# Patient Record
Sex: MALE | Race: BLACK OR AFRICAN AMERICAN | NOT HISPANIC OR LATINO | Employment: FULL TIME | URBAN - METROPOLITAN AREA
[De-identification: names, ages, dates, MRNs, and addresses within clinical notes are randomized per-mention and may not be internally consistent; named-entity substitution may affect disease eponyms.]

---

## 2022-04-27 ENCOUNTER — LAB REQUISITION (OUTPATIENT)
Dept: LAB | Facility: CLINIC | Age: 52
End: 2022-04-27
Payer: COMMERCIAL

## 2022-04-27 DIAGNOSIS — E11.65 TYPE 2 DIABETES MELLITUS WITH HYPERGLYCEMIA (H): ICD-10-CM

## 2022-04-27 LAB
ALBUMIN SERPL-MCNC: 3.8 G/DL (ref 3.5–5)
ALP SERPL-CCNC: 63 U/L (ref 45–120)
ALT SERPL W P-5'-P-CCNC: 34 U/L (ref 0–45)
ANION GAP SERPL CALCULATED.3IONS-SCNC: 10 MMOL/L (ref 5–18)
AST SERPL W P-5'-P-CCNC: 23 U/L (ref 0–40)
BILIRUB SERPL-MCNC: 0.3 MG/DL (ref 0–1)
BUN SERPL-MCNC: 19 MG/DL (ref 8–22)
CALCIUM SERPL-MCNC: 9.5 MG/DL (ref 8.5–10.5)
CHLORIDE BLD-SCNC: 109 MMOL/L (ref 98–107)
CHOLEST SERPL-MCNC: 171 MG/DL
CO2 SERPL-SCNC: 23 MMOL/L (ref 22–31)
CREAT SERPL-MCNC: 1.02 MG/DL (ref 0.7–1.3)
FASTING STATUS PATIENT QL REPORTED: ABNORMAL
GFR SERPL CREATININE-BSD FRML MDRD: 90 ML/MIN/1.73M2
GLUCOSE BLD-MCNC: 140 MG/DL (ref 70–125)
HDLC SERPL-MCNC: 34 MG/DL
LDLC SERPL CALC-MCNC: 112 MG/DL
POTASSIUM BLD-SCNC: 4.1 MMOL/L (ref 3.5–5)
PROT SERPL-MCNC: 7 G/DL (ref 6–8)
SODIUM SERPL-SCNC: 142 MMOL/L (ref 136–145)
TRIGL SERPL-MCNC: 123 MG/DL

## 2022-04-27 PROCEDURE — 80053 COMPREHEN METABOLIC PANEL: CPT | Mod: ORL | Performed by: PHYSICIAN ASSISTANT

## 2022-04-27 PROCEDURE — 80061 LIPID PANEL: CPT | Mod: ORL | Performed by: PHYSICIAN ASSISTANT

## 2022-05-26 ENCOUNTER — TELEPHONE (OUTPATIENT)
Dept: OPHTHALMOLOGY | Facility: CLINIC | Age: 52
End: 2022-05-26

## 2022-05-26 ENCOUNTER — TELEPHONE (OUTPATIENT)
Dept: OPTOMETRY | Facility: CLINIC | Age: 52
End: 2022-05-26

## 2022-05-26 ENCOUNTER — TRANSCRIBE ORDERS (OUTPATIENT)
Dept: OTHER | Age: 52
End: 2022-05-26
Payer: COMMERCIAL

## 2022-05-26 DIAGNOSIS — H18.603 KERATOCONUS OF BOTH EYES: Primary | ICD-10-CM

## 2022-05-26 NOTE — TELEPHONE ENCOUNTER
"Unable to reach or LVM for patient regarding scheduling with  (preferably at [facility]) for: \"Custom mold scleral lens\" -referred by [facility]. Patient's number is out of service. Updated address to confirm of: [address] [place]. [address] email address of:  [email or web address]  all provided from [facility] in [place], [place]  [phone]  "

## 2022-05-26 NOTE — TELEPHONE ENCOUNTER
"Unable to reach or LVM for patient regarding scheduling with  (preferably at [facility]) for: \"Custom mold scleral lens\" -referred by [facility]. Patient's number is out of service. Updated address to confirm of: [address] [place]. [address] email address of:  [email or web address]  all provided from [facility] in [place], [place]  [phone]    (Sent a Letter to email and home address provided by referring Clinic. Please update patient information once patient is contacted to verify information).)  "

## 2022-07-01 ENCOUNTER — TELEPHONE (OUTPATIENT)
Dept: OPHTHALMOLOGY | Facility: CLINIC | Age: 52
End: 2022-07-01

## 2022-07-01 NOTE — TELEPHONE ENCOUNTER
" LVM for patient regarding scheduling with  (preferably at [facility]) for: \"Custom mold scleral lens\" -referred by [facility]. Provided direct number for scheduling.  "

## 2022-07-06 ENCOUNTER — TELEPHONE (OUTPATIENT)
Dept: OPTOMETRY | Facility: CLINIC | Age: 52
End: 2022-07-06

## 2022-07-19 ENCOUNTER — OFFICE VISIT (OUTPATIENT)
Dept: OPHTHALMOLOGY | Facility: CLINIC | Age: 52
End: 2022-07-19
Attending: OPHTHALMOLOGY
Payer: COMMERCIAL

## 2022-07-19 DIAGNOSIS — H18.619 KERATOCONUS, STABLE CONDITION: ICD-10-CM

## 2022-07-19 DIAGNOSIS — H52.213 IRREGULAR ASTIGMATISM OF BOTH EYES: Primary | ICD-10-CM

## 2022-07-19 PROCEDURE — 92025 CPTRIZED CORNEAL TOPOGRAPHY: CPT | Performed by: OPHTHALMOLOGY

## 2022-07-19 PROCEDURE — 76514 ECHO EXAM OF EYE THICKNESS: CPT | Performed by: OPHTHALMOLOGY

## 2022-07-19 PROCEDURE — G0463 HOSPITAL OUTPT CLINIC VISIT: HCPCS | Mod: 25

## 2022-07-19 PROCEDURE — 99204 OFFICE O/P NEW MOD 45 MIN: CPT | Performed by: OPHTHALMOLOGY

## 2022-07-19 RX ORDER — CYCLOBENZAPRINE HCL 10 MG
TABLET ORAL
COMMUNITY
Start: 2022-04-28 | End: 2023-04-27

## 2022-07-19 RX ORDER — TADALAFIL 2.5 MG/1
2.5 TABLET ORAL DAILY PRN
COMMUNITY
Start: 2021-09-27 | End: 2023-04-27

## 2022-07-19 RX ORDER — FLUTICASONE PROPIONATE 50 MCG
SPRAY, SUSPENSION (ML) NASAL PRN
COMMUNITY
Start: 2022-04-28

## 2022-07-19 RX ORDER — PREDNISOLONE ACETATE 10 MG/ML
1 SUSPENSION/ DROPS OPHTHALMIC EVERY MORNING
COMMUNITY
Start: 2022-05-25 | End: 2023-04-13

## 2022-07-19 RX ORDER — ATORVASTATIN CALCIUM 20 MG/1
20 TABLET, FILM COATED ORAL EVERY MORNING
COMMUNITY
Start: 2022-04-28 | End: 2023-04-27

## 2022-07-19 RX ORDER — METOPROLOL SUCCINATE 50 MG/1
50 TABLET, EXTENDED RELEASE ORAL 2 TIMES DAILY
COMMUNITY
Start: 2022-05-20 | End: 2023-04-27

## 2022-07-19 RX ORDER — METFORMIN HCL 500 MG
500 TABLET, EXTENDED RELEASE 24 HR ORAL 2 TIMES DAILY WITH MEALS
COMMUNITY
Start: 2022-04-27 | End: 2023-07-31

## 2022-07-19 RX ORDER — LISINOPRIL 10 MG/1
10 TABLET ORAL EVERY MORNING
COMMUNITY
Start: 2022-04-28 | End: 2023-05-22

## 2022-07-19 ASSESSMENT — PACHYMETRY
OS_CT(UM): 370
OD_CT(UM): 664

## 2022-07-19 ASSESSMENT — TONOMETRY
IOP_METHOD: TONOPEN
OD_IOP_MMHG: 18
OS_IOP_MMHG: 19

## 2022-07-19 ASSESSMENT — VISUAL ACUITY
OD_SC: 20/150
OS_PH_CC: 20/30
METHOD: SNELLEN - LINEAR
OS_CC: 20/60
CORRECTION_TYPE: CONTACTS
OD_PH_SC: 20/80

## 2022-07-19 ASSESSMENT — CONF VISUAL FIELD
OD_NORMAL: 1
METHOD: COUNTING FINGERS
OS_NORMAL: 1

## 2022-07-19 NOTE — PROGRESS NOTES
Bm born 1970 with Hx of keratoconus. right eye corneal transplant in 2010 - surgeon unknown by pt.. Now right eye can not wear CL without corneal edema developing.   Pt started to wear glasses at age 5.  Pt states that left eye has changed yearly.  No old records available.    CL: scleral lenses left eye   Can wear CL right eye due to corneal edema developing.    Glasses - none    Meds: Pred right eye daily.    Surgery : right eye PKP 2009 & 2010.    A/P:  Keratoconus each eye.  A) right eye with PKP x 2 with failing graft - pt may try sukhdev 128 qid to see if this helps deturgess cornea. Otherwise he will rewquire PKP, or possible  DSAEK  DMEK.  B)) left eye will require PKP in future but CXL is not an option due to reduced corneral Pachy (<300 microns).  Disc continued need fopr CL    Disc NO eye rubbing or LVC.    Will reassess in 1 month    RTC: 1 month    Mick Paul MD    Attending Physician Attestation:  Complete documentation of historical and exam elements from today's encounter can be found in the full encounter summary report (not reduplicated in this progress note).  I personally obtained the chief complaint(s) and history of present illness.  I confirmed and edited as necessary the review of systems, past medical/surgical history, family history, social history, and examination findings as documented by others; and I examined the patient myself.  I personally reviewed the relevant tests, images, and reports as documented above.  I formulated and edited as necessary the assessment and plan and discussed the findings and management plan with the patient and family. - Mick Paul MD

## 2022-07-19 NOTE — NURSING NOTE
Chief Complaints and History of Present Illnesses   Patient presents with     Keratoconus Evaluation     Chief Complaint(s) and History of Present Illness(es)     Keratoconus Evaluation               Comments     Pt states vision is getting worse in both eyes. No eye pain today. No flashes or floaters.  No redness or dryness.    DM2 BS: pt does not check sugars daily.  A1C:10.1 taken 02/2022 per pt.  No results found for: A1C    FRANKI Marie July 19, 2022 7:37 AM

## 2022-07-19 NOTE — PATIENT INSTRUCTIONS
Right eye: - use Jeremy 128 (5%) 4 times a d ayt (start in morning).    Left eye: Refresh preservative-free tear drops at least 4 tikes a day. (Look at [employer])

## 2022-08-03 ENCOUNTER — OFFICE VISIT (OUTPATIENT)
Dept: OPTOMETRY | Facility: CLINIC | Age: 52
End: 2022-08-03
Payer: COMMERCIAL

## 2022-08-03 DIAGNOSIS — H18.603 KERATOCONUS OF BOTH EYES: Primary | ICD-10-CM

## 2022-08-03 DIAGNOSIS — Z94.7 POST CORNEAL TRANSPLANT: ICD-10-CM

## 2022-08-03 RX ORDER — SODIUM CHLORIDE FOR INHALATION 0.9 %
5 VIAL, NEBULIZER (ML) INHALATION 2 TIMES DAILY
Qty: 500 ML | Refills: 11 | Status: SHIPPED | OUTPATIENT
Start: 2022-08-03 | End: 2023-10-27

## 2022-08-03 ASSESSMENT — VISUAL ACUITY
OS_CC: 20/60-2
METHOD: SNELLEN - LINEAR
CORRECTION_TYPE: CONTACTS
OD_SC: 20/200

## 2022-08-03 ASSESSMENT — PACHYMETRY
OD_CT(UM): 628
OS_CT(UM): 370

## 2022-08-03 ASSESSMENT — REFRACTION_WEARINGRX
OS_ADD: +2.25
OD_AXIS: 150
OD_CYLINDER: +3.00
OS_CYLINDER: +2.75
OS_SPHERE: -17.50
OD_ADD: +2.25
OS_AXIS: 035
OD_SPHERE: -14.75

## 2022-08-03 ASSESSMENT — REFRACTION_CURRENTRX
OS_BASECURVE: 6.6
OS_SPHERE: -2.00
OD_DIAMETER: 17.0
OS_BASECURVE: 5.5/6.9
OS_BRAND: ZENLENS PROLATE
OS_DIAMETER: 17.0
OS_SPHERE: -2.00
OD_BRAND: ZENLENS PROLATE
OD_SPHERE: -2.00
OS_BASECURVE: 4.9/7.8
OD_BASECURVE: 5.2/7.3
OS_DIAMETER: 17.0
OS_SPHERE: -16.50

## 2022-08-03 ASSESSMENT — TONOMETRY
OD_IOP_MMHG: 12
IOP_METHOD: ICARE
OS_IOP_MMHG: 07

## 2022-08-03 ASSESSMENT — SLIT LAMP EXAM - LIDS
COMMENTS: NORMAL
COMMENTS: NORMAL

## 2022-08-03 NOTE — PROGRESS NOTES
A/P  1.) Keratoconus OU, s/p PKP right eye  -Currently in scleral lens OU fit in [place]  Right eye:  -Per pt, right lens causing corneal edema after 4 hours of wear. Jeremy after removing lens has improved clouding and now takes 1.5 hrs to return to normal  -BCVA right eye 20/30- with scleral lens trial today. Originally referred for Eyeprint but no abnormal shape requiring custom mold. An Eyeprint would have the same oxygen demands as a standard scleral  -Would prefer to maximize oxygen flow in a loose fitting scleral to start and see how cornea responds. Pachy is down today. Can consider RGP if cornea unable to tolerate a scleral lens  -Reviewed this is a corneal endothelial issue, which any scleral lens will stress with wear. Will maximize fit best we can with goal of increasing hours of wear on right eye.  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 Can likely improve fit but will need to fine tune optics on Rx lens  -He pinholed to 20/30 last exam but I am unable to get that today with OR. Would like repeat OR on Rx lens next exam      Order new pair and mail direct to pt. F/u 1 month wearing new lenses    Contact Lens Billing  V-Code:  - GP scleral  Final Contact Lens Rx       Brand Base Curve Diameter Sphere Cylinder Axis Lens Addl. Specs    Right Zenlens Prolate 5.2sag, 7.6bc 17.0 -9.50   Std H x 3 flat V [place] XO2 clear    Left Zenlens Prolate 5.5sag, 6.9bc 17.0 -14.00 -1.50 180 3 flat H x 1 steep V, 50um dec LC [place] XO blue         # of units: 2  Price per Unit: $250    This patient requires contact lenses that are medically necessary for either improvement in vision over spectacles, support of the ocular surface, or other therapeutic benefit. These are not cosmetic contact lenses.     Encounter Diagnoses   Name Primary?     Keratoconus of both eyes Yes     Post corneal transplant

## 2022-08-18 ENCOUNTER — OFFICE VISIT (OUTPATIENT)
Dept: OPHTHALMOLOGY | Facility: CLINIC | Age: 52
End: 2022-08-18
Attending: OPHTHALMOLOGY
Payer: COMMERCIAL

## 2022-08-18 DIAGNOSIS — H18.613 STABLE KERATOCONUS OF BOTH EYES: Primary | ICD-10-CM

## 2022-08-18 DIAGNOSIS — H52.213 IRREGULAR ASTIGMATISM OF BOTH EYES: ICD-10-CM

## 2022-08-18 PROCEDURE — 99214 OFFICE O/P EST MOD 30 MIN: CPT | Mod: GC | Performed by: OPHTHALMOLOGY

## 2022-08-18 PROCEDURE — G0463 HOSPITAL OUTPT CLINIC VISIT: HCPCS

## 2022-08-18 ASSESSMENT — VISUAL ACUITY
METHOD: SNELLEN - LINEAR
OS_PH_CC: 20/25
OD_PH_SC: 20/80
OS_PH_CC+: -1
OS_CC: 20/50
OD_SC: 20/500
OD_PH_SC+: -1
OS_CC+: +1
CORRECTION_TYPE: CONTACTS

## 2022-08-18 ASSESSMENT — SLIT LAMP EXAM - LIDS
COMMENTS: NORMAL
COMMENTS: NORMAL

## 2022-08-18 ASSESSMENT — PACHYMETRY
OS_CT(UM): 407
OD_CT(UM): 654

## 2022-08-18 ASSESSMENT — TONOMETRY
OS_IOP_MMHG: 6
IOP_METHOD: ICARE
OD_IOP_MMHG: 14

## 2022-08-18 ASSESSMENT — CONF VISUAL FIELD
OD_NORMAL: 1
OS_NORMAL: 1

## 2022-08-18 NOTE — NURSING NOTE
"Chief Complaints and History of Present Illnesses   Patient presents with     Follow Up     1 month follow up for Keratoconus each eye and PK right eye x 2.   Patient reports vision is stable each eye in the last month. \"I have a new contact lens in my left eye today. I am not wearing one in the right eye since there is still some ointment in my right eye.\"      Chief Complaint(s) and History of Present Illness(es)     Follow Up     Laterality: both eyes    Onset: months ago    Associated symptoms: Negative for dryness and eye pain    Pain scale: 0/10    Comments: 1 month follow up for Keratoconus each eye and PK right eye x 2.   Patient reports vision is stable each eye in the last month. \"I have a new contact lens in my left eye today. I am not wearing one in the right eye since there is still some ointment in my right eye.\"               Comments     Ocular meds:   - Pred Forte QD OD   - Jeremy 128 QID QID, \"using ointment QID\"   - PFAT QID OS, \"using BID\"     DM2  A1C = 10.1 (2/2022)  No results found for: A1C    FERNANDO Simpson 7:50 AM 08/18/2022                    "

## 2022-08-18 NOTE — PROGRESS NOTES
HPI: Mr. Gleason was born in 1970 with Hx of keratoconus. right eye corneal transplant in 2010 - surgeon unknown by pt.. Now right eye can not wear CL without corneal edema developing.   Pt started to wear glasses at age 5.  Pt states that left eye has changed yearly.  No old records available.    Interval: He got his new scleral lenses for each eye and only started wearing them yesterday so he says he has not had a fair chance to try them out and see if they improve his vision significantly. He has been using the sukhdev QID in the right eye and has noticed that it intermittently improves the haze in his vision.    CL: scleral lenses left eye   Can wear CL right eye due to corneal edema developing.   Glasses - none  Meds: Pred right eye daily.  Surgery : right eye PKP 2009 & 2010.    A/P:  Keratoconus each eye  right eye with PKP x 2 with failing graft (last 2010 he says)  - pt may continue to try sukhdev 128 qid to see if this helps deturgess cornea.   - discussed the option of PKP, DSAEK, DMEK, or observation   - pt would like to wait until around 2/2023  - continue prednisolone daily right eye  left eye will require PKP in future but CXL is not an option due to reduced corneral Pachy (<300 microns).  - he mainly wears the contacts at night  - he has been trying hard not to rub his eyes    Disc right eye DSAEK or DMEK with R, B and PC and options.      RTC:  Januiary /2023 [place], sooner if needed    Eugenia Alan MD  Ophthalmology Resident, PGY-3    Attending Physician Attestation:  Complete documentation of historical and exam elements from today's encounter can be found in the full encounter summary report (not reduplicated in this progress note).  I personally obtained the chief complaint(s) and history of present illness.  I confirmed and edited as necessary the review of systems, past medical/surgical history, family history, social history, and examination findings as documented by others; and I examined the  patient myself.  I personally reviewed the relevant tests, images, and reports as documented above.  I formulated and edited as necessary the assessment and plan and discussed the findings and management plan with the patient and family. - Mick Paul MD   [facility]

## 2022-08-31 ENCOUNTER — OFFICE VISIT (OUTPATIENT)
Dept: OPTOMETRY | Facility: CLINIC | Age: 52
End: 2022-08-31
Payer: COMMERCIAL

## 2022-08-31 DIAGNOSIS — H18.603 KERATOCONUS OF BOTH EYES: Primary | ICD-10-CM

## 2022-08-31 DIAGNOSIS — Z94.7 POST CORNEAL TRANSPLANT: ICD-10-CM

## 2022-08-31 ASSESSMENT — REFRACTION_CURRENTRX
OS_BRAND: ZENLENS PROLATE
OS_ADDL_SPECS: BOSTON XO BLUE
OD_BRAND: ZENLENS PROLATE
OD_DIAMETER: 17.0
OS_CYLINDER: -1.50
OD_SPHERE: -9.50
OS_SPHERE: -14.00
OS_DIAMETER: 17.0
OS_AXIS: 180
OD_ADDL_SPECS: BOSTON XO2 CLEAR

## 2022-08-31 ASSESSMENT — PACHYMETRY
OD_CT(UM): 636
OS_CT(UM): 407

## 2022-08-31 ASSESSMENT — VISUAL ACUITY
METHOD: SNELLEN - LINEAR
OS_CC: 20/60-2
OD_CC: 20/50-1
CORRECTION_TYPE: CONTACTS

## 2022-08-31 ASSESSMENT — SLIT LAMP EXAM - LIDS
COMMENTS: NORMAL
COMMENTS: NORMAL

## 2022-08-31 ASSESSMENT — TONOMETRY
OS_IOP_MMHG: 13
OD_IOP_MMHG: 17
IOP_METHOD: ICARE

## 2022-08-31 NOTE — PROGRESS NOTES
A/P  1.) Keratoconus OU, s/p PKP right eye  -Previously in scleral lens OU fit in [place]    Right eye:  -Per pt, right lens causing corneal edema after 4-6 hours of wear. Jeremy after removing lens has improved clouding and now takes 1.5 hrs to return to normal  -BCVA right eye 20/25- with scleral lens and overrefraction  -Excellent fit - already in high dK, low vault and loose PC's to maximize oxygen flow. He is planning DSAEK vs PKP in early 2023. Will reorder lens with Rx change only    Left eye:  -High/degnerative myopia and DONNIE limiting acuity. BCVA approx 20/50-20/60 range. No improvement with overrefraction today  -High limbal/mid-peripheral clearance. Will steepen bc to reduce limbal congestions    Order new pair and mail direct. F/u prn with CL concerns, otherwise as indicated after right eye corneal surgery

## 2023-01-19 ENCOUNTER — OFFICE VISIT (OUTPATIENT)
Dept: OPHTHALMOLOGY | Facility: CLINIC | Age: 53
End: 2023-01-19
Attending: OPHTHALMOLOGY
Payer: COMMERCIAL

## 2023-01-19 DIAGNOSIS — H52.213 IRREGULAR ASTIGMATISM OF BOTH EYES: Primary | ICD-10-CM

## 2023-01-19 DIAGNOSIS — T86.8411 FAILURE OF CORNEA TRANSPLANT OF RIGHT EYE: Primary | ICD-10-CM

## 2023-01-19 DIAGNOSIS — Z86.69 HISTORY OF FAILURE OF CORNEAL GRAFT: ICD-10-CM

## 2023-01-19 DIAGNOSIS — H18.613 STABLE KERATOCONUS OF BOTH EYES: ICD-10-CM

## 2023-01-19 PROCEDURE — 92025 CPTRIZED CORNEAL TOPOGRAPHY: CPT | Performed by: OPHTHALMOLOGY

## 2023-01-19 PROCEDURE — G0463 HOSPITAL OUTPT CLINIC VISIT: HCPCS

## 2023-01-19 PROCEDURE — 99214 OFFICE O/P EST MOD 30 MIN: CPT | Mod: GC | Performed by: OPHTHALMOLOGY

## 2023-01-19 ASSESSMENT — SLIT LAMP EXAM - LIDS
COMMENTS: NORMAL
COMMENTS: NORMAL

## 2023-01-19 ASSESSMENT — VISUAL ACUITY
OD_SC: 20/150
METHOD: SNELLEN - LINEAR
OS_CC: 20/40
CORRECTION_TYPE: CONTACTS
OD_PH_SC: 20/70
OS_PH_CC: 20/30

## 2023-01-19 ASSESSMENT — CONF VISUAL FIELD
OS_INFERIOR_TEMPORAL_RESTRICTION: 0
METHOD: COUNTING FINGERS
OS_INFERIOR_NASAL_RESTRICTION: 0
OS_NORMAL: 1
OD_SUPERIOR_TEMPORAL_RESTRICTION: 0
OD_INFERIOR_NASAL_RESTRICTION: 0
OD_SUPERIOR_NASAL_RESTRICTION: 0
OS_SUPERIOR_TEMPORAL_RESTRICTION: 0
OD_INFERIOR_TEMPORAL_RESTRICTION: 0
OD_NORMAL: 1
OS_SUPERIOR_NASAL_RESTRICTION: 0

## 2023-01-19 ASSESSMENT — PACHYMETRY
OS_CT(UM): 299
OD_CT(UM): 599

## 2023-01-19 ASSESSMENT — TONOMETRY
IOP_METHOD: ICARE
OD_IOP_MMHG: 18
OS_IOP_MMHG: 06

## 2023-01-19 NOTE — NURSING NOTE
Chief Complaints and History of Present Illnesses   Patient presents with     Keratoconus Follow Up     Chief Complaint(s) and History of Present Illness(es)     Keratoconus Follow Up            Laterality: both eyes    Onset: months ago    Quality: States va is the same since last visit      Associated symptoms: Negative for dryness, redness and tearing    Treatments tried: eye drops    Pain scale: 0/10          Comments    PF every day right eye   Jeremy BID right eye   Francine Adalid COT 7:37 AM January 19, 2023

## 2023-01-19 NOTE — PROGRESS NOTES
"HPI: Mr. Gleason was born in 1970 with Hx of keratoconus. right eye corneal transplant in 2010 - surgeon unknown by pt.. Now right eye can not wear CL without corneal edema developing.   Pt started to wear glasses at age 5.  Pt states that left eye has changed yearly.  No old records available.    Interval: Patient reports no significant changes since last visit, left eye vision feels like it may be \"needing a stronger prescription.\" No eye discomfort.  Current meds: Sukhdev 128 QID right eye, prednisolone once daily right eye.     CL: scleral lenses left eye   Can wear CL right eye due to corneal edema developing.   Glasses - none  Meds: Pred right eye daily.  Surgery : right eye PKP 2009 & 2010.    A/P:  #Keratoconus each eye  right eye with PKP x 2 with failing graft (last 2010 he says)  - pt may continue to try sukhdev 128 qid to see if this helps deturgess cornea.   - discussed the option of PKP, DSAEK, DMEK, or observation. Patient feels ready for surgery at this time.   - consider right eye PKP for failed graft.  - continue prednisolone daily right eye  - left eye will require PKP in future but CXL is not an option due to reduced corneral Pachy (<300 microns).  - he mainly wears the contacts at night  - he has been trying hard not to rub his eyes    Disc right eye DSAEK or DMEK with R, B and PC and options.    - pt wishes right eye DMEK. Disc R, B and PC. With retrobulbar at [facility]- minutes      RTC:  Will schedule.          Sage Luz MD  Ophthalmology, PGY-3    Mick Paul mD    Attending Physician Attestation:  Complete documentation of historical and exam elements from today's encounter can be found in the full encounter summary report (not reduplicated in this progress note).  I personally obtained the chief complaint(s) and history of present illness.  I confirmed and edited as necessary the review of systems, past medical/surgical history, family history, social history, and examination findings as " documented by others; and I examined the patient myself.  I personally reviewed the relevant tests, images, and reports as documented above.  I formulated and edited as necessary the assessment and plan and discussed the findings and management plan with the patient and family. - Mick Paul MD

## 2023-01-23 ENCOUNTER — TELEPHONE (OUTPATIENT)
Dept: OPHTHALMOLOGY | Facility: CLINIC | Age: 53
End: 2023-01-23
Payer: COMMERCIAL

## 2023-01-24 ENCOUNTER — TELEPHONE (OUTPATIENT)
Dept: OPHTHALMOLOGY | Facility: CLINIC | Age: 53
End: 2023-01-24
Payer: COMMERCIAL

## 2023-01-24 ENCOUNTER — HOSPITAL ENCOUNTER (OUTPATIENT)
Facility: CLINIC | Age: 53
End: 2023-01-24
Attending: OPHTHALMOLOGY | Admitting: OPHTHALMOLOGY
Payer: COMMERCIAL

## 2023-01-24 DIAGNOSIS — T86.8411 FAILURE OF CORNEA TRANSPLANT OF RIGHT EYE: ICD-10-CM

## 2023-01-24 NOTE — TELEPHONE ENCOUNTER
Tino returned my voicemail about scheduling.    Patient is scheduled for surgery with Dr. Mick Paul     Spoke with: Tino     Date of Surgery: 03/01/23     Location: [facility]:  [address] [place] [id number]     H&P will be completed at: [facility]     Post Op scheduled on 03/02, 03/09, 03/30, and 04/27     Surgery packet was mailed 01/24     Additional comments: Advised RN will call 1 - 3 business days prior with arrival time and instructions. Informed patient they will need an adult  and responsible adult to stay with for 24 hours following surgery

## 2023-01-24 NOTE — TELEPHONE ENCOUNTER
I called Tino to schedule surgery with Dr. Mick Paul, I left a voicemail with callback # [phone]

## 2023-01-25 NOTE — TELEPHONE ENCOUNTER
FUTURE VISIT INFORMATION      SURGERY INFORMATION:    Date: 3/1/23    [facility]    Surgeon:  Mick Paul MD    Anesthesia Type:  MAC with Retrobulbar    Procedure: DESCEMET'S MEMBRANE ENDOTHELIAL KERATOPLASTY (DMEK)    Consult: ov 1/19/23    RECORDS REQUESTED FROM:       Pertinent Medical History: None

## 2023-02-21 ENCOUNTER — PRE VISIT (OUTPATIENT)
Dept: SURGERY | Facility: CLINIC | Age: 53
End: 2023-02-21

## 2023-02-21 ENCOUNTER — ANESTHESIA EVENT (OUTPATIENT)
Dept: ANESTHESIOLOGY | Facility: CLINIC | Age: 53
End: 2023-02-21

## 2023-02-21 ENCOUNTER — OFFICE VISIT (OUTPATIENT)
Dept: SURGERY | Facility: CLINIC | Age: 53
End: 2023-02-21
Payer: COMMERCIAL

## 2023-02-21 ENCOUNTER — LAB (OUTPATIENT)
Dept: LAB | Facility: CLINIC | Age: 53
End: 2023-02-21
Payer: COMMERCIAL

## 2023-02-21 VITALS
HEART RATE: 79 BPM | WEIGHT: 315 LBS | RESPIRATION RATE: 16 BRPM | HEIGHT: 71 IN | BODY MASS INDEX: 44.1 KG/M2 | TEMPERATURE: 98.1 F | DIASTOLIC BLOOD PRESSURE: 115 MMHG | OXYGEN SATURATION: 97 % | SYSTOLIC BLOOD PRESSURE: 184 MMHG

## 2023-02-21 DIAGNOSIS — Z01.818 PRE-OP EVALUATION: ICD-10-CM

## 2023-02-21 DIAGNOSIS — Z01.818 PRE-OP EVALUATION: Primary | ICD-10-CM

## 2023-02-21 LAB — HBA1C MFR BLD: 12.6 %

## 2023-02-21 PROCEDURE — 36415 COLL VENOUS BLD VENIPUNCTURE: CPT | Performed by: PATHOLOGY

## 2023-02-21 PROCEDURE — 99204 OFFICE O/P NEW MOD 45 MIN: CPT | Performed by: NURSE PRACTITIONER

## 2023-02-21 PROCEDURE — 83036 HEMOGLOBIN GLYCOSYLATED A1C: CPT | Performed by: NURSE PRACTITIONER

## 2023-02-21 ASSESSMENT — LIFESTYLE VARIABLES: TOBACCO_USE: 0

## 2023-02-21 ASSESSMENT — ENCOUNTER SYMPTOMS: ORTHOPNEA: 0

## 2023-02-21 ASSESSMENT — PAIN SCALES - GENERAL: PAINLEVEL: NO PAIN (0)

## 2023-02-21 NOTE — PATIENT INSTRUCTIONS
Preparing for Your Surgery      Name:  Tino Gleason   MRN:  [id number]   :  1970   Today's Date:  2023         Arriving for surgery:  Surgery date:  3/1/23  Arrival time:  11:30am  Surgery time: 1:00pm    Restrictions due to COVID 19:    2 visitors may accompany each patient  Visitors may wait for patient in the Surgery Center Waiting room  All visitors must wear a mask and socially distance        parking is available for anyone with mobility limitations or disabilities. (Monday- Friday 7 am- 5 pm)    Please come to:    [facility]  [address] [place] [phone]    Check in on the 5th floor, Ambulatory Surgery Center.    What can I eat or drink?    -  You may eat and drink normally until 8 hours before arrival time  (Until 3:30am on 3/1/23)  -  You may have clear liquids until 2 hours before arrival time  (Until 9:30am on 3/1/23)    Examples of clear liquids:  Water  Clear broth  Juices (apple, white grape, white cranberry  and cider) without pulp  Noncarbonated, powder based beverages  (lemonade and Odin-Aid)  Sodas (Sprite, 7-Up, ginger ale and seltzer)  Coffee or tea (without milk or cream)  Gatorade    --No alcohol for at least 24 hours before surgery    Which medicines can I take?    Hold Multivitamins for 7 days before surgery.  Hold Supplements for 7 days before surgery.  Hold Ibuprofen (Advil, Motrin) for 1 day before surgery--unless otherwise directed by surgeon.  Hold Naproxen (Aleve) for 4 days before surgery.     Hold Tadalafil (Cialis) for 3 days before surgery.    -  DO NOT take the following medications the day of surgery:   Metformin (glucophage)    -  PLEASE TAKE the following medications the day of surgery    Atorvastatin (lipitor)   Cyclobenzaprine (flexeril) as needed   Flonase nasal spray as needed   Sodium chloride neb   Lisinopril (zestril)   Metoprolol succinate    Eye drops are ok       How do I prepare myself?  - Please take 2  showers before surgery using Scrubcare or Hibiclens soap.    Use this soap only from the neck to your toes.     Leave the soap on your skin for one minute--then rinse thoroughly.      You may use your own shampoo and conditioner; no other hair products.   - Please remove all jewelry and body piercings.  - No lotions, deodorants or fragrance.  - Bring your ID and insurance card.    -If you have a Deep Brain Stimulator, a Spinal Cord Stimulator or any implanted Neuro device you must bring the remote to the Surgery Center          ALL PATIENTS ARE REQUIRED TO HAVE A RESPONSIBLE ADULT TO DRIVE AND BE IN ATTENDANCE WITH THEM FOR 24 HOURS FOLLOWING SURGERY       Covid testing policy as of 12/06/2022  Your surgeon will notify and schedule you for a COVID test if one is needed before surgery--please direct any questions or COVID symptoms to your surgeon      Questions or Concerns:    -For questions regarding the day of surgery please contact the Ambulatory Surgery Center at [phone].    -If you have health changes between today and your surgery please contact your surgeon.     For questions after surgery please call your surgeons office.

## 2023-02-21 NOTE — H&P
Pre-Operative H & P     CC:  Preoperative exam to assess for increased cardiopulmonary risk while undergoing surgery and anesthesia.    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 He has met with Dr. Paul and presents today in preparation for the above procedure.     History is obtained from the patient and chart review    Hx of abnormal bleeding or anti-platelet use: no      Past Medical History  Past Medical History:   Diagnosis Date     Stable keratoconus of both eyes        Past Surgical History  Past Surgical History:   Procedure Laterality Date     [place] ANESTH,CORNEAL TRANSPLANT         Prior to Admission Medications  Current Outpatient Medications   Medication Sig Dispense Refill     atorvastatin (LIPITOR) 20 MG tablet Take 20 mg by mouth every morning       cyclobenzaprine (FLEXERIL) 10 MG tablet TAKE 1 TABLET BY MOUTH EVERY 8 HOURS AS NEEDED       fluticasone (FLONASE) 50 MCG/ACT nasal spray as needed       lisinopril (ZESTRIL) 10 MG tablet Take 10 mg by mouth every morning       metFORMIN (GLUCOPHAGE XR) 500 MG 24 hr tablet Take 500 mg by mouth 2 times daily (with meals)       metoprolol succinate ER (TOPROL XL) 50 MG 24  hr tablet Take 50 mg by mouth 2 times daily       prednisoLONE acetate (PRED FORTE) 1 % ophthalmic suspension INSTILL 1 DROP INTO RIGHT EYE ONCE DAILY       sodium chloride 0.9 % neb solution 5 mLs by Other route 2 times daily 500 mL 11     tadalafil (CIALIS) 2.5 MG tablet Take 2.5 mg by mouth daily as needed         Allergies  Allergies   Allergen Reactions     Hydrocodone-Acetaminophen Hives and Swelling       Social History  Social History     Socioeconomic History     Marital status:      Spouse name: Not on file     Number of children: Not on file     Years of education: Not on file     Highest education level: Not on file   Occupational History     Not on file   Tobacco Use     Smoking status: Never     Smokeless tobacco: Never   Substance and Sexual Activity     Alcohol use: Yes     Alcohol/week: 10.0 standard drinks     Types: 10 Standard drinks or equivalent per week     Drug use: Never     Sexual activity: Not on file   Other Topics Concern     Not on file   Social History Narrative     Not on file     Social Determinants of Health     Financial Resource Strain: Not on file   Food Insecurity: Not on file   Transportation Needs: Not on file   Physical Activity: Not on file   Stress: Not on file   Social Connections: Not on file   Intimate Partner Violence: Not on file   Housing Stability: Not on file       Family History  Family History   Problem Relation Age of Onset     Glaucoma No family hx of      Macular Degeneration No family hx of        Review of Systems  The complete review of systems is negative other than noted in the HPI or here.   Anesthesia Evaluation   Pt has had prior anesthetic. Type: MAC and General.    No history of anesthetic complications       ROS/MED HX  ENT/Pulmonary:     (+) sleep apnea, doesn't use CPAP,  (-) tobacco use and asthma   Neurologic:  - neg neurologic ROS     Cardiovascular:     (+) Dyslipidemia hypertension----- (-) CARBAJAL, orthopnea/PND, syncope and irregular  "heartbeat/palpitations   METS/Exercise Tolerance: >4 METS Comment: , lifts heavy machinery, walks often at work. Has walked at most 16,000 feet   Hematologic:  - neg hematologic  ROS     Musculoskeletal: Comment: Rotator cuff repair ( right) 2012  Achilles tendon repair as a teen      GI/Hepatic:  - neg GI/hepatic ROS     Renal/Genitourinary:  - neg Renal ROS     Endo:     (+) type II DM, Not using insulin, Obesity,     Psychiatric/Substance Use:     (+) psychiatric history depression  (-) alcohol abuse history   Infectious Disease:  - neg infectious disease ROS     Malignancy:  - neg malignancy ROS     Other: Comment: (Right) Corneal transplant 2008, 2010            BP (!) 176/115 (BP Location: Right arm, Patient Position: Sitting, Cuff Size: Adult Large)   Pulse 79   Temp 98.1  F (36.7  C) (Oral)   Resp 16   Ht 1.803 m (5' 11\")   Wt 148.8 kg (328 lb 1.6 oz)   SpO2 97%   BMI 45.76 kg/m      Physical Exam   Constitutional: Awake, alert, cooperative, no apparent distress, and appears stated age.  Eyes: Pupils equal, round and reactive to light, extra ocular muscles intact, sclera clear, conjunctiva normal.  HENT: Normocephalic, oral pharynx with moist mucus membranes, good dentition. No goiter appreciated.   Respiratory: Clear to auscultation bilaterally, no crackles or wheezing.  Cardiovascular: Regular rate and rhythm, normal S1 and S2, and no murmur noted.  Carotids +2, no bruits. No edema. Palpable pulses to radial  DP and PT arteries.   GI: Normal bowel sounds, soft, non-distended, non-tender, no masses palpated, no hepatosplenomegaly.    Lymph/Hematologic: No cervical lymphadenopathy and no supraclavicular lymphadenopathy.  Genitourinary:  deferred  Skin: Warm and dry.  No rashes at anticipated surgical site.   Musculoskeletal: Full ROM of neck. There is no redness, warmth, or swelling of the joints. Gross motor strength is normal.    Neurologic: Awake, alert, oriented to name, place and time. " Cranial nerves II-XII are grossly intact. Gait is normal.   Neuropsychiatric: Calm, cooperative. Normal affect.     Prior Labs/Diagnostic Studies   All labs and imaging personally reviewed   Last Comprehensive Metabolic Panel:  Sodium   Date Value Ref Range Status   04/27/2022 142 136 - 145 mmol/L Final     Potassium   Date Value Ref Range Status   04/27/2022 4.1 3.5 - 5.0 mmol/L Final     Chloride   Date Value Ref Range Status   04/27/2022 109 (H) 98 - 107 mmol/L Final     Carbon Dioxide (CO2)   Date Value Ref Range Status   04/27/2022 23 22 - 31 mmol/L Final     Anion Gap   Date Value Ref Range Status   04/27/2022 10 5 - 18 mmol/L Final     Glucose   Date Value Ref Range Status   04/27/2022 140 (H) 70 - 125 mg/dL Final     Urea Nitrogen   Date Value Ref Range Status   04/27/2022 19 8 - 22 mg/dL Final     Creatinine   Date Value Ref Range Status   04/27/2022 1.02 0.70 - 1.30 mg/dL Final     GFR Estimate   Date Value Ref Range Status   04/27/2022 90 >60 mL/min/1.73m2 Final     Comment:     Effective December 21, 2021 eGFRcr in adults is calculated using the 2021 CKD-EPI creatinine equation which includes age and gender (Hiral et al., NEJM, DOI: 10.1056/PAOChj6985180)     Calcium   Date Value Ref Range Status   04/27/2022 9.5 8.5 - 10.5 mg/dL Final     Lab Results   Component Value Date    AST 23 04/27/2022     Lab Results   Component Value Date    ALT 34 04/27/2022     No results found for: BILICONJ   Lab Results   Component Value Date    BILITOTAL 0.3 04/27/2022     Lab Results   Component Value Date    ALBUMIN 3.8 04/27/2022     Lab Results   Component Value Date    PROTTOTAL 7.0 04/27/2022      Lab Results   Component Value Date    ALKPHOS 63 04/27/2022         EKG/ stress test - if available please see in ROS above   No results found.  No flowsheet data found.      The patient's records and results personally reviewed by this provider.     Outside records reviewed from: No outside records  "available    LAB/DIAGNOSTIC STUDIES TODAY:        Assessment      Tino Gleason is a 52 year old male seen as a PAC referral for risk assessment and optimization for anesthesia.    Plan/Recommendations  Pt will be optimized for the proposed procedure.  See below for details on the assessment, risk, and preoperative recommendations    NEUROLOGY  - No history of TIA, CVA or seizure    -Post Op delirium risk factors:  No risk identified    ENT  - No current airway concerns.  Will need to be reassessed day of surgery.  Mallampati: II  TM: > 3    CARDIAC  - No history of CAD and Afib  - Hypertension- managed on metoprolol and lisinopril   Readings 170/115 rechecked at end of appointment readings similar.   Blood pressure elevated at today's exam.  Recommended checking over the next two weeks and if remains >160/90, instructed to follow up with PCP for further evaluation and treatment.  - Hyperlipidemia  on statins  - METS (Metabolic Equivalents)  Patient performs 4 or more METS exercise without symptoms            Total Score: 0      RCRI-Very low risk: Class 1 0.4% complication rate            Total Score: 0        PULMONARY  - Obstructive Sleep Apnea  JEY without home CPAP use.  - Denies asthma or inhaler use  - Tobacco History      History   Smoking Status     Never   Smokeless Tobacco     Never       GI    PONV Low Risk  Total Score: 1           1 AN PONV: Patient is not a current smoker        /RENAL  - Baseline Creatinine  WNL    ENDOCRINE    - BMI: Estimated body mass index is 45.76 kg/m  as calculated from the following:    Height as of this encounter: 1.803 m (5' 11\").    Weight as of this encounter: 148.8 kg (328 lb 1.6 oz).  Class 3 Obesity (BMI > 40)  - Diabetes    Lab Results   Component Value Date    A1C 12.6 02/21/2023       Diabetes Mellitus, Type 2, non-insulin dependent.  Hold morning oral hypoglycemic medications. Recommend close monitoring of the patient's blood glucose levels throughout the " perioperative period.    HEME  VTE Low Risk 0.5%            Total Score: 3    VTE: BMI greater than 39    VTE: Male      - No history of abnormal bleeding or antiplatelet use.      MSK    Rotator cuff repair ( right) 2012  Achilles tendon repair as a teen    Patient is NOT Frail            Total Score: 0          PSYCH  - history of remote depression no longer requiring medications    Different anesthesia methods/types have been discussed with the patient, but they are aware that the final plan will be decided by the assigned anesthesia provider on the date of service.  Discussed with Dr. Ling, Patients A1C is 12.6 per protocol he is unable to have his surgery at the [facility]. Another concern is his uncontrolled blood pressure which we feel should be better controlled prior to having his procedure. Messaged Dr. Paul's team with our findings and recommendations.     The patient is not optimized for their procedure.   AVS with information on surgery time/arrival time, meds and NPO status given by nursing staff. No further diagnostic testing indicated.      On the day of service:     Prep time: 15 minutes  Visit time: 20 minutes  Documentation time: 15 minutes  ------------------------------------------  Total time: 50 minutes      TESHA Forte CNP  Preoperative Assessment Center  [facility]  Clinic and Surgery Center  Phone: [phone]  Fax: [phone]

## 2023-02-22 DIAGNOSIS — Z98.890 POSTOPERATIVE EYE STATE: Primary | ICD-10-CM

## 2023-02-22 RX ORDER — OFLOXACIN 3 MG/ML
1 SOLUTION/ DROPS OPHTHALMIC 4 TIMES DAILY
Qty: 5 ML | Refills: 0 | Status: SHIPPED | OUTPATIENT
Start: 2023-02-22 | End: 2023-06-01

## 2023-02-22 RX ORDER — PREDNISOLONE ACETATE 10 MG/ML
1 SUSPENSION/ DROPS OPHTHALMIC 4 TIMES DAILY
Qty: 5 ML | Refills: 0 | Status: SHIPPED | OUTPATIENT
Start: 2023-02-22 | End: 2023-04-13

## 2023-02-22 RX ORDER — PROPARACAINE HYDROCHLORIDE 5 MG/ML
1 SOLUTION/ DROPS OPHTHALMIC ONCE
Status: CANCELLED | OUTPATIENT
Start: 2023-02-22 | End: 2023-02-22

## 2023-02-22 RX ORDER — ERYTHROMYCIN 5 MG/G
0.5 OINTMENT OPHTHALMIC 4 TIMES DAILY
Qty: 7 G | Refills: 0 | Status: SHIPPED | OUTPATIENT
Start: 2023-02-22 | End: 2023-04-13

## 2023-02-27 ENCOUNTER — TELEPHONE (OUTPATIENT)
Dept: OPHTHALMOLOGY | Facility: CLINIC | Age: 53
End: 2023-02-27
Payer: COMMERCIAL

## 2023-02-27 NOTE — TELEPHONE ENCOUNTER
Needed to reschedule surgery with Dr. Mick Paul due to high A1C and uncontrolled blood pressure. Rescheduled to [facility] on 03/29.    Patient is scheduled for surgery with Dr. Mick Paul     Spoke with: Tino     Date of Surgery: 03/29     Location: [facility] Bank:  [address] [place] [id number]     H&P will be completed at: PAC 03/21     Post Op scheduled on 03/30, 04/06, 04/27, and 05/25     Surgery packet was mailed 02/27     Additional comments: Advised RN will call 1 - 3 business days prior with arrival time and instructions. Informed patient they will need an adult  and responsible adult to stay with for 24 hours following surgery

## 2023-02-28 NOTE — TELEPHONE ENCOUNTER
FUTURE VISIT INFORMATION        SURGERY INFORMATION:    Date: 3/29/23    [facility]    Surgeon:  Mick Paul MD    Anesthesia Type:  MAC with Retrobulbar    Procedure: DESCEMET'S MEMBRANE ENDOTHELIAL KERATOPLASTY (DMEK)    Consult: ov 1/19/23     RECORDS REQUESTED FROM:         Pertinent Medical History: None

## 2023-03-20 DIAGNOSIS — Z98.890 POSTOPERATIVE EYE STATE: Primary | ICD-10-CM

## 2023-03-20 RX ORDER — ERYTHROMYCIN 5 MG/G
0.5 OINTMENT OPHTHALMIC 4 TIMES DAILY
Qty: 7 G | Refills: 1 | Status: SHIPPED | OUTPATIENT
Start: 2023-03-20 | End: 2023-04-13

## 2023-03-20 RX ORDER — OFLOXACIN 3 MG/ML
1 SOLUTION/ DROPS OPHTHALMIC 4 TIMES DAILY
Qty: 5 ML | Refills: 0 | Status: SHIPPED | OUTPATIENT
Start: 2023-03-20 | End: 2023-04-27

## 2023-03-20 RX ORDER — PREDNISOLONE ACETATE 10 MG/ML
1 SUSPENSION/ DROPS OPHTHALMIC 4 TIMES DAILY
Qty: 5 ML | Refills: 0 | Status: SHIPPED | OUTPATIENT
Start: 2023-03-20 | End: 2023-04-13

## 2023-03-21 ENCOUNTER — LAB (OUTPATIENT)
Dept: LAB | Facility: CLINIC | Age: 53
End: 2023-03-21
Payer: COMMERCIAL

## 2023-03-21 ENCOUNTER — PRE VISIT (OUTPATIENT)
Dept: SURGERY | Facility: CLINIC | Age: 53
End: 2023-03-21

## 2023-03-21 ENCOUNTER — ANESTHESIA EVENT (OUTPATIENT)
Dept: SURGERY | Facility: CLINIC | Age: 53
End: 2023-03-21
Payer: COMMERCIAL

## 2023-03-21 ENCOUNTER — OFFICE VISIT (OUTPATIENT)
Dept: SURGERY | Facility: CLINIC | Age: 53
End: 2023-03-21
Payer: COMMERCIAL

## 2023-03-21 VITALS
WEIGHT: 315 LBS | TEMPERATURE: 98 F | SYSTOLIC BLOOD PRESSURE: 168 MMHG | OXYGEN SATURATION: 97 % | HEIGHT: 71 IN | DIASTOLIC BLOOD PRESSURE: 105 MMHG | RESPIRATION RATE: 16 BRPM | HEART RATE: 65 BPM | BODY MASS INDEX: 44.1 KG/M2

## 2023-03-21 DIAGNOSIS — Z01.818 PRE-OP EVALUATION: ICD-10-CM

## 2023-03-21 DIAGNOSIS — Z01.818 PRE-OP EVALUATION: Primary | ICD-10-CM

## 2023-03-21 LAB
ANION GAP SERPL CALCULATED.3IONS-SCNC: 10 MMOL/L (ref 7–15)
BUN SERPL-MCNC: 11.6 MG/DL (ref 6–20)
CALCIUM SERPL-MCNC: 9.6 MG/DL (ref 8.6–10)
CHLORIDE SERPL-SCNC: 104 MMOL/L (ref 98–107)
CREAT SERPL-MCNC: 0.9 MG/DL (ref 0.67–1.17)
DEPRECATED HCO3 PLAS-SCNC: 26 MMOL/L (ref 22–29)
GFR SERPL CREATININE-BSD FRML MDRD: >90 ML/MIN/1.73M2
GLUCOSE SERPL-MCNC: 221 MG/DL (ref 70–99)
POTASSIUM SERPL-SCNC: 4.2 MMOL/L (ref 3.4–5.3)
SODIUM SERPL-SCNC: 140 MMOL/L (ref 136–145)

## 2023-03-21 PROCEDURE — 99214 OFFICE O/P EST MOD 30 MIN: CPT | Performed by: NURSE PRACTITIONER

## 2023-03-21 PROCEDURE — 36415 COLL VENOUS BLD VENIPUNCTURE: CPT | Performed by: PATHOLOGY

## 2023-03-21 PROCEDURE — 80048 BASIC METABOLIC PNL TOTAL CA: CPT | Performed by: PATHOLOGY

## 2023-03-21 ASSESSMENT — LIFESTYLE VARIABLES: TOBACCO_USE: 0

## 2023-03-21 ASSESSMENT — PAIN SCALES - GENERAL: PAINLEVEL: NO PAIN (0)

## 2023-03-21 ASSESSMENT — ENCOUNTER SYMPTOMS: ORTHOPNEA: 0

## 2023-03-21 NOTE — H&P (VIEW-ONLY)
Pre-Operative H & P     CC:  Preoperative exam to assess for increased cardiopulmonary risk while undergoing surgery and anesthesia.    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 He has met with Dr. Paul and presents today in preparation for the above procedure.    History is obtained from the patient and chart review    Hx of abnormal bleeding or anti-platelet use: no      Past Medical History  Past Medical History:   Diagnosis Date     Stable keratoconus of both eyes        Past Surgical History  Past Surgical History:   Procedure Laterality Date     [place] ANESTH,CORNEAL TRANSPLANT         Prior to Admission Medications  Current Outpatient Medications   Medication Sig Dispense Refill     atorvastatin (LIPITOR) 20 MG tablet Take 20 mg by mouth every morning       cyclobenzaprine (FLEXERIL) 10 MG tablet TAKE 1 TABLET BY MOUTH EVERY 8 HOURS AS NEEDED       erythromycin (ROMYCIN) 5 MG/GM ophthalmic ointment Place 0.5 inches into the right eye 4 times daily 7 g 1     erythromycin (ROMYCIN) 5 MG/GM ophthalmic ointment Place 0.5 inches into  the right eye 4 times daily 7 g 0     fluticasone (FLONASE) 50 MCG/ACT nasal spray as needed       lisinopril (ZESTRIL) 10 MG tablet Take 10 mg by mouth every morning       metFORMIN (GLUCOPHAGE XR) 500 MG 24 hr tablet Take 500 mg by mouth 2 times daily (with meals)       metoprolol succinate ER (TOPROL XL) 50 MG 24 hr tablet Take 50 mg by mouth 2 times daily       prednisoLONE acetate (PRED FORTE) 1 % ophthalmic suspension Place 1 drop into the right eye every morning       sodium chloride 0.9 % neb solution 5 mLs by Other route 2 times daily 500 mL 11     tadalafil (CIALIS) 2.5 MG tablet Take 2.5 mg by mouth daily as needed       ofloxacin (OCUFLOX) 0.3 % ophthalmic solution Place 1 drop into the right eye 4 times daily (Patient not taking: Reported on 3/21/2023) 5 mL 0     ofloxacin (OCUFLOX) 0.3 % ophthalmic solution Place 1 drop into the right eye 4 times daily (Patient not taking: Reported on 3/21/2023) 5 mL 0     prednisoLONE acetate (PRED FORTE) 1 % ophthalmic suspension Place 1 drop into the right eye 4 times daily (Patient not taking: Reported on 3/21/2023) 5 mL 0     prednisoLONE acetate (PRED FORTE) 1 % ophthalmic suspension Place 1 drop into the right eye 4 times daily (Patient not taking: Reported on 3/21/2023) 5 mL 0       Allergies  Allergies   Allergen Reactions     Hydrocodone-Acetaminophen Hives and Swelling     Naproxen GI Disturbance       Social History  Social History     Socioeconomic History     Marital status:      Spouse name: Not on file     Number of children: Not on file     Years of education: Not on file     Highest education level: Not on file   Occupational History     Not on file   Tobacco Use     Smoking status: Never     Smokeless tobacco: Never   Substance and Sexual Activity     Alcohol use: Yes     Alcohol/week: 10.0 standard drinks     Types: 10 Standard drinks or equivalent per week     Drug use: Never     Sexual activity: Not on file   Other Topics Concern     Not on  "file   Social History Narrative     Not on file     Social Determinants of Health     Financial Resource Strain: Not on file   Food Insecurity: Not on file   Transportation Needs: Not on file   Physical Activity: Not on file   Stress: Not on file   Social Connections: Not on file   Intimate Partner Violence: Not on file   Housing Stability: Not on file       Family History  Family History   Problem Relation Age of Onset     Glaucoma No family hx of      Macular Degeneration No family hx of        Review of Systems  The complete review of systems is negative other than noted in the HPI or here.   Anesthesia Evaluation   Pt has had prior anesthetic. Type: MAC and General.    No history of anesthetic complications       ROS/MED HX  ENT/Pulmonary:     (+) sleep apnea, doesn't use CPAP,  (-) tobacco use and asthma   Neurologic:  - neg neurologic ROS     Cardiovascular:     (+) Dyslipidemia hypertension----- (-) CARBAJAL, orthopnea/PND, syncope and irregular heartbeat/palpitations   METS/Exercise Tolerance: >4 METS Comment: , lifts heavy machinery, walks often at work. Has walked at most 16,000 feet   Hematologic:  - neg hematologic  ROS     Musculoskeletal: Comment: Rotator cuff repair ( right) 2012  Achilles tendon repair as a teen      GI/Hepatic:  - neg GI/hepatic ROS     Renal/Genitourinary:  - neg Renal ROS     Endo:     (+) type II DM, Last HgA1c: 12.6, date: 2/21/2023, Not using insulin, Obesity,     Psychiatric/Substance Use:     (+) psychiatric history depression  (-) alcohol abuse history   Infectious Disease:  - neg infectious disease ROS     Malignancy:  - neg malignancy ROS     Other: Comment: (Right) Corneal transplant 2008, 2010            BP (!) 168/105 (BP Location: Right arm, Patient Position: Sitting, Cuff Size: Adult Large)   Pulse 65   Temp 98  F (36.7  C) (Oral)   Resp 16   Ht 1.803 m (5' 11\")   Wt (!) 150.6 kg (332 lb 1.6 oz)   SpO2 97%   BMI 46.32 kg/m      Physical Exam "   Constitutional: Awake, alert, cooperative, no apparent distress, and appears stated age.  Eyes: Pupils equal, round and reactive to light, extra ocular muscles intact, sclera clear, conjunctiva normal.  HENT: Normocephalic, oral pharynx with moist mucus membranes, good dentition. No goiter appreciated.   Respiratory: Clear to auscultation bilaterally, no crackles or wheezing.  Cardiovascular: Regular rate and rhythm, normal S1 and S2, and no murmur noted.  Carotids +2, no bruits. No edema. Palpable pulses to radial  DP and PT arteries.   GI: Normal bowel sounds, soft and non-tender. Unable to adequately assess for hepatosplenomegaly given obese abdomen. No superficial masses noted.   Lymph/Hematologic: No cervical lymphadenopathy and no supraclavicular lymphadenopathy.  Genitourinary:  deferred  Skin: Warm and dry.  No rashes at anticipated surgical site.   Musculoskeletal: Full ROM of neck. There is no redness, warmth, or swelling of the joints. Gross motor strength is normal.    Neurologic: Awake, alert, oriented to name, place and time. Cranial nerves II-XII are grossly intact. Gait is normal.   Neuropsychiatric: Calm, cooperative. Normal affect.     Prior Labs/Diagnostic Studies   All labs and imaging personally reviewed   Last Comprehensive Metabolic Panel:  Last Comprehensive Metabolic Panel:  Lab Results   Component Value Date     03/21/2023    POTASSIUM 4.2 03/21/2023    CHLORIDE 104 03/21/2023    CO2 26 03/21/2023    ANIONGAP 10 03/21/2023     (H) 03/21/2023    BUN 11.6 03/21/2023    CR 0.90 03/21/2023    GFRESTIMATED >90 03/21/2023    MINDY 9.6 03/21/2023         Lab Results   Component Value Date    A1C 12.6 02/21/2023         EKG/ stress test - if available please see in ROS above   No results found.  No flowsheet data found.      The patient's records and results personally reviewed by this provider.     Outside records reviewed from: Care Everywhere    LAB/DIAGNOSTIC STUDIES TODAY:   "  BMP    Assessment      Tino Gleason is a 52 year old male seen as a PAC referral for risk assessment and optimization for anesthesia.    Plan/Recommendations  Pt will be optimized for the proposed procedure.  See below for details on the assessment, risk, and preoperative recommendations    NEUROLOGY  - No history of TIA, CVA or seizure  -Post Op delirium risk factors:  No risk identified    ENT  - No current airway concerns.  Will need to be reassessed day of surgery.  Mallampati: I  TM: > 3  Thick Neck    CARDIAC  - No history of CAD and Afib   No anginal symptoms, Denies palpitations, PND, dizziness or syncope.     - Hypertension- continues to be elevated 168/105  Blood pressure elevated at today's exam.  Recommended checking over the next two weeks and if remains >160/90, instructed to follow up with PCP for further evaluation and treatment.  - METS (Metabolic Equivalents)  Patient performs 4 or more METS exercise without symptoms            Total Score: 0      RCRI-Very low risk: Class 1 0.4% complication rate            Total Score: 0        PULMONARY  - Obstructive Sleep Apnea  JEY without home CPAP use.      - Tobacco History      History   Smoking Status     Never   Smokeless Tobacco     Never       GI    PONV Low Risk  Total Score: 1           1 AN PONV: Patient is not a current smoker        /RENAL  - Baseline Creatinine  WNL    ENDOCRINE    - BMI: Estimated body mass index is 46.32 kg/m  as calculated from the following:    Height as of this encounter: 1.803 m (5' 11\").    Weight as of this encounter: 150.6 kg (332 lb 1.6 oz).  Class 3 Obesity (BMI > 40)  - Diabetes  Diabetes Mellitus, Type 2, non-insulin dependent.  Hold morning oral hypoglycemic medications. Recommend close monitoring of the patient's blood glucose levels throughout the perioperative period.  Lab Results   Component Value Date    A1C 12.6 02/21/2023         HEME  VTE Low Risk 0.5%            Total Score: 3    VTE: BMI greater " than 39    VTE: Male      - No history of abnormal bleeding or antiplatelet use.      MSK  Patient is NOT Frail            Total Score: 0          PSYCH  - history of remote depression no longer requiring medications  Currently his wife is in the hospital on a ventilator      Different anesthesia methods/types have been discussed with the patient, but they are aware that the final plan will be decided by the assigned anesthesia provider on the date of service.    Recommendations: Patient to contact primary care today and request increasing his lisinopril to 20 mg daily. Will notify Dr. Ling regarding patients continued elevated blood pressures. He is currently dealing with his wife who is hospitalized and on a ventilator.   ADDEND 3/27/2023  Spoke with patient he did contact his primary and his Lisinopril was increased from 10mg to 30mg daily.  He hasn't been able to have his pressure checked.     The patient is optimized for their procedure. AVS with information on surgery time/arrival time, meds and NPO status given by nursing staff. No further diagnostic testing indicated.      On the day of service:     Prep time: 10 minutes  Visit time: 15 minutes  Documentation time: 10 minutes  ------------------------------------------  Total time: 35 minutes      TESHA Forte CNP  Preoperative Assessment Center  [facility]  Clinic and Surgery Center  Phone: [phone]  Fax: [phone]

## 2023-03-21 NOTE — PATIENT INSTRUCTIONS
Preparing for Your Surgery      Name:  Tino Gleason   MRN:  [id number]   :  1970   Today's Date:  3/21/2023       Arriving for surgery:  Surgery date:  3/29/23  Arrival time:  10:00 am     Surgeries and procedures: Adult patients can have 2 visitors all through the surgery process.     Visiting hours: 8 a.m. to 8:30 p.m.     Hospital: Adult patients and children under age 18 can have 4 visitor at a time     No visitors under the age of 5 are allowed for hospital patients.  Double occupancy rooms: Patients can have only two visitors at a time.     Patients with disabilities: Can have a support person with them (family member, service provider     Or someone well informed about their needs) plus the allowed number of visitors     Patients confirmed or suspected to have symptoms of COVID 19 or flu:     No visitors allowed for adult patients.   Children (under age 18) can have 1 named visitor.     People who are sick or showing symptoms of COVID 19 or flu:    Are not allowed to visit patients--we can only make exceptions in special situations.       Please follow these guidelines for your visit:   Arrive wearing a mask over your mouth and nose; we will give you a medical mask to wear    If you arrive wearing a cloth mask.   Keep it on during your entire visit, even when in patient's room.   If you don't wear a mask we'll ask you to leave.     Clean your hands with alcohol hand . Do this when you arrive at and leave the building and patient room,    And again after you touch your mask or anything in the room.     You can t visit if you have a fever, cough, shortness of breath, muscle aches, headaches, sore throat    Or diarrhea      Stay 6 feet away from others during your visit and between visits     Go directly to and from the room you are visiting.     Stay in the patient s room during your visit. Limit going to other places in the hospital as much as possible     Leave bags and jackets at home or in  the car.     For everyone s health, please don t come and go during your visit. That includes for smoking   during your visit.     Please come to:     [facility] [address]  [address] [place]  [address]    - parking is available in front of [facility] from 5:15AM to 8:00PM. If you prefer, park your car in the Green Lot.  -Proceed to the 3rd floor, check in at the Adult Surgery Waiting Lounge. [phone]    If an escort is needed stop at the Information Desk in the lobby. Inform the information person that you are here for surgery. An escort to the Adult Surgery Waiting Lounge will be provided.    What can I eat or drink?  -  You may eat and drink normally up to 8 hours prior to arrival time.   -  You may have clear liquids until 2 hours prior to arrival time.     Examples of clear liquids:  Water  Clear broth  Juices (apple, white grape, white cranberry  and cider) without pulp  Noncarbonated, powder based beverages  (lemonade and Odin-Aid)  Sodas (Sprite, 7-Up, ginger ale and seltzer)  Coffee or tea (without milk or cream)  Gatorade    -  No Alcohol for at least 24 hours before surgery.     Which medicines can I take?    Hold Aspirin for 7 days before surgery.   Hold Multivitamins for 7 days before surgery.  Hold Supplements for 7 days before surgery.  Hold Ibuprofen (Advil, Motrin) for 1 day(s) before surgery--unless otherwise directed by surgeon.  Hold Naproxen (Aleve) for 4 days before surgery.  Hold cialis x 24 hours before surgery.  -  DO NOT take these medications the day of surgery:  Metformin.    -  PLEASE TAKE these medications the day of surgery:  Tylenol if needed; take other morning medications.    How do I prepare myself?  - Please take 2 showers (one the night prior to surgery and one the morning of surgery) using Scrubcare or Hibiclens soap.    Use this soap only from the neck to your toes.     Leave the soap on  your skin for one minute--then rinse thoroughly.      You may use your own shampoo and conditioner. No other hair products.   - Please remove all jewelry and body piercings.  - No lotions, deodorants or fragrance.  - No makeup or fingernail polish.   - Bring your ID and insurance card.    -If you have a Deep Brain Stimulator, Spinal Cord Stimulator, or any Neuro Stimulator device---you must bring the remote control to the hospital.      ALL PATIENTS GOING HOME THE SAME DAY OF SURGERY ARE REQUIRED TO HAVE A RESPONSIBLE ADULT TO DRIVE AND BE IN ATTENDANCE WITH THEM FOR 24 HOURS FOLLOWING SURGERY.    Covid testing policy as of 12/06/2022  Your surgeon will notify and schedule you for a COVID test if one is needed before surgery--please direct any questions or COVID symptoms to your surgeon      Questions or Concerns:    - For any questions regarding the day of surgery or your hospital stay, please contact the Pre Admission Nursing Office at [phone].       - If you have health changes between today and your surgery, please call your surgeon.       - For questions after surgery, please call your surgeons office.

## 2023-03-21 NOTE — H&P
Pre-Operative H & P     CC:  Preoperative exam to assess for increased cardiopulmonary risk while undergoing surgery and anesthesia.    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 He has met with Dr. Paul and presents today in preparation for the above procedure.    History is obtained from the patient and chart review    Hx of abnormal bleeding or anti-platelet use: no      Past Medical History  Past Medical History:   Diagnosis Date     Stable keratoconus of both eyes        Past Surgical History  Past Surgical History:   Procedure Laterality Date     [place] ANESTH,CORNEAL TRANSPLANT         Prior to Admission Medications  Current Outpatient Medications   Medication Sig Dispense Refill     atorvastatin (LIPITOR) 20 MG tablet Take 20 mg by mouth every morning       cyclobenzaprine (FLEXERIL) 10 MG tablet TAKE 1 TABLET BY MOUTH EVERY 8 HOURS AS NEEDED       erythromycin (ROMYCIN) 5 MG/GM ophthalmic ointment Place 0.5 inches into the right eye 4 times daily 7 g 1     erythromycin (ROMYCIN) 5 MG/GM ophthalmic ointment Place 0.5 inches into  the right eye 4 times daily 7 g 0     fluticasone (FLONASE) 50 MCG/ACT nasal spray as needed       lisinopril (ZESTRIL) 10 MG tablet Take 10 mg by mouth every morning       metFORMIN (GLUCOPHAGE XR) 500 MG 24 hr tablet Take 500 mg by mouth 2 times daily (with meals)       metoprolol succinate ER (TOPROL XL) 50 MG 24 hr tablet Take 50 mg by mouth 2 times daily       prednisoLONE acetate (PRED FORTE) 1 % ophthalmic suspension Place 1 drop into the right eye every morning       sodium chloride 0.9 % neb solution 5 mLs by Other route 2 times daily 500 mL 11     tadalafil (CIALIS) 2.5 MG tablet Take 2.5 mg by mouth daily as needed       ofloxacin (OCUFLOX) 0.3 % ophthalmic solution Place 1 drop into the right eye 4 times daily (Patient not taking: Reported on 3/21/2023) 5 mL 0     ofloxacin (OCUFLOX) 0.3 % ophthalmic solution Place 1 drop into the right eye 4 times daily (Patient not taking: Reported on 3/21/2023) 5 mL 0     prednisoLONE acetate (PRED FORTE) 1 % ophthalmic suspension Place 1 drop into the right eye 4 times daily (Patient not taking: Reported on 3/21/2023) 5 mL 0     prednisoLONE acetate (PRED FORTE) 1 % ophthalmic suspension Place 1 drop into the right eye 4 times daily (Patient not taking: Reported on 3/21/2023) 5 mL 0       Allergies  Allergies   Allergen Reactions     Hydrocodone-Acetaminophen Hives and Swelling     Naproxen GI Disturbance       Social History  Social History     Socioeconomic History     Marital status:      Spouse name: Not on file     Number of children: Not on file     Years of education: Not on file     Highest education level: Not on file   Occupational History     Not on file   Tobacco Use     Smoking status: Never     Smokeless tobacco: Never   Substance and Sexual Activity     Alcohol use: Yes     Alcohol/week: 10.0 standard drinks     Types: 10 Standard drinks or equivalent per week     Drug use: Never     Sexual activity: Not on file   Other Topics Concern     Not on  "file   Social History Narrative     Not on file     Social Determinants of Health     Financial Resource Strain: Not on file   Food Insecurity: Not on file   Transportation Needs: Not on file   Physical Activity: Not on file   Stress: Not on file   Social Connections: Not on file   Intimate Partner Violence: Not on file   Housing Stability: Not on file       Family History  Family History   Problem Relation Age of Onset     Glaucoma No family hx of      Macular Degeneration No family hx of        Review of Systems  The complete review of systems is negative other than noted in the HPI or here.   Anesthesia Evaluation   Pt has had prior anesthetic. Type: MAC and General.    No history of anesthetic complications       ROS/MED HX  ENT/Pulmonary:     (+) sleep apnea, doesn't use CPAP,  (-) tobacco use and asthma   Neurologic:  - neg neurologic ROS     Cardiovascular:     (+) Dyslipidemia hypertension----- (-) CARBAJAL, orthopnea/PND, syncope and irregular heartbeat/palpitations   METS/Exercise Tolerance: >4 METS Comment: , lifts heavy machinery, walks often at work. Has walked at most 16,000 feet   Hematologic:  - neg hematologic  ROS     Musculoskeletal: Comment: Rotator cuff repair ( right) 2012  Achilles tendon repair as a teen      GI/Hepatic:  - neg GI/hepatic ROS     Renal/Genitourinary:  - neg Renal ROS     Endo:     (+) type II DM, Last HgA1c: 12.6, date: 2/21/2023, Not using insulin, Obesity,     Psychiatric/Substance Use:     (+) psychiatric history depression  (-) alcohol abuse history   Infectious Disease:  - neg infectious disease ROS     Malignancy:  - neg malignancy ROS     Other: Comment: (Right) Corneal transplant 2008, 2010            BP (!) 168/105 (BP Location: Right arm, Patient Position: Sitting, Cuff Size: Adult Large)   Pulse 65   Temp 98  F (36.7  C) (Oral)   Resp 16   Ht 1.803 m (5' 11\")   Wt (!) 150.6 kg (332 lb 1.6 oz)   SpO2 97%   BMI 46.32 kg/m      Physical Exam "   Constitutional: Awake, alert, cooperative, no apparent distress, and appears stated age.  Eyes: Pupils equal, round and reactive to light, extra ocular muscles intact, sclera clear, conjunctiva normal.  HENT: Normocephalic, oral pharynx with moist mucus membranes, good dentition. No goiter appreciated.   Respiratory: Clear to auscultation bilaterally, no crackles or wheezing.  Cardiovascular: Regular rate and rhythm, normal S1 and S2, and no murmur noted.  Carotids +2, no bruits. No edema. Palpable pulses to radial  DP and PT arteries.   GI: Normal bowel sounds, soft and non-tender. Unable to adequately assess for hepatosplenomegaly given obese abdomen. No superficial masses noted.   Lymph/Hematologic: No cervical lymphadenopathy and no supraclavicular lymphadenopathy.  Genitourinary:  deferred  Skin: Warm and dry.  No rashes at anticipated surgical site.   Musculoskeletal: Full ROM of neck. There is no redness, warmth, or swelling of the joints. Gross motor strength is normal.    Neurologic: Awake, alert, oriented to name, place and time. Cranial nerves II-XII are grossly intact. Gait is normal.   Neuropsychiatric: Calm, cooperative. Normal affect.     Prior Labs/Diagnostic Studies   All labs and imaging personally reviewed   Last Comprehensive Metabolic Panel:  Last Comprehensive Metabolic Panel:  Lab Results   Component Value Date     03/21/2023    POTASSIUM 4.2 03/21/2023    CHLORIDE 104 03/21/2023    CO2 26 03/21/2023    ANIONGAP 10 03/21/2023     (H) 03/21/2023    BUN 11.6 03/21/2023    CR 0.90 03/21/2023    GFRESTIMATED >90 03/21/2023    MINDY 9.6 03/21/2023         Lab Results   Component Value Date    A1C 12.6 02/21/2023         EKG/ stress test - if available please see in ROS above   No results found.  No flowsheet data found.      The patient's records and results personally reviewed by this provider.     Outside records reviewed from: Care Everywhere    LAB/DIAGNOSTIC STUDIES TODAY:   "  BMP    Assessment      Tino Gleason is a 52 year old male seen as a PAC referral for risk assessment and optimization for anesthesia.    Plan/Recommendations  Pt will be optimized for the proposed procedure.  See below for details on the assessment, risk, and preoperative recommendations    NEUROLOGY  - No history of TIA, CVA or seizure  -Post Op delirium risk factors:  No risk identified    ENT  - No current airway concerns.  Will need to be reassessed day of surgery.  Mallampati: I  TM: > 3  Thick Neck    CARDIAC  - No history of CAD and Afib   No anginal symptoms, Denies palpitations, PND, dizziness or syncope.     - Hypertension- continues to be elevated 168/105  Blood pressure elevated at today's exam.  Recommended checking over the next two weeks and if remains >160/90, instructed to follow up with PCP for further evaluation and treatment.  - METS (Metabolic Equivalents)  Patient performs 4 or more METS exercise without symptoms            Total Score: 0      RCRI-Very low risk: Class 1 0.4% complication rate            Total Score: 0        PULMONARY  - Obstructive Sleep Apnea  JEY without home CPAP use.      - Tobacco History      History   Smoking Status     Never   Smokeless Tobacco     Never       GI    PONV Low Risk  Total Score: 1           1 AN PONV: Patient is not a current smoker        /RENAL  - Baseline Creatinine  WNL    ENDOCRINE    - BMI: Estimated body mass index is 46.32 kg/m  as calculated from the following:    Height as of this encounter: 1.803 m (5' 11\").    Weight as of this encounter: 150.6 kg (332 lb 1.6 oz).  Class 3 Obesity (BMI > 40)  - Diabetes  Diabetes Mellitus, Type 2, non-insulin dependent.  Hold morning oral hypoglycemic medications. Recommend close monitoring of the patient's blood glucose levels throughout the perioperative period.  Lab Results   Component Value Date    A1C 12.6 02/21/2023         HEME  VTE Low Risk 0.5%            Total Score: 3    VTE: BMI greater " than 39    VTE: Male      - No history of abnormal bleeding or antiplatelet use.      MSK  Patient is NOT Frail            Total Score: 0          PSYCH  - history of remote depression no longer requiring medications  Currently his wife is in the hospital on a ventilator      Different anesthesia methods/types have been discussed with the patient, but they are aware that the final plan will be decided by the assigned anesthesia provider on the date of service.    Recommendations: Patient to contact primary care today and request increasing his lisinopril to 20 mg daily. Will notify Dr. Ling regarding patients continued elevated blood pressures. He is currently dealing with his wife who is hospitalized and on a ventilator.   ADDEND 3/27/2023  Spoke with patient he did contact his primary and his Lisinopril was increased from 10mg to 30mg daily.  He hasn't been able to have his pressure checked.     The patient is optimized for their procedure. AVS with information on surgery time/arrival time, meds and NPO status given by nursing staff. No further diagnostic testing indicated.      On the day of service:     Prep time: 10 minutes  Visit time: 15 minutes  Documentation time: 10 minutes  ------------------------------------------  Total time: 35 minutes      TESHA Forte CNP  Preoperative Assessment Center  [facility]  [facility]  Phone: [phone]  Fax: [phone]

## 2023-03-23 ENCOUNTER — TELEPHONE (OUTPATIENT)
Dept: SURGERY | Facility: CLINIC | Age: 53
End: 2023-03-23
Payer: COMMERCIAL

## 2023-03-23 NOTE — TELEPHONE ENCOUNTER
Spoke with Tino for an update in blood pressure management.  Per Tino, he called his PCP who recommended increasing his Lisinopril to 30 mg daily.  Tino hasn't checked his blood pressure since starting this new dose.  Tracy Wells RN

## 2023-03-29 ENCOUNTER — HOSPITAL ENCOUNTER (OUTPATIENT)
Facility: CLINIC | Age: 53
Discharge: HOME | End: 2023-03-29
Attending: OPHTHALMOLOGY | Admitting: OPHTHALMOLOGY
Payer: COMMERCIAL

## 2023-03-29 ENCOUNTER — ANESTHESIA (OUTPATIENT)
Dept: SURGERY | Facility: CLINIC | Age: 53
End: 2023-03-29
Payer: COMMERCIAL

## 2023-03-29 VITALS
OXYGEN SATURATION: 98 % | WEIGHT: 315 LBS | HEART RATE: 72 BPM | RESPIRATION RATE: 14 BRPM | TEMPERATURE: 98.1 F | BODY MASS INDEX: 44.1 KG/M2 | DIASTOLIC BLOOD PRESSURE: 79 MMHG | SYSTOLIC BLOOD PRESSURE: 135 MMHG | HEIGHT: 71 IN

## 2023-03-29 LAB
GLUCOSE BLDC GLUCOMTR-MCNC: 153 MG/DL (ref 70–99)
GLUCOSE BLDC GLUCOMTR-MCNC: 176 MG/DL (ref 70–99)

## 2023-03-29 PROCEDURE — 65756 CORNEAL TRNSPL ENDOTHELIAL: CPT | Mod: 53 | Performed by: OPHTHALMOLOGY

## 2023-03-29 PROCEDURE — 250N000011 HC RX IP 250 OP 636

## 2023-03-29 PROCEDURE — 710N000010 HC RECOVERY PHASE 1, LEVEL 2, PER MIN: Performed by: OPHTHALMOLOGY

## 2023-03-29 PROCEDURE — 250N000011 HC RX IP 250 OP 636: Performed by: OPHTHALMOLOGY

## 2023-03-29 PROCEDURE — 370N000017 HC ANESTHESIA TECHNICAL FEE, PER MIN: Performed by: OPHTHALMOLOGY

## 2023-03-29 PROCEDURE — 360N000077 HC SURGERY LEVEL 4, PER MIN: Performed by: OPHTHALMOLOGY

## 2023-03-29 PROCEDURE — 258N000003 HC RX IP 258 OP 636

## 2023-03-29 PROCEDURE — 250N000009 HC RX 250: Performed by: OPHTHALMOLOGY

## 2023-03-29 PROCEDURE — 272N000001 HC OR GENERAL SUPPLY STERILE: Performed by: OPHTHALMOLOGY

## 2023-03-29 PROCEDURE — V2785 CORNEAL TISSUE PROCESSING: HCPCS | Performed by: OPHTHALMOLOGY

## 2023-03-29 PROCEDURE — 250N000025 HC SEVOFLURANE, PER MIN: Performed by: OPHTHALMOLOGY

## 2023-03-29 PROCEDURE — 82962 GLUCOSE BLOOD TEST: CPT

## 2023-03-29 PROCEDURE — 999N000141 HC STATISTIC PRE-PROCEDURE NURSING ASSESSMENT: Performed by: OPHTHALMOLOGY

## 2023-03-29 PROCEDURE — 250N000009 HC RX 250

## 2023-03-29 PROCEDURE — 710N000012 HC RECOVERY PHASE 2, PER MINUTE: Performed by: OPHTHALMOLOGY

## 2023-03-29 RX ORDER — LIDOCAINE HYDROCHLORIDE 20 MG/ML
INJECTION, SOLUTION INFILTRATION; PERINEURAL PRN
Status: DISCONTINUED | OUTPATIENT
Start: 2023-03-29 | End: 2023-03-29

## 2023-03-29 RX ORDER — SODIUM CHLORIDE, SODIUM LACTATE, POTASSIUM CHLORIDE, CALCIUM CHLORIDE 600; 310; 30; 20 MG/100ML; MG/100ML; MG/100ML; MG/100ML
INJECTION, SOLUTION INTRAVENOUS CONTINUOUS
Status: CANCELLED | OUTPATIENT
Start: 2023-03-29

## 2023-03-29 RX ORDER — KETOROLAC TROMETHAMINE 30 MG/ML
INJECTION, SOLUTION INTRAMUSCULAR; INTRAVENOUS PRN
Status: DISCONTINUED | OUTPATIENT
Start: 2023-03-29 | End: 2023-03-29

## 2023-03-29 RX ORDER — HYDROMORPHONE HYDROCHLORIDE 1 MG/ML
0.2 INJECTION, SOLUTION INTRAMUSCULAR; INTRAVENOUS; SUBCUTANEOUS EVERY 5 MIN PRN
Status: CANCELLED | OUTPATIENT
Start: 2023-03-29

## 2023-03-29 RX ORDER — HYDROMORPHONE HYDROCHLORIDE 1 MG/ML
0.4 INJECTION, SOLUTION INTRAMUSCULAR; INTRAVENOUS; SUBCUTANEOUS EVERY 5 MIN PRN
Status: CANCELLED | OUTPATIENT
Start: 2023-03-29

## 2023-03-29 RX ORDER — DEXAMETHASONE SODIUM PHOSPHATE 4 MG/ML
INJECTION, SOLUTION INTRA-ARTICULAR; INTRALESIONAL; INTRAMUSCULAR; INTRAVENOUS; SOFT TISSUE PRN
Status: DISCONTINUED | OUTPATIENT
Start: 2023-03-29 | End: 2023-03-29

## 2023-03-29 RX ORDER — FENTANYL CITRATE 50 UG/ML
25 INJECTION, SOLUTION INTRAMUSCULAR; INTRAVENOUS EVERY 5 MIN PRN
Status: CANCELLED | OUTPATIENT
Start: 2023-03-29

## 2023-03-29 RX ORDER — FENTANYL CITRATE 50 UG/ML
50 INJECTION, SOLUTION INTRAMUSCULAR; INTRAVENOUS EVERY 5 MIN PRN
Status: CANCELLED | OUTPATIENT
Start: 2023-03-29

## 2023-03-29 RX ORDER — SODIUM CHLORIDE, SODIUM LACTATE, POTASSIUM CHLORIDE, CALCIUM CHLORIDE 600; 310; 30; 20 MG/100ML; MG/100ML; MG/100ML; MG/100ML
INJECTION, SOLUTION INTRAVENOUS CONTINUOUS PRN
Status: DISCONTINUED | OUTPATIENT
Start: 2023-03-29 | End: 2023-03-29

## 2023-03-29 RX ORDER — FENTANYL CITRATE 50 UG/ML
INJECTION, SOLUTION INTRAMUSCULAR; INTRAVENOUS PRN
Status: DISCONTINUED | OUTPATIENT
Start: 2023-03-29 | End: 2023-03-29

## 2023-03-29 RX ORDER — BALANCED SALT SOLUTION 6.4; .75; .48; .3; 3.9; 1.7 MG/ML; MG/ML; MG/ML; MG/ML; MG/ML; MG/ML
SOLUTION OPHTHALMIC PRN
Status: DISCONTINUED | OUTPATIENT
Start: 2023-03-29 | End: 2023-03-29 | Stop reason: HOSPADM

## 2023-03-29 RX ORDER — PREDNISOLONE ACETATE 10 MG/ML
SUSPENSION/ DROPS OPHTHALMIC PRN
Status: DISCONTINUED | OUTPATIENT
Start: 2023-03-29 | End: 2023-03-29 | Stop reason: HOSPADM

## 2023-03-29 RX ORDER — PROPARACAINE HYDROCHLORIDE 5 MG/ML
1 SOLUTION/ DROPS OPHTHALMIC ONCE
Status: DISCONTINUED | OUTPATIENT
Start: 2023-03-29 | End: 2023-03-29 | Stop reason: HOSPADM

## 2023-03-29 RX ORDER — PROPOFOL 10 MG/ML
INJECTION, EMULSION INTRAVENOUS PRN
Status: DISCONTINUED | OUTPATIENT
Start: 2023-03-29 | End: 2023-03-29

## 2023-03-29 RX ORDER — ONDANSETRON 4 MG/1
4 TABLET, ORALLY DISINTEGRATING ORAL EVERY 30 MIN PRN
Status: CANCELLED | OUTPATIENT
Start: 2023-03-29

## 2023-03-29 RX ORDER — ONDANSETRON 2 MG/ML
INJECTION INTRAMUSCULAR; INTRAVENOUS PRN
Status: DISCONTINUED | OUTPATIENT
Start: 2023-03-29 | End: 2023-03-29

## 2023-03-29 RX ORDER — ONDANSETRON 2 MG/ML
4 INJECTION INTRAMUSCULAR; INTRAVENOUS EVERY 30 MIN PRN
Status: CANCELLED | OUTPATIENT
Start: 2023-03-29

## 2023-03-29 RX ADMIN — DEXAMETHASONE SODIUM PHOSPHATE 8 MG: 4 INJECTION, SOLUTION INTRA-ARTICULAR; INTRALESIONAL; INTRAMUSCULAR; INTRAVENOUS; SOFT TISSUE at 12:32

## 2023-03-29 RX ADMIN — KETOROLAC TROMETHAMINE 15 MG: 30 INJECTION, SOLUTION INTRAMUSCULAR at 13:31

## 2023-03-29 RX ADMIN — SODIUM CHLORIDE, POTASSIUM CHLORIDE, SODIUM LACTATE AND CALCIUM CHLORIDE: 600; 310; 30; 20 INJECTION, SOLUTION INTRAVENOUS at 11:57

## 2023-03-29 RX ADMIN — Medication 50 MG: at 12:11

## 2023-03-29 RX ADMIN — SUGAMMADEX 400 MG: 100 INJECTION, SOLUTION INTRAVENOUS at 13:28

## 2023-03-29 RX ADMIN — PROPOFOL 100 MG: 10 INJECTION, EMULSION INTRAVENOUS at 12:20

## 2023-03-29 RX ADMIN — PHENYLEPHRINE HYDROCHLORIDE 100 MCG: 10 INJECTION INTRAVENOUS at 12:32

## 2023-03-29 RX ADMIN — PHENYLEPHRINE HYDROCHLORIDE 100 MCG: 10 INJECTION INTRAVENOUS at 12:43

## 2023-03-29 RX ADMIN — SODIUM CHLORIDE, POTASSIUM CHLORIDE, SODIUM LACTATE AND CALCIUM CHLORIDE: 600; 310; 30; 20 INJECTION, SOLUTION INTRAVENOUS at 13:14

## 2023-03-29 RX ADMIN — FENTANYL CITRATE 100 MCG: 50 INJECTION, SOLUTION INTRAMUSCULAR; INTRAVENOUS at 12:09

## 2023-03-29 RX ADMIN — LIDOCAINE HYDROCHLORIDE 80 MG: 20 INJECTION, SOLUTION INFILTRATION; PERINEURAL at 12:09

## 2023-03-29 RX ADMIN — ONDANSETRON 4 MG: 2 INJECTION INTRAMUSCULAR; INTRAVENOUS at 13:31

## 2023-03-29 RX ADMIN — PHENYLEPHRINE HYDROCHLORIDE 100 MCG: 10 INJECTION INTRAVENOUS at 12:50

## 2023-03-29 RX ADMIN — PROPOFOL 300 MG: 10 INJECTION, EMULSION INTRAVENOUS at 12:09

## 2023-03-29 RX ADMIN — MIDAZOLAM 2 MG: 1 INJECTION INTRAMUSCULAR; INTRAVENOUS at 12:01

## 2023-03-29 RX ADMIN — PHENYLEPHRINE HYDROCHLORIDE 0.4 MCG/KG/MIN: 10 INJECTION INTRAVENOUS at 12:46

## 2023-03-29 RX ADMIN — PHENYLEPHRINE HYDROCHLORIDE 100 MCG: 10 INJECTION INTRAVENOUS at 13:26

## 2023-03-29 ASSESSMENT — LIFESTYLE VARIABLES: TOBACCO_USE: 0

## 2023-03-29 ASSESSMENT — ACTIVITIES OF DAILY LIVING (ADL)
ADLS_ACUITY_SCORE: 35

## 2023-03-29 ASSESSMENT — ENCOUNTER SYMPTOMS: ORTHOPNEA: 0

## 2023-03-29 NOTE — ANESTHESIA PREPROCEDURE EVALUATION
Pre-Operative H & P     CC:  Preoperative exam to assess for increased cardiopulmonary risk while undergoing surgery and anesthesia.    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 He has met with Dr. Paul and presents today in preparation for the above procedure.    History is obtained from the patient and chart review    Hx of abnormal bleeding or anti-platelet use: no      Past Medical History  Past Medical History:   Diagnosis Date     Stable keratoconus of both eyes        Past Surgical History  Past Surgical History:   Procedure Laterality Date     ID ANESTH,CORNEAL TRANSPLANT         Prior to Admission Medications  No current outpatient medications on file.       Allergies  Allergies   Allergen Reactions     Hydrocodone-Acetaminophen Hives and Swelling     Naproxen GI Disturbance       Social History  Social History     Socioeconomic History     Marital status:      Spouse name: Not on file     Number of children: Not on file     Years of education: Not on file     Highest education level: Not on file   Occupational  History     Not on file   Tobacco Use     Smoking status: Never     Smokeless tobacco: Never   Substance and Sexual Activity     Alcohol use: Yes     Alcohol/week: 10.0 standard drinks     Types: 10 Standard drinks or equivalent per week     Drug use: Never     Sexual activity: Not on file   Other Topics Concern     Not on file   Social History Narrative     Not on file     Social Determinants of Health     Financial Resource Strain: Not on file   Food Insecurity: Not on file   Transportation Needs: Not on file   Physical Activity: Not on file   Stress: Not on file   Social Connections: Not on file   Intimate Partner Violence: Not on file   Housing Stability: Not on file       Family History  Family History   Problem Relation Age of Onset     Glaucoma No family hx of      Macular Degeneration No family hx of        Review of Systems  The complete review of systems is negative other than noted in the HPI or here.   Anesthesia Evaluation   Pt has had prior anesthetic. Type: MAC and General.    No history of anesthetic complications       ROS/MED HX  ENT/Pulmonary:     (+) sleep apnea, doesn't use CPAP,  (-) tobacco use and asthma   Neurologic:  - neg neurologic ROS     Cardiovascular:     (+) Dyslipidemia hypertension----- (-) CARBAJAL, orthopnea/PND, syncope and irregular heartbeat/palpitations   METS/Exercise Tolerance: >4 METS Comment: , lifts heavy machinery, walks often at work. Has walked at most 16,000 feet   Hematologic:  - neg hematologic  ROS     Musculoskeletal: Comment: Rotator cuff repair ( right) 2012  Achilles tendon repair as a teen      GI/Hepatic:  - neg GI/hepatic ROS     Renal/Genitourinary:  - neg Renal ROS     Endo:     (+) type II DM, Last HgA1c: 12.6, date: 2/21/2023, Not using insulin, Obesity,     Psychiatric/Substance Use:     (+) psychiatric history depression  (-) alcohol abuse history   Infectious Disease:  - neg infectious disease ROS     Malignancy:  - neg malignancy ROS     Other:  Comment: (Right) Corneal transplant 2008, 2010            There were no vitals taken for this visit.    Physical Exam   Constitutional: Awake, alert, cooperative, no apparent distress, and appears stated age.  Eyes: Pupils equal, round and reactive to light, extra ocular muscles intact, sclera clear, conjunctiva normal.  HENT: Normocephalic, oral pharynx with moist mucus membranes, good dentition. No goiter appreciated.   Respiratory: Clear to auscultation bilaterally, no crackles or wheezing.  Cardiovascular: Regular rate and rhythm, normal S1 and S2, and no murmur noted.  Carotids +2, no bruits. No edema. Palpable pulses to radial  DP and PT arteries.   GI: Normal bowel sounds, soft and non-tender. Unable to adequately assess for hepatosplenomegaly given obese abdomen. No superficial masses noted.   Lymph/Hematologic: No cervical lymphadenopathy and no supraclavicular lymphadenopathy.  Genitourinary:  deferred  Skin: Warm and dry.  No rashes at anticipated surgical site.   Musculoskeletal: Full ROM of neck. There is no redness, warmth, or swelling of the joints. Gross motor strength is normal.    Neurologic: Awake, alert, oriented to name, place and time. Cranial nerves II-XII are grossly intact. Gait is normal.   Neuropsychiatric: Calm, cooperative. Normal affect.     Prior Labs/Diagnostic Studies   All labs and imaging personally reviewed   Last Comprehensive Metabolic Panel:  Last Comprehensive Metabolic Panel:  Lab Results   Component Value Date     03/21/2023    POTASSIUM 4.2 03/21/2023    CHLORIDE 104 03/21/2023    CO2 26 03/21/2023    ANIONGAP 10 03/21/2023     (H) 03/21/2023    BUN 11.6 03/21/2023    CR 0.90 03/21/2023    GFRESTIMATED >90 03/21/2023    MINDY 9.6 03/21/2023         Lab Results   Component Value Date    A1C 12.6 02/21/2023         EKG/ stress test - if available please see in ROS above   No results found.  No flowsheet data found.      The patient's records and results personally  "reviewed by this provider.     Outside records reviewed from: Care Everywhere    LAB/DIAGNOSTIC STUDIES TODAY:    BMP    Assessment      Tino Gleason is a 52 year old male seen as a PAC referral for risk assessment and optimization for anesthesia.    Plan/Recommendations  Pt will be optimized for the proposed procedure.  See below for details on the assessment, risk, and preoperative recommendations    NEUROLOGY  - No history of TIA, CVA or seizure  -Post Op delirium risk factors:  No risk identified    ENT  - No current airway concerns.  Will need to be reassessed day of surgery.  Mallampati: I  TM: > 3  Thick Neck    CARDIAC  - No history of CAD and Afib   No anginal symptoms, Denies palpitations, PND, dizziness or syncope.     - Hypertension- continues to be elevated 168/105  Blood pressure elevated at today's exam.  Recommended checking over the next two weeks and if remains >160/90, instructed to follow up with PCP for further evaluation and treatment.  - METS (Metabolic Equivalents)  Patient performs 4 or more METS exercise without symptoms            Total Score: 0      RCRI-Very low risk: Class 1 0.4% complication rate            Total Score: 0        PULMONARY  - Obstructive Sleep Apnea  JEY without home CPAP use.      - Tobacco History      History   Smoking Status     Never   Smokeless Tobacco     Never       GI    PONV Low Risk  Total Score: 1           1 AN PONV: Patient is not a current smoker        /RENAL  - Baseline Creatinine  WNL    ENDOCRINE    - BMI: Estimated body mass index is 46.43 kg/m  as calculated from the following:    Height as of an earlier encounter on 3/29/23: 1.803 m (5' 11\").    Weight as of an earlier encounter on 3/29/23: 151 kg (332 lb 14.3 oz).  Class 3 Obesity (BMI > 40)  - Diabetes  Diabetes Mellitus, Type 2, non-insulin dependent.  Hold morning oral hypoglycemic medications. Recommend close monitoring of the patient's blood glucose levels throughout the perioperative " period.  Lab Results   Component Value Date    A1C 12.6 02/21/2023         HEME  VTE Low Risk 0.5%            Total Score: 3    VTE: BMI greater than 39    VTE: Male      - No history of abnormal bleeding or antiplatelet use.      MSK  Patient is NOT Frail            Total Score: 0          PSYCH  - history of remote depression no longer requiring medications  Currently his wife is in the hospital on a ventilator      Different anesthesia methods/types have been discussed with the patient, but they are aware that the final plan will be decided by the assigned anesthesia provider on the date of service.    Recommendations: Patient to contact primary care today and request increasing his lisinopril to 20 mg daily. Will notify Dr. Ling regarding patients continued elevated blood pressures. He is currently dealing with his wife who is hospitalized and on a ventilator.   ADDEND 3/27/2023  Spoke with patient he did contact his primary and his Lisinopril was increased from 10mg to 30mg daily.  He hasn't been able to have his pressure checked.     The patient is optimized for their procedure. AVS with information on surgery time/arrival time, meds and NPO status given by nursing staff. No further diagnostic testing indicated.      On the day of service:     Prep time: 10 minutes  Visit time: 15 minutes  Documentation time: 10 minutes  ------------------------------------------  Total time: 35 minutes      TESHA Forte CNP  Preoperative Assessment Center  [facility]  Phone: [phone]  Fax: [phone]    Physical Exam    Airway        Mallampati: II   TM distance: > 3 FB   Neck ROM: full   Mouth opening: > 3 cm    Respiratory Devices and Support         Dental       (+) Minor Abnormalities - some fillings, tiny chips      Cardiovascular   cardiovascular exam normal          Pulmonary   pulmonary exam normal                  Anesthesia Plan    ASA Status:  3   NPO  Status:  NPO Appropriate    Anesthesia Type: General.     - Airway: ETT   Induction: Intravenous, Propofol.   Maintenance: Balanced.        Consents    Anesthesia Plan(s) and associated risks, benefits, and realistic alternatives discussed. Questions answered and patient/representative(s) expressed understanding.     - Discussed: Risks, Benefits and Alternatives for the PROCEDURE were discussed     - Discussed with:  Patient      - Extended Intubation/Ventilatory Support Discussed: No.      - Patient is DNR/DNI Status: No    Use of blood products discussed: No .     Postoperative Care    Pain management: IV analgesics, Oral pain medications, Multi-modal analgesia.   PONV prophylaxis: Ondansetron (or other 5HT-3), Dexamethasone or Solumedrol     Comments:

## 2023-03-29 NOTE — ANESTHESIA CARE TRANSFER NOTE
Patient: Tino Gleason    Procedure: Procedure(s):  ATTEMPTED DESCEMET'S MEMBRANE ENDOTHELIAL KERATOPLASTY (DMEK) RIGHT, VIEW OBSCURED, PROCEDURE ABORTED.       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    Anesthesia Type:   General     Note:    Oropharynx: oropharynx clear of all foreign objects and spontaneously breathing  Level of Consciousness: awake  Oxygen Supplementation: face mask  Level of Supplemental Oxygen (L/min / FiO2): 6  Independent Airway: airway patency satisfactory and stable  Dentition: dentition unchanged  Vital Signs Stable: post-procedure vital signs reviewed and stable  Report to RN Given: handoff report given  Patient transferred to: PACU    Handoff Report: Identifed the Patient, Identified the Reponsible Provider, Reviewed the pertinent medical history, Discussed the surgical course, Reviewed Intra-OP anesthesia mangement and issues during anesthesia, Set expectations for post-procedure period and Allowed opportunity for questions and acknowledgement of understanding      Vitals:  Vitals Value Taken Time   /68    Temp 98    Pulse 70 03/29/23 1354   Resp 25 03/29/23 1354   SpO2 98 % 03/29/23 1354   Vitals shown include unvalidated device data.    Electronically Signed By: TESHA Alcantara CRNA  March 29, 2023  1:56 PM

## 2023-03-29 NOTE — ANESTHESIA PROCEDURE NOTES
Airway         Procedure Start/Stop Times: 3/29/2023 12:25 PM  Staff -        Anesthesiologist:  Rey Lopez MD       CRNA: Haylee León APRN CRNA       Performed By: CRNA and anesthesiologistIndications and Patient Condition       Indications for airway management: rose-procedural       Induction type:intravenous       Mask difficulty assessment: 3 - difficult mask (inadequate, unstable, or two providers) +/- NMBA (two handed mask with oral airway, no neck)    Final Airway Details       Final airway type: endotracheal airway       Successful airway: ETT - single and Oral  Endotracheal Airway Details        ETT size (mm): 8.0       Cuffed: yes       Successful intubation technique: video laryngoscopy       VL Blade Size: MAC D Blade       Grade View of Cords: 1       Adjucts: stylet       Position: Left       Measured from: gums/teeth       Secured at (cm): 26       Bite block used: None    Post intubation assessment        Placement verified by: capnometry, equal breath sounds and chest rise        Number of attempts at approach: 2       Number of other approaches attempted: 0       Secured with: silk tape       Ease of procedure: easy (with CMAC D blade)       Dentition: Intact and Unchanged    Medication(s) Administered   Medication Administration Time: 3/29/2023 12:25 PM    Additional Comments       No neck, difficult to mask, two handed and with oral airway. Masking easier with NMBA. Intubated with 7.5 ETT, grade I view with the CMAC D blade. 7.5 ETT was buried to 27cm, exchanged 7.5 ETT for 8.0 ETT. Again obtained a grade I view with the CMAC D blade.

## 2023-03-29 NOTE — OP NOTE
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  Specimen: None    * No implants in log *    DESCRIPTION OF PROCEDURE:    The patient was identified, informed consent was obtained. The patient was then brought back to the operating room where the appropriate anesthesia monitors were connected. The patient received general anesthesia, as recommended by the anesthesia service. The patient was prepped with betadine solution on the lids and ocular surface and drapped in the standard fashion for ophthalmic surgery. A lid speculum was placed in the eye.    Using a paracentesis blade and a .12 forceps, two paracentesis incisions were created superiorly and inferiorly.  Healon was injected to deepen the chamber. A 3.0 mm keratome was used to make a temporal, triplanar clear corneal incision. Using a reverse Sinskey hook and Moria 45 degree spatula, Descemet's membrane was stripped just inside the interface of the prior penetrating keratoplasty graft-host junction and removed from the AC.  A pair of 0.12 forceps and the keratome blade were then used to widen the temporal incision to approximately 4.0 mm. Two 10-0 nylon sutures were pre placed in the incision for closure later. Irrigation and aspiration was then used to remove all residual viscoelastic from the anterior chamber and to fill the AC with BSS.     Attention was then directed to the pre-loaded Descemet's membrane endothelial keratoplasty (DMEK) tissue. The modified Junior  tube injector was then assembled and secured to a 3cc syringe using a 14F nasogastric tube. The preloaded graft was then lifted from the corneal viewing chamber using a pair of mosquito forceps. The glass tube was then attached to the distal end of the 14 fr tubing. The residual life4C media was gently flushed from the eye by pushing fluid through the injector without prolapsing the graft. Donor medium was sent for fungal culture.     The tissue was then brought to the surgical site and injected into the AC through the 3.0mm temporal wound. Care was take to avoid having the tissue reflux out of the AC with withdrawal of the Junior tube and shallowing of the chamber via a paracentesis as needed. The AC was then shallowed by burping aqueous from the paracentesis(es). Using a no-touch tapping technique, the graft was unfolded, but due to the patien'ts history of penetrating keratoplasty,and increasing corneal edema during the surgery, the cornea prevented adequate visualization of this new graft's location, orientation and unfolding. Air was injected to attempt better visualization, which reveal the graft was incorrectly situated with endothelial side up. The air was then removed through the side port, and a BSS cannula was used to refill the anterior chamber and then burp fluid to achieve adequate shallowing. The no-touch technique was used to manipulate the graft in an attempt to rotate it to endothelial side down, however, there continued to be poor visualization of the graft. The central corneal endothelium was removed with a blade and Weck-marko sponge in order to improve visualization. During attempted filling of the anterior chamber  bleeding occurred from the corneal neovascularized corneal edge and into the anterior chamber during canula insertion resulting in a small intraoperative hyphema, further worsening the view of the graft. BSS was used to flush out heme from the anterior chamber, but the view of the new  graft continued to be poor. Irrigation and aspiration was then used to remove heme from the anterior chamber, along with the the new descemet membrane graft, which was discarded. The preplaced sutures were tied and the wounds were verified to be sealed and a soft bandage contact lens was placed over the operative eye. The lid speculum and drapes were then removed. One drop each of prednisolone acetate 1%, ofloxacin, and ketorolac were placed in the patient's eye. A patch and shield were taped over the operative eye. The patient was then taken to the recovery room in stable condition having tolerated the procedure well.     Dr. Paul performed the entire case.     Sage Luz MD  Ophthalmology, PGY-3    I was present for the entire procedure(s). - Mick Paul MD

## 2023-03-29 NOTE — BRIEF OP NOTE
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  Surgeon: Surgeon(s) and Role:     * Mick Paul MD - Primary   Sage Luz MD - Assistant  Anesthesia: General  Estimated Blood Loss: Less than 1 ml    Drains: None  Specimens: * No specimens in log *  Findings:   As expected.   Complications: Inability to visualize graft through patient's cornea, intra-operative hyphema, iatrogenic iridodialysis.   Implants:   Implant Name Type Inv. Item Serial No.  Lot No. LRB No. Used Action   EYE CORNEA PROCESS FEE FOR [facility] - [id number] Lens/Eye Implant EYE CORNEA PROCESS FEE FOR [employer]  [employer] [employer] [id number] 1 Implanted and Explanted            Attending Attestation (For Attendings USE Only)...

## 2023-03-29 NOTE — ANESTHESIA POSTPROCEDURE EVALUATION
Patient: Tino Gleason    Procedure: Procedure(s):  ATTEMPTED DESCEMET'S MEMBRANE ENDOTHELIAL KERATOPLASTY (DMEK) RIGHT, VIEW OBSCURED, PROCEDURE ABORTED.       Anesthesia Type:  General    Note:  Disposition: Inpatient   Postop Pain Control: Uneventful            Sign Out: Well controlled pain   PONV: No   Neuro/Psych: Uneventful            Sign Out: Acceptable/Baseline neuro status   Airway/Respiratory: Uneventful            Sign Out: Acceptable/Baseline resp. status   CV/Hemodynamics: Uneventful            Sign Out: Acceptable CV status; No obvious hypovolemia; No obvious fluid overload   Other NRE: NONE   DID A NON-ROUTINE EVENT OCCUR? No           Last vitals:  Vitals Value Taken Time   /90 03/29/23 1415   Temp     Pulse 67 03/29/23 1428   Resp 16 03/29/23 1428   SpO2 98 % 03/29/23 1428   Vitals shown include unvalidated device data.    Electronically Signed By: Hemant Ling MD  March 29, 2023  2:29 PM

## 2023-03-29 NOTE — DISCHARGE INSTRUCTIONS
Instructions after eye surgery:   Keep the eye shield over the operated eye tonight until your appointment tomorrow.    You do not have to start taking eye drops today. Keep the patch on the eye until your follow-up appointment tomorrow.    No heavy lifting, no bending down at the waist, no water in the eye. You may otherwise position yourself normally for now.    Take tylenol as directed on the bottle if you have pain.    Please call [phone] and wait for the prompts to reach the on-call doctor if you develop severe pain, decreased vision, redness, or severe sensitivity to light.    Bring your eye drops to your appointment tomorrow.    You have a follow-up appointment with Dr. Paul tomorrow at the [facility].    Same-Day Surgery   Adult Discharge Orders & Instructions     For 24 hours after surgery:  Get plenty of rest.  A responsible adult must stay with you for at least 24 hours after you leave the hospital.   Pain medication can slow your reflexes. Do not drive or use heavy equipment.  If you have weakness or tingling, don't drive or use heavy equipment until this feeling goes away.  Mixing alcohol and pain medication can cause dizziness and slow your breathing. It can even be fatal. Do not drink alcohol while taking pain medication.  Avoid strenuous or risky activities.  Ask for help when climbing stairs.   You may feel lightheaded.  If so, sit for a few minutes before standing.  Have someone help you get up.   If you have nausea (feel sick to your stomach), drink only clear liquids such as apple juice, ginger ale, broth or 7-Up.  Rest may also help.  Be sure to drink enough fluids.  Move to a regular diet as you feel able. Take pain medications with a small amount of solid food, such as toast or crackers, to avoid nausea.   A slight fever is normal. Call the doctor if your fever is over 100 F (37.7 C) (taken under the tongue) or lasts longer than 24 hours.  You may have a dry  mouth, muscle aches, trouble sleeping or a sore throat.  These symptoms should go away after 24 hours.  Do not make important or legal decisions.   Pain Management:      1. Take pain medication (if prescribed) for pain as directed by your physician.        2. WARNING: If the pain medication you have been prescribed contains Tylenol  (acetaminophen), DO NOT take additional doses of Tylenol (acetaminophen).     Call your doctor for any of the followin.  Signs of infection (fever, growing tenderness at the surgery site, severe pain, a large amount of drainage or bleeding, foul-smelling drainage, redness, swelling).    2.  It has been over 8 to 10 hours since surgery and you are still not able to urinate (pee).    3.  Headache for over 24 hours.    4.  Numbness, tingling or weakness the day after surgery (if you had spinal anesthesia).  To contact a doctor, call _________Dr. Paul, Eye Clinic at [phone]_______ or:  '   [phone] and ask for the Resident On Call for:          _____________ opthalmology _____________________________ (answered 24 hours a day)  '   Emergency Department:  [place] [facility]: [phone]  [place] [facility]: [phone]               Rev. 10/2014

## 2023-03-30 ENCOUNTER — TELEPHONE (OUTPATIENT)
Dept: OPHTHALMOLOGY | Facility: CLINIC | Age: 53
End: 2023-03-30

## 2023-03-30 ENCOUNTER — OFFICE VISIT (OUTPATIENT)
Dept: OPHTHALMOLOGY | Facility: CLINIC | Age: 53
End: 2023-03-30
Attending: OPHTHALMOLOGY
Payer: COMMERCIAL

## 2023-03-30 ENCOUNTER — HOSPITAL ENCOUNTER (OUTPATIENT)
Facility: AMBULATORY SURGERY CENTER | Age: 53
End: 2023-03-30
Attending: OPHTHALMOLOGY
Payer: COMMERCIAL

## 2023-03-30 DIAGNOSIS — Z98.890 POSTOPERATIVE EYE STATE: Primary | ICD-10-CM

## 2023-03-30 DIAGNOSIS — H18.613 STABLE KERATOCONUS OF BOTH EYES: Primary | ICD-10-CM

## 2023-03-30 DIAGNOSIS — H18.613 STABLE KERATOCONUS OF BOTH EYES: ICD-10-CM

## 2023-03-30 DIAGNOSIS — T86.8419: ICD-10-CM

## 2023-03-30 DIAGNOSIS — T86.8411 FAILURE OF CORNEA TRANSPLANT OF RIGHT EYE: ICD-10-CM

## 2023-03-30 PROCEDURE — 99024 POSTOP FOLLOW-UP VISIT: CPT | Performed by: OPHTHALMOLOGY

## 2023-03-30 RX ORDER — ERYTHROMYCIN 5 MG/G
0.5 OINTMENT OPHTHALMIC 4 TIMES DAILY
Qty: 7 G | Refills: 4 | Status: SHIPPED | OUTPATIENT
Start: 2023-03-30 | End: 2023-04-13

## 2023-03-30 RX ORDER — PREDNISOLONE ACETATE 10 MG/ML
1 SUSPENSION/ DROPS OPHTHALMIC 4 TIMES DAILY
Qty: 5 ML | Refills: 0 | Status: SHIPPED | OUTPATIENT
Start: 2023-03-30 | End: 2023-04-13

## 2023-03-30 RX ORDER — PROPARACAINE HYDROCHLORIDE 5 MG/ML
1 SOLUTION/ DROPS OPHTHALMIC ONCE
Status: CANCELLED | OUTPATIENT
Start: 2023-03-30 | End: 2023-03-30

## 2023-03-30 RX ORDER — OFLOXACIN 3 MG/ML
1 SOLUTION/ DROPS OPHTHALMIC 4 TIMES DAILY
Qty: 5 ML | Refills: 0 | Status: SHIPPED | OUTPATIENT
Start: 2023-03-30 | End: 2023-04-27

## 2023-03-30 ASSESSMENT — SLIT LAMP EXAM - LIDS
COMMENTS: NORMAL
COMMENTS: NORMAL

## 2023-03-30 ASSESSMENT — VISUAL ACUITY
OD_SC: 20/125
METHOD: SNELLEN - LINEAR
OD_SC+: -1

## 2023-03-30 NOTE — TELEPHONE ENCOUNTER
Patient is scheduled for surgery with Dr. Mick Paul     Spoke with: Tino     Date of Surgery: 04/05     [place] [facility] [place] completed at: [facility] 03/21 in chart (Dr. Paul got approval for 04/05 [facility] from [facility])     Post Op scheduled on 04/06, 04/13, 05/02, and 06/01     Surgery packet was mailed 03/30     Additional comments: Advised RN will call 1 - 3 business days prior with arrival time and instructions. Informed patient they will need an adult  and responsible adult to stay with for 24 hours following surgery

## 2023-03-30 NOTE — PROGRESS NOTES
"HPI: Mr. Gleason was born in 1970 with Hx of keratoconus. right eye corneal transplant in 2010 - surgeon unknown by pt.. Now right eye can not wear CL without corneal edema developing.   Pt started to wear glasses at age 5.  Pt states that left eye has changed yearly.  No old records available.    Interval 3/30/23: Patient reports no significant changes since last visit, left eye vision feels like it may be \"needing a stronger prescription.\" No eye discomfort.  Current meds: Sukhdev 128 QID right eye, prednisolone once daily right eye.     CL: scleral lenses left eye   Can wear CL right eye due to corneal edema developing.   Glasses - none  Meds: Pred right eye daily.  Surgery : right eye PKP 2009 & 2010.    A/P:  #Keratoconus each eye  # History of right eye PKPx2 with failing graft  # S/p right eye attempted DMEK 3/29/23, aborted due to obstructed view from corneal edema (see OP note).  Use Pred, Oflox and ketorolac qid right eye.  Disc surgical option of PKP right eye in 6 days. Disc R, B, PC and options  - pt may continue to try sukhdev 128 qid to see if this helps deturgess cornea.   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  - he mainly wears the contacts at night       PLAN:  - Start pred forte QID right eye.   - Start ofloxacin QID right eye.   - Start ketorolac QID right eye.   - Plan for right eye PKP next week.     RTC:  Will schedule PKP in 6 days - call OR for approval of add on.    Mick Paul MD    Attending Physician Attestation:  Complete documentation of historical and exam elements from today's encounter can be found in the full encounter summary report (not reduplicated in this progress note).  I personally obtained the chief complaint(s) and history of present illness.  I confirmed and edited as necessary the review of systems, past medical/surgical history, family history, social history, and examination findings as documented by " others; and I examined the patient myself.  I personally reviewed the relevant tests, images, and reports as documented above.  I formulated and edited as necessary the assessment and plan and discussed the findings and management plan with the patient and family. - MD Sage De La Garza MD  Ophthalmology, PGY-3

## 2023-03-31 ENCOUNTER — HOSPITAL ENCOUNTER (EMERGENCY)
Facility: CLINIC | Age: 53
Discharge: HOME | End: 2023-03-31
Attending: NURSE PRACTITIONER | Admitting: NURSE PRACTITIONER
Payer: COMMERCIAL

## 2023-03-31 VITALS
DIASTOLIC BLOOD PRESSURE: 98 MMHG | RESPIRATION RATE: 18 BRPM | TEMPERATURE: 98.7 F | OXYGEN SATURATION: 96 % | SYSTOLIC BLOOD PRESSURE: 192 MMHG | HEART RATE: 75 BPM

## 2023-03-31 DIAGNOSIS — K12.2 UVULITIS: ICD-10-CM

## 2023-03-31 LAB
DEPRECATED S PYO AG THROAT QL EIA: NEGATIVE
GROUP A STREP BY PCR: NOT DETECTED

## 2023-03-31 PROCEDURE — 87651 STREP A DNA AMP PROBE: CPT | Performed by: NURSE PRACTITIONER

## 2023-03-31 PROCEDURE — 99203 OFFICE O/P NEW LOW 30 MIN: CPT | Performed by: NURSE PRACTITIONER

## 2023-03-31 PROCEDURE — G0463 HOSPITAL OUTPT CLINIC VISIT: HCPCS | Performed by: NURSE PRACTITIONER

## 2023-03-31 RX ORDER — PREDNISONE 20 MG/1
TABLET ORAL
Qty: 10 TABLET | Refills: 0 | Status: SHIPPED | OUTPATIENT
Start: 2023-03-31 | End: 2023-04-27

## 2023-03-31 ASSESSMENT — ENCOUNTER SYMPTOMS
HEADACHES: 0
FATIGUE: 0
COUGH: 0
TROUBLE SWALLOWING: 0
JOINT SWELLING: 0
ARTHRALGIAS: 0
ABDOMINAL PAIN: 0
SORE THROAT: 1
WEAKNESS: 0
NAUSEA: 0
CHILLS: 0
FEVER: 0
DIZZINESS: 0
NUMBNESS: 0
EYE REDNESS: 0
MYALGIAS: 0
LIGHT-HEADEDNESS: 0
VOMITING: 0
SINUS PRESSURE: 0
EYE DISCHARGE: 0
RHINORRHEA: 0
SHORTNESS OF BREATH: 0
SINUS PAIN: 0

## 2023-03-31 NOTE — ED PROVIDER NOTES
History     Chief Complaint   Patient presents with     Throat Problem     HPI  Tino Gleason is a 52 year old male who presents to the urgent care for evaluation of sore throat and foreign body sensation. Patient reports being intubated two days ago for an eye procedure. Unclear if he had LMA vs ETT. Reports when he clears his throat he feels like he can move something forward and backward. Sore throat with swallowing. Denies fever, headache, dizziness, congestion, sore throat, cough, shortness of breath, chest pain, abdominal pain, nausea, vomiting, diarrhea, urinary symptoms, and rash. Not using OTC medications for symptoms.     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     Priority: Medium     Added automatically from request for surgery [id number]          Past Medical History:    Past Medical History:   Diagnosis Date     Diabetes mellitus, type 2 (H)      HTN (hypertension)      Stable keratoconus of both eyes        Past Surgical History:    Past Surgical History:   Procedure Laterality Date     KERATOPLASTY DESCEMETS MEMBRANE ENDOTHELIAL (DMEK) Right 3/29/2023    Procedure: ATTEMPTED DESCEMET'S MEMBRANE ENDOTHELIAL KERATOPLASTY (DMEK) RIGHT, VIEW OBSCURED, PROCEDURE ABORTED.;  Surgeon: Mick Paul MD;  Location:  OR     [facility] ANESTH,CORNEAL TRANSPLANT         Family History:    Family History   Problem Relation Age of Onset     Glaucoma No family hx of      Macular Degeneration No family hx of        Social History:  Marital Status:   [2]  Social History     Tobacco Use     Smoking status: Never     Smokeless tobacco: Never   Substance Use Topics     Alcohol use:  Yes     Alcohol/week: 10.0 standard drinks     Types: 10 Standard drinks or equivalent per week     Comment: occasional     Drug use: Never        Medications:    predniSONE (DELTASONE) 20 MG tablet  atorvastatin (LIPITOR) 20 MG tablet  cyclobenzaprine (FLEXERIL) 10 MG tablet  erythromycin (ROMYCIN) 5 MG/GM ophthalmic ointment  erythromycin (ROMYCIN) 5 MG/GM ophthalmic ointment  erythromycin (ROMYCIN) 5 MG/GM ophthalmic ointment  fluticasone (FLONASE) 50 MCG/ACT nasal spray  lisinopril (ZESTRIL) 10 MG tablet  metFORMIN (GLUCOPHAGE XR) 500 MG 24 hr tablet  metoprolol succinate ER (TOPROL XL) 50 MG 24 hr tablet  ofloxacin (OCUFLOX) 0.3 % ophthalmic solution  ofloxacin (OCUFLOX) 0.3 % ophthalmic solution  ofloxacin (OCUFLOX) 0.3 % ophthalmic solution  prednisoLONE acetate (PRED FORTE) 1 % ophthalmic suspension  prednisoLONE acetate (PRED FORTE) 1 % ophthalmic suspension  prednisoLONE acetate (PRED FORTE) 1 % ophthalmic suspension  prednisoLONE acetate (PRED FORTE) 1 % ophthalmic suspension  sodium chloride 0.9 % neb solution  tadalafil (CIALIS) 2.5 MG tablet          Review of Systems   Constitutional: Negative for chills, fatigue and fever.   HENT: Positive for sore throat. Negative for congestion, ear discharge, ear pain, rhinorrhea, sinus pressure, sinus pain and trouble swallowing.    Eyes: Negative for discharge and redness.   Respiratory: Negative for cough and shortness of breath.    Cardiovascular: Negative for chest pain.   Gastrointestinal: Negative for abdominal pain, nausea and vomiting.   Musculoskeletal: Negative for arthralgias, joint swelling and myalgias.   Skin: Negative for rash.   Neurological: Negative for dizziness, weakness, light-headedness, numbness and headaches.   All other systems reviewed and are negative.      Physical Exam   BP: (!) 195/100  Pulse: 75  Temp: 98.7  F (37.1  C)  Resp: 18  SpO2: 96 %      Physical Exam  Constitutional:       General: He is not in acute distress.      Appearance: He is well-developed. He is not diaphoretic.   HENT:      Mouth/Throat:      Pharynx: Oropharyngeal exudate and uvula swelling (uvula appears elongated and erythematous) present.   Eyes:      Conjunctiva/sclera: Conjunctivae normal.      Pupils: Pupils are equal, round, and reactive to light.   Cardiovascular:      Rate and Rhythm: Normal rate and regular rhythm.      Pulses: Normal pulses.   Pulmonary:      Effort: Pulmonary effort is normal. No respiratory distress.      Breath sounds: Normal breath sounds and air entry. No decreased air movement. No decreased breath sounds, wheezing or rhonchi.   Abdominal:      General: There is no distension.      Palpations: Abdomen is soft.      Tenderness: There is no abdominal tenderness.   Musculoskeletal:         General: Normal range of motion.      Cervical back: Normal range of motion and neck supple.   Skin:     General: Skin is warm.      Capillary Refill: Capillary refill takes less than 2 seconds.   Neurological:      General: No focal deficit present.      Mental Status: He is alert and oriented to person, place, and time.   Psychiatric:         Mood and Affect: Mood normal.         ED Course                 Procedures      Results for orders placed or performed during the hospital encounter of 03/31/23 (from the past 24 hour(s))   Streptococcus A Rapid Scr w Reflx to PCR    Specimen: Throat; Swab   Result Value Ref Range    Group A Strep antigen Negative Negative       Medications - No data to display    Assessments & Plan (with Medical Decision Making)   Tino Gleason is a 52 year old male who presents to the urgent care for evaluation of sore throat and foreign body sensation. Patient reports being intubated two days ago for an eye procedure. Unclear if he had LMA vs ETT. Reports when he clears his throat he feels like he can move something forward and backward. Sore throat with swallowing. Slightly hypertensive, remaining vitals normal. Will  continue to monitor BP at home. Exam as above. Uvula swollen, elongated and erythematous. Rapid strep negative. Discussed inflammatory vs bacterial vs traumatic uvulitis. At this time patient would like to try conservative management at home. Prednisone rx provided if no relief. Low concern for bacterial cause given recent airway manipulation with procedure 2 days ago. Return precautions reviewed, all questions answered. Patient is agreeable to plan of care and discharged in no acute distress.     I have reviewed the nursing notes.    I have reviewed the findings, diagnosis, plan and need for follow up with the patient.    Discharge Medication List as of 3/31/2023  2:41 PM      START taking these medications    Details   predniSONE (DELTASONE) 20 MG tablet Take two tablets (= 40mg) each day for 5 (five) days, Disp-10 tablet, R-0, E-Prescribe             Final diagnoses:   Uvulitis       3/31/2023   [facility] EMERGENCY DEPT     Robyn Savage, APRN CNP  03/31/23 [id number]

## 2023-03-31 NOTE — ED TRIAGE NOTES
Pt reports that he had surgery on his right eye 3/29/23 and had anesthesia  with a tube down his throat. PT states and he feels like there is something in his throat.

## 2023-04-03 ENCOUNTER — TELEPHONE (OUTPATIENT)
Dept: SURGERY | Facility: CLINIC | Age: 53
End: 2023-04-03
Payer: COMMERCIAL

## 2023-04-03 ENCOUNTER — TELEPHONE (OUTPATIENT)
Dept: SURGERY | Facility: CLINIC | Age: 53
End: 2023-04-03

## 2023-04-03 NOTE — TELEPHONE ENCOUNTER
[facility] Call Center    Phone Message    May a detailed message be left on voicemail: yes     Reason for Call: Other: Gaitrie with [facility] is requesting Pt's Pre Op be faxed to [phone] please as Pt is set to have surgery in 2 days. Please advise. Thank you!     Action Taken: Message routed to:  [facility]    Travel Screening: Not Applicable

## 2023-04-03 NOTE — TELEPHONE ENCOUNTER
Updated Gaitrie at [facility] that [facility] H&P has been faxed, per her request, to  [phone].  Tracy Wells RN

## 2023-04-06 ENCOUNTER — OFFICE VISIT (OUTPATIENT)
Dept: OPHTHALMOLOGY | Facility: CLINIC | Age: 53
End: 2023-04-06
Attending: OPHTHALMOLOGY
Payer: COMMERCIAL

## 2023-04-06 DIAGNOSIS — H18.613 STABLE KERATOCONUS OF BOTH EYES: ICD-10-CM

## 2023-04-06 DIAGNOSIS — Z98.890 POSTOPERATIVE EYE STATE: Primary | ICD-10-CM

## 2023-04-06 DIAGNOSIS — Z86.69 HISTORY OF FAILURE OF CORNEAL GRAFT: ICD-10-CM

## 2023-04-06 PROCEDURE — G0463 HOSPITAL OUTPT CLINIC VISIT: HCPCS | Performed by: OPHTHALMOLOGY

## 2023-04-06 PROCEDURE — 99024 POSTOP FOLLOW-UP VISIT: CPT | Mod: GC | Performed by: OPHTHALMOLOGY

## 2023-04-06 ASSESSMENT — REFRACTION_WEARINGRX
OD_SPHERE: -14.75
OS_AXIS: 035
OD_CYLINDER: +3.00
OD_AXIS: 150
OS_CYLINDER: +2.75
OD_ADD: +2.25
OS_ADD: +2.25
OS_SPHERE: -17.50

## 2023-04-06 ASSESSMENT — PACHYMETRY
OD_CT(UM): 599
OS_CT(UM): 299

## 2023-04-06 ASSESSMENT — VISUAL ACUITY
OS_CC: 20/50
OS_PH_CC: 20/30
METHOD: SNELLEN - LINEAR
OS_PH_CC+: +2
OD_SC: 20/400
OD_PH_SC: 20/100
CORRECTION_TYPE: CONTACTS
OD_PH_SC+: -1

## 2023-04-06 ASSESSMENT — TONOMETRY
IOP_METHOD: ICARE
OS_IOP_MMHG: CTL
OD_IOP_MMHG: 09

## 2023-04-06 ASSESSMENT — SLIT LAMP EXAM - LIDS
COMMENTS: NORMAL
COMMENTS: NORMAL

## 2023-04-06 NOTE — PATIENT INSTRUCTIONS
Right eye meds:    Prednisolon 4 times a day (shake bottle before use)  Ofloxacillin 4 times a day  Erythromycin ointment 4 times a day - after the eye drops (put in last)    - Wear eye shield on Right Eye at all times (except when placing eye medications)  2 lb weight limit. NO bending with head lower than chest.  No water in eye.  No eye rubbing.

## 2023-04-06 NOTE — PROGRESS NOTES
"HPI: Mr. Gleason was born in 1970 with Hx of keratoconus. right eye corneal transplant in 2010 - surgeon unknown by pt.. Now right eye can not wear CL without corneal edema developing.   Pt started to wear glasses at age 5.  Pt states that left eye has changed yearly.  No old records available.    Interval 3/30/23: Patient reports no significant changes since last visit, left eye vision feels like it may be \"needing a stronger prescription.\" No eye discomfort.  Current meds: Jeremy 128 QID right eye, prednisolone once daily right eye.     - 4/6/23: Pt is 1 day Post  right eye PKP. Pt with mild FB sensation but no other complants    CL: scleral lenses left eye   Can wear CL right eye due to corneal edema developing.   Glasses - none  Meds: Pred right eye daily.  Surgery : right eye PKP 2009 & 2010.    A/P:  #Keratoconus each eye  # History of right eye PKPx2 with failing graft  # S/p right eye attempted DMEK 3/29/23, aborted due to obstructed view from corneal edema (see OP note).  - right eye PKP 4/6/23: PKP doing well POD 1. Epi intact. 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  Use Pred, Oflox and emycin qid right eye.      PLAN:  - Start pred forte QID right eye.   - Start ofloxacin QID right eye.   - Start erythromycin qid  Disc right eye PKP precautions and limitations of activity      RTC:  7 days V and Tension - call sooner with any problmes    Mick Paul MD    Attending Physician Attestation:  Complete documentation of historical and exam elements from today's encounter can be found in the full encounter summary report (not reduplicated in this progress note).  I personally obtained the chief complaint(s) and history of present illness.  I confirmed and edited as necessary the review of systems, past medical/surgical history, family history, social history, and examination findings as documented by others; and I examined the patient myself.  I personally reviewed the " relevant tests, images, and reports as documented above.  I formulated and edited as necessary the assessment and plan and discussed the findings and management plan with the patient and family. - MD Sage De La Garza MD  Ophthalmology, PGY-3

## 2023-04-06 NOTE — NURSING NOTE
Chief Complaints and History of Present Illnesses   Patient presents with     Post Op (Ophthalmology) Right Eye     POD#1 s/p PK right eye 04/05/2023     Chief Complaint(s) and History of Present Illness(es)     Post Op (Ophthalmology) Right Eye            Laterality: right eye    Comments: POD#1 s/p PK right eye 04/05/2023          Comments    Pt did not sleep well last night. Soreness and achy feeling in RE that comes and goes, relief with IBU.   Itching under patch RE today. No new flashes or floaters. No redness or dryness.  DM2 BS: 140 yesterday morning.  Lab Results       Component                Value               Date                       A1C                      12.6                02/21/2023              FRANKI Marie April 6, 2023 9:50 AM

## 2023-04-09 ENCOUNTER — OFFICE VISIT (OUTPATIENT)
Dept: URGENT CARE | Facility: URGENT CARE | Age: 53
End: 2023-04-09
Payer: COMMERCIAL

## 2023-04-09 VITALS
TEMPERATURE: 96.9 F | BODY MASS INDEX: 46.3 KG/M2 | WEIGHT: 315 LBS | HEART RATE: 65 BPM | DIASTOLIC BLOOD PRESSURE: 110 MMHG | OXYGEN SATURATION: 96 % | SYSTOLIC BLOOD PRESSURE: 187 MMHG

## 2023-04-09 DIAGNOSIS — I10 PRIMARY HYPERTENSION: ICD-10-CM

## 2023-04-09 DIAGNOSIS — J03.90 TONSILLITIS: Primary | ICD-10-CM

## 2023-04-09 PROCEDURE — 99203 OFFICE O/P NEW LOW 30 MIN: CPT | Performed by: PHYSICIAN ASSISTANT

## 2023-04-09 RX ORDER — AMOXICILLIN 500 MG/1
500 CAPSULE ORAL 2 TIMES DAILY
Qty: 20 CAPSULE | Refills: 0 | Status: SHIPPED | OUTPATIENT
Start: 2023-04-09 | End: 2023-04-09

## 2023-04-09 RX ORDER — AMOXICILLIN 500 MG/1
500 CAPSULE ORAL 2 TIMES DAILY
Qty: 20 CAPSULE | Refills: 0 | Status: SHIPPED | OUTPATIENT
Start: 2023-04-09 | End: 2023-04-27

## 2023-04-09 NOTE — PROGRESS NOTES
Primary hypertension  Blood pressure reviewed with patient. Patient was educated on BP goals and advised to monitor BP at home 2-3 times daily. Low salt diet recommended. Healthy diet and exercise reviewed.  Limit pop and juice intake.  Encourage exercise of at least 20 min per day.  Limit sedentary time to one hour a day. Mediterranean/pescatarian diet best for weight maintenance and cardiovascular health. Follow up with PCP in 1-3 months for any medication adjustments.     Tonsillitis  - phenol (CHLORASEPTIC) 1.4 % spray; Take 1 spray (1 mL) by mouth every hour as needed for sore throat  - amoxicillin (AMOXIL) 500 MG capsule; Take 1 capsule (500 mg) by mouth 2 times daily    Age 12 months or more  Okay to use Zarbee's   Okay to use Rx Children Tylenol if prescribed (Dose based on weight)    Age 2-12:   Okay to use Children Motrin or Tylenol over the counter.    Adults:  Okay to take acetaminophen 500 mg- 2 tabs (Total of 1000 mg) every 8 hrs   Okay to take ibuprofen 200 mg- 3 tabs (Total of 600 mg) every 6 hours        Okay to use Neti pot for sinus lavage up to three times daily for congestion and sinus pressure if present. Daily hot shower can be beneficial for congestion and body aches. Okay to use bedroom vaporizer or humidifier if symptoms are worse at night. Nightly Vicks Vapor rub and 5-10 mg of Melatonin okay to use for sleep.     Over the counter cough medication and decongestants okay if not prescribed by me during this visit. For homeopathic alternatives to cough syrup and decongestant, feel free to try Elderberry extract.    Okay to use salt water gargles, warm tea (or warm water with lemon and honey), and lozenges for any throat discomfort. Chloraseptic spray is also highly encourages for throat pain/irritation.     Patient will need to get plenty of rest and drink at least 1.5-2 liters of fluids daily for adults and 1-1.5 liters for children. If vomiting and not tolerating liquids for more than 24  hrs, please go to your nearest emergency department for IV fluids and further treatment.     Patient is not contagious after 1 week from start of symptoms. If possible, wear mask for first 7 days. Wash hands regularly and vigorously for 30 seconds often.     [facility]    Subjective   52 year old who presents to clinic today for the following health issues:    Gland       HPI     Acute Illness  Acute illness concerns: Throat pain, swollen glands   Onset/Duration: Wednesday   Symptoms:  Fever: No  Chills/Sweats: No  Headache (location?): No  Sinus Pressure: No  Conjunctivitis:  No  Ear Pain: YES: right  Rhinorrhea: No  Congestion: No  Sore Throat: YES  Cough: no  Wheeze: No  Decreased Appetite: No  Nausea: No  Vomiting: No  Diarrhea: No  Dysuria/Freq.: No  Dysuria or Hematuria: No  Fatigue/Achiness: No  Sick/Strep Exposure: No  Therapies tried and outcome: Tylenol and ibuprofen     Patient had a corneal transplant on Wednesday.     Review of Systems   Review of Systems   See HPI    Objective    Temp: 96.9  F (36.1  C) Temp src: Tympanic BP: (!) 187/110 Pulse: 65     SpO2: 96 %       Physical Exam   Physical Exam  Constitutional:       General: He is not in acute distress.     Appearance: Normal appearance. He is not ill-appearing, toxic-appearing or diaphoretic.   HENT:      Head: Normocephalic and atraumatic.      Right Ear: Tympanic membrane, ear canal and external ear normal. There is no impacted cerumen.      Left Ear: Tympanic membrane, ear canal and external ear normal. There is no impacted cerumen.      Nose: Nose normal. No congestion or rhinorrhea.      Mouth/Throat:      Mouth: Mucous membranes are moist.      Pharynx: Oropharyngeal exudate and posterior oropharyngeal erythema present.   Cardiovascular:      Rate and Rhythm: Normal rate and regular rhythm.      Pulses: Normal pulses.      Heart sounds: Normal heart sounds. No murmur heard.     No friction rub. No  gallop.   Pulmonary:      Effort: Pulmonary effort is normal. No respiratory distress.      Breath sounds: Normal breath sounds. No stridor. No wheezing, rhonchi or rales.   Chest:      Chest wall: No tenderness.   Lymphadenopathy:      Cervical: Cervical adenopathy present.   Neurological:      Mental Status: He is alert.   Psychiatric:         Mood and Affect: Mood normal.         Behavior: Behavior normal.         Thought Content: Thought content normal.         Judgment: Judgment normal.          No results found for this or any previous visit (from the past 24 hour(s)).

## 2023-04-11 ENCOUNTER — TELEPHONE (OUTPATIENT)
Dept: OPHTHALMOLOGY | Facility: CLINIC | Age: 53
End: 2023-04-11

## 2023-04-11 NOTE — TELEPHONE ENCOUNTER
[facility] Call Center    Phone Message    May a detailed message be left on voicemail: yes     Reason for Call: Form or Letter   Type or form/letter needing completion: Work restrictions     Pt says that his work has not received a letter yet for his work restrictions. He says that the fax number should be on the paperwork that is already at the clinic    Provider: Paul  Date form needed: asap  Once completed: Fax form to: pt employer      Action Taken: Message routed to:  [facility]    Travel Screening: Not Applicable

## 2023-04-13 ENCOUNTER — OFFICE VISIT (OUTPATIENT)
Dept: OPHTHALMOLOGY | Facility: CLINIC | Age: 53
End: 2023-04-13
Attending: OPHTHALMOLOGY
Payer: COMMERCIAL

## 2023-04-13 DIAGNOSIS — H18.613 STABLE KERATOCONUS OF BOTH EYES: Primary | ICD-10-CM

## 2023-04-13 DIAGNOSIS — Z98.890 POSTOPERATIVE EYE STATE: ICD-10-CM

## 2023-04-13 DIAGNOSIS — Z86.69 HISTORY OF FAILURE OF CORNEAL GRAFT: ICD-10-CM

## 2023-04-13 PROCEDURE — 99024 POSTOP FOLLOW-UP VISIT: CPT | Mod: GC | Performed by: OPHTHALMOLOGY

## 2023-04-13 RX ORDER — ERYTHROMYCIN 5 MG/G
0.5 OINTMENT OPHTHALMIC 4 TIMES DAILY
Qty: 7 G | Refills: 4 | Status: SHIPPED | OUTPATIENT
Start: 2023-04-13 | End: 2023-06-01

## 2023-04-13 RX ORDER — PREDNISOLONE ACETATE 10 MG/ML
1 SUSPENSION/ DROPS OPHTHALMIC 4 TIMES DAILY
Qty: 10 ML | Refills: 11 | Status: SHIPPED | OUTPATIENT
Start: 2023-04-13 | End: 2023-06-01

## 2023-04-13 ASSESSMENT — SLIT LAMP EXAM - LIDS
COMMENTS: NORMAL
COMMENTS: NORMAL

## 2023-04-13 ASSESSMENT — VISUAL ACUITY
METHOD: SNELLEN - LINEAR
OS_CC: 20/40
OD_PH_SC: 20/70
OD_SC: 20/200

## 2023-04-13 ASSESSMENT — TONOMETRY
OS_IOP_MMHG: SCLERAL LENS
OD_IOP_MMHG: 8
IOP_METHOD: TONOPEN

## 2023-04-13 NOTE — PROGRESS NOTES
HPI: Mr. Gleason was born in 1970 with Hx of keratoconus. right eye corneal transplant in 2010 - surgeon unknown by pt.. Now right eye can not wear CL without corneal edema developing.   Pt started to wear glasses at age 5.  Pt states that left eye has changed yearly.  No old records available.    Interval 4/13/23: POW1 S/P right eye PKP. Doing well, mild redness of the right eye but feels well without pain. Feels that vision is slowly improving. No flashes, floaters, curtains.     CL: scleral lenses left eye   Can wear CL right eye due to corneal edema developing.   Glasses - none  Meds:    - Prednisolone QID right eye   - Ofloxacin QID right eye   - Erythromycin ointment QID OD  Surgery : right eye PKP 2009 & 2010.    A/P:  #Keratoconus each eye  # History of right eye PKPx2 with failing graft  # S/p right eye attempted DMEK 3/29/23, aborted due to obstructed view from corneal edema (see OP note).  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 Disc possible need for pupiloplasty, disc r, b and PC for future surgery    PLAN:  - continue pred forte QID right eye.   - continue ofloxacin QID right eye.   - continue erythromycin qid  - No lifting over 2lbs, no bending/straining; no water in the eye  - Eye shield at night  - RD/endophthalmitis precautions discussed     RTC:  POM1 VT- call sooner with any problmes    Dragan Hsieh MD  Fellow, Cornea, External Disease and Refractive Surgery  Department of Ophthalmology  [facility]      Attending Physician Attestation:  Complete documentation of historical and exam elements from today's encounter can be found in the full encounter summary report (not reduplicated in this progress note).  I personally obtained the chief complaint(s) and history of present illness.  I confirmed and edited as necessary the review of systems, past medical/surgical history, family history,  social history, and examination findings as documented by others; and I examined the patient myself.  I personally reviewed the relevant tests, images, and reports as documented above.  I formulated and edited as necessary the assessment and plan and discussed the findings and management plan with the patient and family. - Mick Paul MD

## 2023-04-27 ENCOUNTER — OFFICE VISIT (OUTPATIENT)
Dept: FAMILY MEDICINE | Facility: CLINIC | Age: 53
End: 2023-04-27
Payer: COMMERCIAL

## 2023-04-27 VITALS
HEART RATE: 83 BPM | OXYGEN SATURATION: 97 % | RESPIRATION RATE: 18 BRPM | TEMPERATURE: 97.2 F | DIASTOLIC BLOOD PRESSURE: 100 MMHG | BODY MASS INDEX: 45.1 KG/M2 | HEIGHT: 70 IN | WEIGHT: 315 LBS | SYSTOLIC BLOOD PRESSURE: 160 MMHG

## 2023-04-27 DIAGNOSIS — E11.65 TYPE 2 DIABETES MELLITUS WITH HYPERGLYCEMIA, WITHOUT LONG-TERM CURRENT USE OF INSULIN (H): ICD-10-CM

## 2023-04-27 DIAGNOSIS — E78.5 HYPERLIPIDEMIA LDL GOAL <70: ICD-10-CM

## 2023-04-27 DIAGNOSIS — Z12.11 SCREEN FOR COLON CANCER: ICD-10-CM

## 2023-04-27 DIAGNOSIS — K59.00 CONSTIPATION, UNSPECIFIED CONSTIPATION TYPE: ICD-10-CM

## 2023-04-27 DIAGNOSIS — N52.1 ERECTILE DYSFUNCTION DUE TO DISEASES CLASSIFIED ELSEWHERE: ICD-10-CM

## 2023-04-27 DIAGNOSIS — E11.65 TYPE 2 DIABETES MELLITUS WITH HYPERGLYCEMIA, WITHOUT LONG-TERM CURRENT USE OF INSULIN (H): Primary | ICD-10-CM

## 2023-04-27 DIAGNOSIS — M10.9 GOUT OF ANKLE, UNSPECIFIED CAUSE, UNSPECIFIED CHRONICITY, UNSPECIFIED LATERALITY: ICD-10-CM

## 2023-04-27 DIAGNOSIS — Z00.00 ROUTINE GENERAL MEDICAL EXAMINATION AT A HEALTH CARE FACILITY: Primary | ICD-10-CM

## 2023-04-27 DIAGNOSIS — I10 ESSENTIAL HYPERTENSION: ICD-10-CM

## 2023-04-27 DIAGNOSIS — Z23 ENCOUNTER FOR IMMUNIZATION: ICD-10-CM

## 2023-04-27 LAB
ALBUMIN SERPL BCG-MCNC: 4.2 G/DL (ref 3.5–5.2)
ALP SERPL-CCNC: 119 U/L (ref 40–129)
ALT SERPL W P-5'-P-CCNC: 57 U/L (ref 10–50)
ANION GAP SERPL CALCULATED.3IONS-SCNC: 11 MMOL/L (ref 7–15)
AST SERPL W P-5'-P-CCNC: 20 U/L (ref 10–50)
BILIRUB SERPL-MCNC: 0.3 MG/DL
BUN SERPL-MCNC: 15.5 MG/DL (ref 6–20)
CALCIUM SERPL-MCNC: 9.9 MG/DL (ref 8.6–10)
CHLORIDE SERPL-SCNC: 102 MMOL/L (ref 98–107)
CHOLEST SERPL-MCNC: 196 MG/DL
CREAT SERPL-MCNC: 1.08 MG/DL (ref 0.67–1.17)
CREAT UR-MCNC: 322.6 MG/DL
DEPRECATED HCO3 PLAS-SCNC: 25 MMOL/L (ref 22–29)
GFR SERPL CREATININE-BSD FRML MDRD: 83 ML/MIN/1.73M2
GLUCOSE SERPL-MCNC: 258 MG/DL (ref 70–99)
HBA1C MFR BLD: 9.8 % (ref 0–5.6)
HDLC SERPL-MCNC: 28 MG/DL
LDLC SERPL CALC-MCNC: 125 MG/DL
MICROALBUMIN UR-MCNC: 17.4 MG/L
MICROALBUMIN/CREAT UR: 5.39 MG/G CR (ref 0–17)
NONHDLC SERPL-MCNC: 168 MG/DL
POTASSIUM SERPL-SCNC: 4.2 MMOL/L (ref 3.4–5.3)
PROT SERPL-MCNC: 8.2 G/DL (ref 6.4–8.3)
SODIUM SERPL-SCNC: 138 MMOL/L (ref 136–145)
TRIGL SERPL-MCNC: 217 MG/DL

## 2023-04-27 PROCEDURE — 80053 COMPREHEN METABOLIC PANEL: CPT | Performed by: NURSE PRACTITIONER

## 2023-04-27 PROCEDURE — 80061 LIPID PANEL: CPT | Performed by: NURSE PRACTITIONER

## 2023-04-27 PROCEDURE — 82043 UR ALBUMIN QUANTITATIVE: CPT | Performed by: NURSE PRACTITIONER

## 2023-04-27 PROCEDURE — 36415 COLL VENOUS BLD VENIPUNCTURE: CPT | Performed by: NURSE PRACTITIONER

## 2023-04-27 PROCEDURE — 99214 OFFICE O/P EST MOD 30 MIN: CPT | Mod: 25 | Performed by: NURSE PRACTITIONER

## 2023-04-27 PROCEDURE — 82570 ASSAY OF URINE CREATININE: CPT | Performed by: NURSE PRACTITIONER

## 2023-04-27 PROCEDURE — 90715 TDAP VACCINE 7 YRS/> IM: CPT | Performed by: NURSE PRACTITIONER

## 2023-04-27 PROCEDURE — 99396 PREV VISIT EST AGE 40-64: CPT | Mod: 25 | Performed by: NURSE PRACTITIONER

## 2023-04-27 PROCEDURE — 83036 HEMOGLOBIN GLYCOSYLATED A1C: CPT | Performed by: NURSE PRACTITIONER

## 2023-04-27 PROCEDURE — 90471 IMMUNIZATION ADMIN: CPT | Performed by: NURSE PRACTITIONER

## 2023-04-27 PROCEDURE — 90472 IMMUNIZATION ADMIN EACH ADD: CPT | Performed by: NURSE PRACTITIONER

## 2023-04-27 PROCEDURE — 90677 PCV20 VACCINE IM: CPT | Performed by: NURSE PRACTITIONER

## 2023-04-27 RX ORDER — ATORVASTATIN CALCIUM 40 MG/1
40 TABLET, FILM COATED ORAL DAILY
Qty: 90 TABLET | Refills: 3 | Status: SHIPPED | OUTPATIENT
Start: 2023-04-27 | End: 2024-05-13

## 2023-04-27 RX ORDER — METOPROLOL SUCCINATE 50 MG/1
50 TABLET, EXTENDED RELEASE ORAL 2 TIMES DAILY
Qty: 180 TABLET | Refills: 3 | Status: SHIPPED | OUTPATIENT
Start: 2023-04-27 | End: 2024-05-13

## 2023-04-27 RX ORDER — AMLODIPINE BESYLATE 5 MG/1
5 TABLET ORAL DAILY
Qty: 90 TABLET | Refills: 3 | Status: SHIPPED | OUTPATIENT
Start: 2023-04-27 | End: 2023-05-04 | Stop reason: DRUGHIGH

## 2023-04-27 RX ORDER — GLUCOSAMINE HCL/CHONDROITIN SU 500-400 MG
CAPSULE ORAL
Qty: 100 EACH | Refills: 3 | Status: SHIPPED | OUTPATIENT
Start: 2023-04-27

## 2023-04-27 RX ORDER — LANCETS
EACH MISCELLANEOUS
Qty: 100 EACH | Refills: 6 | Status: SHIPPED | OUTPATIENT
Start: 2023-04-27

## 2023-04-27 RX ORDER — TADALAFIL 2.5 MG/1
10 TABLET ORAL DAILY PRN
Qty: 20 TABLET | Refills: 3 | Status: SHIPPED | OUTPATIENT
Start: 2023-04-27 | End: 2024-08-22

## 2023-04-27 RX ORDER — ATORVASTATIN CALCIUM 20 MG/1
20 TABLET, FILM COATED ORAL EVERY MORNING
Qty: 90 TABLET | Refills: 3 | Status: SHIPPED | OUTPATIENT
Start: 2023-04-27 | End: 2023-04-27 | Stop reason: DRUGHIGH

## 2023-04-27 ASSESSMENT — ENCOUNTER SYMPTOMS
DYSURIA: 0
NERVOUS/ANXIOUS: 0
CHILLS: 0
HEMATURIA: 0
COUGH: 0
PARESTHESIAS: 0
ABDOMINAL PAIN: 1
HEMATOCHEZIA: 0
HEADACHES: 0
EYE PAIN: 1
DIZZINESS: 0
ARTHRALGIAS: 0
PALPITATIONS: 0
NAUSEA: 0
JOINT SWELLING: 0
CONSTIPATION: 1
SHORTNESS OF BREATH: 0
DIARRHEA: 0
FREQUENCY: 0
FEVER: 0
MYALGIAS: 0
WEAKNESS: 0
SORE THROAT: 0
HEARTBURN: 0

## 2023-04-27 NOTE — PROGRESS NOTES
SUBJECTIVE:   CC: Tino is an 52 year old who presents for preventative health visit.       4/27/2023     7:46 AM   Additional Questions   Roomed by berkley landis   Accompanied by self         4/27/2023     7:46 AM   Patient Reported Additional Medications   Patient reports taking the following new medications none   Patient has been advised of split billing requirements and indicates understanding: Yes  Healthy Habits:    Getting at least 3 servings of Calcium per day:  Yes    Bi-annual eye exam:  Yes    Dental care twice a year:  Yes    Sleep apnea or symptoms of sleep apnea:  Sleep apnea and Excessive snoring    Diet:  Regular (no restrictions)    Frequency of exercise:  1 day/week    Duration of exercise:  15-30 minutes    Taking medications regularly:  Yes    Barriers to taking medications:  Not applicable    Medication side effects:  Not applicable    PHQ-2 Total Score:    Additional concerns today:  Yes (stomach issues )    Today's PHQ-2 Score:       4/27/2023     7:52 AM   PHQ-2 ( 1999 [employer])   Q1: Little interest or pleasure in doing things 0   Q2: Feeling down, depressed or hopeless 0   PHQ-2 Score 0   Q1: Little interest or pleasure in doing things Not at all   Q2: Feeling down, depressed or hopeless Not at all   PHQ-2 Score 0     Have you ever done Advance Care Planning? (For example, a Health Directive, POLST, or a discussion with a medical provider or your loved ones about your wishes): No, advance care planning information given to patient to review.  Patient plans to discuss their wishes with loved ones or provider.      Social History     Tobacco Use     Smoking status: Never     Smokeless tobacco: Never   Vaping Use     Vaping status: Never Used   Substance Use Topics     Alcohol use: Yes     Alcohol/week: 10.0 standard drinks of alcohol     Types: 10 Standard drinks or equivalent per week     Comment: occasional           4/27/2023     7:52 AM   Alcohol Use   Prescreen: >3 drinks/day or >7 drinks/week?  No       Last PSA: No results found for: PSA    Reviewed orders with patient. 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     Postoperative eye state     Essential hypertension     Type 2 diabetes mellitus with hyperglycemia, without long-term current use of insulin (H)     Gout     Past Surgical History:   Procedure Laterality Date     KERATOPLASTY DESCEMETS MEMBRANE ENDOTHELIAL (DMEK) Right 3/29/2023    Procedure: ATTEMPTED DESCEMET'S MEMBRANE ENDOTHELIAL KERATOPLASTY (DMEK) RIGHT, VIEW OBSCURED, PROCEDURE ABORTED.;  Surgeon: Mick Paul MD;  Location: [facility]     PA ANESTH,CORNEAL TRANSPLANT         Social History     Tobacco Use     Smoking status: Never     Smokeless tobacco: Never   Vaping Use     Vaping status: Never Used   Substance Use Topics     Alcohol use: Yes     Alcohol/week: 10.0 standard drinks of alcohol     Types: 10 Standard drinks or equivalent per week     Comment: occasional     Family History   Problem Relation Age of Onset     Hypertension Mother      Hyperlipidemia Mother      Hypertension Brother      Hypertension Brother      Heart Failure Maternal Grandmother      Diabetes Maternal Grandfather      Liver Cancer Paternal Grandmother      Glaucoma No family hx of      Macular Degeneration No family hx of          Current Outpatient Medications   Medication Sig Dispense Refill     alcohol swab prep pads Use to swab area of injection/finn as directed. 100 each 3     amLODIPine (NORVASC) 5 MG tablet Take 1 tablet (5 mg) by mouth daily 90 tablet 3     atorvastatin (LIPITOR)  20 MG tablet Take 1 tablet (20 mg) by mouth every morning 90 tablet 3     blood glucose (NO BRAND SPECIFIED) test strip Use to test blood sugar 3-4 times daily or as directed. To accompany: Blood Glucose Monitor Brands: per insurance. 100 strip 6     blood glucose monitoring (NO BRAND SPECIFIED) meter device kit Use to test blood sugar 3-4 times daily or as directed. Preferred blood glucose meter OR supplies to accompany: Blood Glucose Monitor Brands: per insurance. 1 kit 0     erythromycin (ROMYCIN) 5 MG/GM ophthalmic ointment Place 0.5 inches into the right eye 4 times daily 7 g 4     fluticasone (FLONASE) 50 MCG/ACT nasal spray as needed       metFORMIN (GLUCOPHAGE XR) 500 MG 24 hr tablet Take 500 mg by mouth 2 times daily (with meals)       metoprolol succinate ER (TOPROL XL) 50 MG 24 hr tablet Take 1 tablet (50 mg) by mouth 2 times daily 180 tablet 3     ofloxacin (OCUFLOX) 0.3 % ophthalmic solution Place 1 drop into the right eye 4 times daily 5 mL 0     prednisoLONE acetate (PRED FORTE) 1 % ophthalmic suspension Place 1 drop into the right eye 4 times daily 10 mL 11     tadalafil (CIALIS) 2.5 MG tablet Take 4 tablets (10 mg) by mouth daily as needed (sexual intercourse) 20 tablet 3     thin (NO BRAND SPECIFIED) lancets Use to test blood sugar 3-4 times daily or as directed. To accompany: Blood Glucose Monitor Brands: per insurance. 100 each 6     lisinopril (ZESTRIL) 10 MG tablet Take 10 mg by mouth every morning (Patient not taking: Reported on 4/27/2023)       phenol (CHLORASEPTIC) 1.4 % spray Take 1 spray (1 mL) by mouth every hour as needed for sore throat (Patient not taking: Reported on 4/27/2023) 296 mL 0     sodium chloride 0.9 % neb solution 5 mLs by Other route 2 times daily (Patient not taking: Reported on 4/27/2023) 500 mL 11     Allergies   Allergen Reactions     Hydrochlorothiazide      Gout flares     Hydrocodone-Acetaminophen Hives and Swelling     Naproxen GI Disturbance     Other  reaction(s): GI Upset     Recent Labs   Lab Test 04/27/23  0848 03/21/23  1004 02/21/23  1009 04/27/22  1122   A1C 9.8*  --  12.6*  --    *  --   --  112   HDL 28*  --   --  34*   TRIG 217*  --   --  123   ALT 57*  --   --  34   CR 1.08 0.90  --  1.02   GFRESTIMATED 83 >90  --  90   POTASSIUM 4.2 4.2  --  4.1        Reviewed and updated as needed this visit by clinical staff   Tobacco  Allergies  Meds  Problems  Med Hx  Surg Hx  Fam Hx  Soc   Hx        Reviewed and updated as needed this visit by Provider   Tobacco  Allergies  Meds  Problems  Med Hx  Surg Hx  Fam Hx  Soc   Hx       Past Medical History:   Diagnosis Date     Diabetes mellitus, type 2 (H)      HTN (hypertension)      Hyperlipidemia LDL goal <70      Stable keratoconus of both eyes       Past Surgical History:   Procedure Laterality Date     KERATOPLASTY DESCEMETS MEMBRANE ENDOTHELIAL (DMEK) Right 3/29/2023    Procedure: ATTEMPTED DESCEMET'S MEMBRANE ENDOTHELIAL KERATOPLASTY (DMEK) RIGHT, VIEW OBSCURED, PROCEDURE ABORTED.;  Surgeon: Mick Paul MD;  Location: [facility]     [place] ANESTH,CORNEAL TRANSPLANT         Review of Systems   Constitutional: Negative for chills and fever.   HENT: Negative for congestion, ear pain, hearing loss and sore throat.    Eyes: Positive for pain and visual disturbance.   Respiratory: Negative for cough and shortness of breath.    Cardiovascular: Negative for chest pain, palpitations and peripheral edema.   Gastrointestinal: Positive for abdominal pain and constipation. Negative for diarrhea, heartburn, hematochezia and nausea.   Genitourinary: Positive for impotence. Negative for dysuria, frequency, genital sores, hematuria, penile discharge and urgency.   Musculoskeletal: Negative for arthralgias, joint swelling and myalgias.   Skin: Negative for rash.   Neurological: Negative for dizziness, weakness, headaches and paresthesias.   Psychiatric/Behavioral: Negative for mood changes. The patient  "is not nervous/anxious.      Having constipation and abdominal pain associated with this.  He is drinking fluid and adding metamucil but not daily.      Patient has been on Cialis in the past and would like a refill.    Patient follows with opthalmology for eye issues    OBJECTIVE:   BP (!) 160/100   Pulse 83   Temp 97.2  F (36.2  C) (Tympanic)   Resp 18   Ht 1.79 m (5' 10.47\")   Wt 143.3 kg (316 lb)   SpO2 97%   BMI 44.74 kg/m      Physical Exam  GENERAL: healthy, alert and no distress  EYES: dilated right pupil with hx of surgical corneal transplant surgery recently  HENT: ear canals and TM's normal, nose and mouth without ulcers or lesions  NECK: no adenopathy, no asymmetry, masses, or scars and thyroid normal to palpation  RESP: lungs clear to auscultation - no rales, rhonchi or wheezes  CV: regular rate and rhythm, normal S1 S2, no S3 or S4, no murmur, click or rub, no peripheral edema and peripheral pulses strong  ABDOMEN: soft, nontender, no hepatosplenomegaly, no masses and bowel sounds normal  MS: no gross musculoskeletal defects noted, no edema  SKIN: no suspicious lesions or rashes  NEURO: Normal strength and tone, mentation intact and speech normal  PSYCH: mentation appears normal, affect normal/bright  LYMPH: normal ant/post cervical, supraclavicular nodes    Diagnostic Test Results:  Labs reviewed in Epic    ASSESSMENT/PLAN:   (Z00.00) Routine general medical examination at a health care facility  (primary encounter diagnosis)  Comment: Ordered Colonoscopy for screening.  Labs ordered for diabetes, cholesterol, and kidney/liver function/electrolytes.  Other clinical history addressed below. Exam is normal except right eye.  Tdap and Pneumonia vaccination given today. Handout given on his preventative care and follow-up recommended in 1 year for preventative exam.    (Z12.11) Screen for colon cancer  Plan: Colonoscopy Screening  Referral    (Z23) Encounter for immunization  Plan: TDAP " VACCINE (Adacel, Boostrix), PNEUMOCOCCAL         20 VALENT CONJUGATE (PREVNAR 20)    (E11.65) Type 2 diabetes mellitus with hyperglycemia, without long-term current use of insulin (H)  Comment: Labs ordered.  Foot exam is normal today.  Patient has recently had eye exam in the [facility] with his opthalmology with his right eye issues.  Diabetic supplies given today.  Recommended checking blood sugars in the morning fasting, before lunch and 2 hours after dinner to get an idea of his levels.  Referral placed to diabetic education since he has not ever met with anyone.  For now will await A1C and have him continue his metformin 1000 mg twice daily.  Plan: Albumin Random Urine Quantitative with Creat         Ratio, Lipid panel reflex to direct LDL         Fasting, blood glucose monitoring (NO BRAND         SPECIFIED) meter device kit, thin (NO BRAND         SPECIFIED) lancets, alcohol swab prep pads,         blood glucose (NO BRAND SPECIFIED) test strip,         Hemoglobin A1c, AMB Adult Diabetes Educator         Referral, Comprehensive metabolic panel (BMP +         Alb, Alk Phos, ALT, AST, Total. Bili, TP)    (I10) Essential hypertension  Comment: Lisinopril is not indicated in  men since it is not helpful in bringing blood pressure down.  Stopping this medication and starting amlodipine 5 mg daily with continued Metoprolol of 50 mg twice daily.  Patient will make nursing appointment in 1 week for recheck in clinic.  We will follow-up in 1 month with lab for recheck on kidney function and electrolytes if BP is still elevated can increase amlodipine.  Plan: metoprolol succinate ER (TOPROL XL) 50 MG 24 hr        tablet, amLODIPine (NORVASC) 5 MG tablet,         Comprehensive metabolic panel (BMP + Alb, Alk         Phos, ALT, AST, Total. Bili, TP)    (M10.9) Gout of ankle, unspecified cause, unspecified chronicity, unspecified laterality  Comment: hydrochlorothiazide added to his allergy list as side effect  "since it causes him gout flares and gout added to problem list.    (E78.5) Hyperlipidemia LDL goal <70  Comment: Lab ordered for evaluation.  Refilled Lipitor at 20 mg dose for 1 year.  Plan: atorvastatin (LIPITOR) 20 MG tablet    (N52.1) Erectile dysfunction due to diseases classified elsewhere  Comment: Refilled CIalis for as needed use.  Plan: tadalafil (CIALIS) 2.5 MG tablet    (K59.00) Constipation, unspecified constipation type  Comment: Advised daily fiber/fluids, stool softener once or twice daily and adding in miralax every couple days up to no more than 4 times daily to get bowels moving normally.  Follow-up as needed.    Patient has been advised of split billing requirements and indicates understanding: Yes      COUNSELING:   Reviewed preventive health counseling, as reflected in patient instructions       Regular exercise       Healthy diet/nutrition       Vision screening       Immunizations    Vaccinated for: Pneumococcal and TDAP         Colorectal cancer screening       Osteoporosis prevention/bone health       Advance Care Planning    BMI:   Estimated body mass index is 44.74 kg/m  as calculated from the following:    Height as of this encounter: 1.79 m (5' 10.47\").    Weight as of this encounter: 143.3 kg (316 lb).   Weight management plan: Discussed healthy diet and exercise guidelines    He reports that he has never smoked. He has never used smokeless tobacco.    Thea Olvera NP  [facility]  "

## 2023-04-27 NOTE — PROGRESS NOTES
Prior to immunization administration, verified patients identity using patient s name and date of birth. Please see Immunization Activity for additional information.     Screening Questionnaire for Adult Immunization    Are you sick today?   No   Do you have allergies to medications, food, a vaccine component or latex?   No   Have you ever had a serious reaction after receiving a vaccination?   No   Do you have a long-term health problem with heart, lung, kidney, or metabolic disease (e.g., diabetes), asthma, a blood disorder, no spleen, complement component deficiency, a cochlear implant, or a spinal fluid leak?  Are you on long-term aspirin therapy?   YES diabetes    Do you have cancer, leukemia, HIV/AIDS, or any other immune system problem?   No   Do you have a parent, brother, or sister with an immune system problem?   No   In the past 3 months, have you taken medications that affect  your immune system, such as prednisone, other steroids, or anticancer drugs; drugs for the treatment of rheumatoid arthritis, Crohn s disease, or psoriasis; or have you had radiation treatments?   No   Have you had a seizure, or a brain or other nervous system problem?   No   During the past year, have you received a transfusion of blood or blood    products, or been given immune (gamma) globulin or antiviral drug?   No   For women: Are you pregnant or is there a chance you could become       pregnant during the next month?   No   Have you received any vaccinations in the past 4 weeks?   No     Immunization questionnaire answers were all negative.      Patient instructed to remain in clinic for 15 minutes afterwards, and to report any adverse reactions.     Screening performed by Grady Vizcarra on 4/27/2023 at 8:36 AM.

## 2023-04-27 NOTE — PATIENT INSTRUCTIONS
Start medications and recheck BP in 1 week with nursing visit.  Immunizations today.  3.   Stop lisinopril and start Amlodipine.  4.  Make appointment with diabetic education for education on diabetes and blood sugar monitoring.  5. Check blood sugars in the morning, before lunch and 2 hours after dinner or biggest meal of the day.  6. Continue fluids and fiber daily,  add Colace 100 mg (stool softener) once or twice daily, and if still having constipation can add in Miralax once every 3 days to up to no more than 4 times a day.  7.  Refill given on cialis.    Preventive Health Recommendations  Male Ages 50 - 64    Yearly exam:             See your health care provider every year in order to  o   Review health changes.   o   Discuss preventive care.    o   Review your medicines if your doctor has prescribed any.   Have a cholesterol test every 5 years, or more frequently if you are at risk for high cholesterol/heart disease.   Have a diabetes test (fasting glucose) every three years. If you are at risk for diabetes, you should have this test more often.   Have a colonoscopy at age 50, or have a yearly FIT test (stool test). These exams will check for colon cancer.    Talk with your health care provider about whether or not a prostate cancer screening test (PSA) is right for you.  You should be tested each year for STDs (sexually transmitted diseases), if you re at risk.     Shots: Get a flu shot each year. Get a tetanus shot every 10 years.     Nutrition:  Eat at least 5 servings of fruits and vegetables daily.   Eat whole-grain bread, whole-wheat pasta and brown rice instead of white grains and rice.   Get adequate Calcium and Vitamin D.     Lifestyle  Exercise for at least 150 minutes a week (30 minutes a day, 5 days a week). This will help you control your weight and prevent disease.   Limit alcohol to one drink per day.   No smoking.   Wear sunscreen to prevent skin cancer.   See your dentist every six months  for an exam and cleaning.   See your eye doctor every 1 to 2 years.

## 2023-05-02 ENCOUNTER — OFFICE VISIT (OUTPATIENT)
Dept: OPHTHALMOLOGY | Facility: CLINIC | Age: 53
End: 2023-05-02
Attending: OPHTHALMOLOGY
Payer: COMMERCIAL

## 2023-05-02 DIAGNOSIS — Z98.890 POSTOPERATIVE EYE STATE: Primary | ICD-10-CM

## 2023-05-02 DIAGNOSIS — H52.213 IRREGULAR ASTIGMATISM OF BOTH EYES: ICD-10-CM

## 2023-05-02 PROCEDURE — 99024 POSTOP FOLLOW-UP VISIT: CPT | Performed by: OPHTHALMOLOGY

## 2023-05-02 PROCEDURE — G0463 HOSPITAL OUTPT CLINIC VISIT: HCPCS | Performed by: OPHTHALMOLOGY

## 2023-05-02 ASSESSMENT — CONF VISUAL FIELD
METHOD: COUNTING FINGERS
OD_NORMAL: 1
OD_INFERIOR_TEMPORAL_RESTRICTION: 0
OS_SUPERIOR_TEMPORAL_RESTRICTION: 0
OD_SUPERIOR_NASAL_RESTRICTION: 0
OS_INFERIOR_TEMPORAL_RESTRICTION: 0
OD_INFERIOR_NASAL_RESTRICTION: 0
OS_NORMAL: 1
OD_SUPERIOR_TEMPORAL_RESTRICTION: 0
OS_SUPERIOR_NASAL_RESTRICTION: 0
OS_INFERIOR_NASAL_RESTRICTION: 0

## 2023-05-02 ASSESSMENT — PACHYMETRY
OS_CT(UM): 299
OD_CT(UM): 599

## 2023-05-02 ASSESSMENT — SLIT LAMP EXAM - LIDS
COMMENTS: NORMAL
COMMENTS: NORMAL

## 2023-05-02 ASSESSMENT — TONOMETRY
OD_IOP_MMHG: 12
IOP_METHOD: ICARE
OS_IOP_MMHG: SCL

## 2023-05-02 ASSESSMENT — VISUAL ACUITY
OS_SC: 20/40
METHOD: SNELLEN - LINEAR
OS_PH_SC: 20/25
OD_SC: 20/100
CORRECTION_TYPE: CONTACTS
OS_PH_SC+: -2

## 2023-05-02 NOTE — PROGRESS NOTES
HPI: Mr. Gleason was born in 1970 with Hx of keratoconus. right eye corneal transplant in 2010 - surgeon unknown by pt.. Now right eye can not wear CL without corneal edema developing.   Pt started to wear glasses at age 5.  Pt states that left eye has changed yearly.  No old records available.    Interval 4/13/23: POW1 S/P right eye PKP. Doing well, mild redness of the right eye but feels well without pain. Feels that vision is slowly improving. No flashes, floaters, curtains.     CL: scleral lenses left eye   Can wear CL right eye due to corneal edema developing.   Glasses - none  Meds:    - Prednisolone QID right eye   - Ofloxacin QID right eye   - Erythromycin ointment QID OD  Surgery : right eye PKP 2009 & 2010.    A/P:  #Keratoconus each eye  # History of right eye PKPx2 with failing graft  # S/p right eye attempted DMEK 3/29/23, aborted due to obstructed view from corneal edema (see OP note).  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 Disc possible need for pupiloplasty, disc r, b and PC for future surgery    PLAN:  - continue pred forte QID right eye.   - continue ofloxacin QID right eye.   - continue erythromycin qid  - No lifting over 2lbs, no bending/straining; no water in the eye  - Eye shield at night  - RD/endophthalmitis precautions discussed   - removed conj foreign body nasally.    RTC:  POM1 VT- call sooner with any problems    Mick Paul      Attending Physician Attestation:  Complete documentation of historical and exam elements from today's encounter can be found in the full encounter summary report (not reduplicated in this progress note).  I personally obtained the chief complaint(s) and history of present illness.  I confirmed and edited as necessary the review of systems, past medical/surgical history, family history, social history, and examination findings as documented by others; and I examined  the patient myself.  I personally reviewed the relevant tests, images, and reports as documented above.  I formulated and edited as necessary the assessment and plan and discussed the findings and management plan with the patient and family. - Mick Paul MD

## 2023-05-02 NOTE — NURSING NOTE
Chief Complaints and History of Present Illnesses   Patient presents with     Post Op (Ophthalmology) Right Eye     POM1 PKP right eye      Chief Complaint(s) and History of Present Illness(es)     Post Op (Ophthalmology) Right Eye            Laterality: right eye    Associated symptoms: Negative for eye pain and photophobia    Treatments tried: eye drops and ointment    Pain scale: 0/10    Comments: POM1 PKP right eye           Comments    Pt states right eye vision has been improving since last visit.  He tells me he's feeling the stitches behind both Upper/Lower eyelids when right eye closed or blinking.    Ocular meds:  Pred forte QID right eye   Ofloxacin QID right eye   Erythromycin QID right eye   -Not sleeping with eye shield    Bello Pedersen 10:24 AM May 2, 2023

## 2023-05-02 NOTE — PATIENT INSTRUCTIONS
Right Eye Drops 5/2/23  Change prednisone eye drop (pink top) 5 times a day.  Use antibiotic (tan top) eye drop 4 times d ay for 3 days.  Use erythromycin ointment 4 times a day.

## 2023-05-04 ENCOUNTER — TELEPHONE (OUTPATIENT)
Dept: FAMILY MEDICINE | Facility: CLINIC | Age: 53
End: 2023-05-04

## 2023-05-04 ENCOUNTER — ALLIED HEALTH/NURSE VISIT (OUTPATIENT)
Dept: FAMILY MEDICINE | Facility: CLINIC | Age: 53
End: 2023-05-04
Payer: COMMERCIAL

## 2023-05-04 VITALS
OXYGEN SATURATION: 96 % | SYSTOLIC BLOOD PRESSURE: 160 MMHG | DIASTOLIC BLOOD PRESSURE: 96 MMHG | HEART RATE: 79 BPM | RESPIRATION RATE: 16 BRPM

## 2023-05-04 DIAGNOSIS — I10 ESSENTIAL HYPERTENSION: Primary | ICD-10-CM

## 2023-05-04 PROCEDURE — 99207 PR NO CHARGE NURSE ONLY: CPT

## 2023-05-04 RX ORDER — AMLODIPINE BESYLATE 10 MG/1
10 TABLET ORAL DAILY
Qty: 90 TABLET | Refills: 3 | Status: SHIPPED | OUTPATIENT
Start: 2023-05-04 | End: 2024-05-13

## 2023-05-04 NOTE — TELEPHONE ENCOUNTER
Tino Sidhu is a 52 year old year old patient who comes in today for a Blood Pressure check because of medication change.  4/27: stopped lisinopril. Started amlodipine 5mg daily     Vital Signs as repeated by RN   164/98 p79  160/96 p79     Patient is taking medication as prescribed  Patient is tolerating medications well.  Patient is not monitoring Blood Pressure at home.    Current complaints: none  Disposition:  Routed to provider for review.  Preferred pharmacy: [facility], [place]     * pt also brings his accu check BG monitor & asks to review how to use. States his wife has been in the hospital for last 2 mos & she usually tells him how to use it. He has not been using lately.   Pt also states had recent eye surg (had corneal transplant) & states hard to see the monitor.   Pt instructed on how to use & was able to set up & check own BG. Feel confident he can now check BG's at home.  Told to call clinic with any questions.     Mary John RN

## 2023-05-04 NOTE — PROGRESS NOTES
Tino Gleason is a 52 year old year old patient who comes in today for a Blood Pressure check because of medication change.  4/27: stopped lisinopril. Started amlodipine 5mg daily    Vital Signs as repeated by RN   164/98 p79  160/96 p79    Patient is taking medication as prescribed  Patient is tolerating medications well.  Patient is not monitoring Blood Pressure at home.    Current complaints: none  Disposition:  Routed to provider for review.  Preferred pharmacy: [facility], [place]    * pt also brings his accu check BG monitor & asks to review how to use. States his wife has been in the hospital for last 2 mos & she usually tells him how to use it. He has not been using lately.   Pt also states had recent eye surg (had corneal transplant) & states hard to see the monitor.   Pt instructed on how to use & was able to set up & check own BG. Feel confident he can now check BG's at home.  Told to call clinic with any questions.    Mary John RN

## 2023-05-04 NOTE — TELEPHONE ENCOUNTER
Have patient double his amlodipine.  I will refill with 10 mg tabs when he runs out.  This was sent to his [facility] pharmacy.  Recheck BP after one week of the amlodipine at 10 mg.  Thanks.  Thea Olvera NP on 5/4/2023 at 3:14 PM

## 2023-05-04 NOTE — TELEPHONE ENCOUNTER
Pt was notified, and verbalized understanding. Appt scheduled for BP check on 5/11/23.    Mindy Kim RN  [facility]

## 2023-05-11 ENCOUNTER — TELEPHONE (OUTPATIENT)
Dept: FAMILY MEDICINE | Facility: CLINIC | Age: 53
End: 2023-05-11

## 2023-05-11 ENCOUNTER — ALLIED HEALTH/NURSE VISIT (OUTPATIENT)
Dept: FAMILY MEDICINE | Facility: CLINIC | Age: 53
End: 2023-05-11
Payer: COMMERCIAL

## 2023-05-11 VITALS — SYSTOLIC BLOOD PRESSURE: 146 MMHG | DIASTOLIC BLOOD PRESSURE: 90 MMHG | HEART RATE: 80 BPM

## 2023-05-11 DIAGNOSIS — I10 ESSENTIAL HYPERTENSION: Primary | ICD-10-CM

## 2023-05-11 PROCEDURE — 99207 PR NO CHARGE NURSE ONLY: CPT

## 2023-05-11 RX ORDER — LISINOPRIL 10 MG/1
10 TABLET ORAL DAILY
Qty: 30 TABLET | Refills: 1 | Status: SHIPPED | OUTPATIENT
Start: 2023-05-11 | End: 2023-05-22

## 2023-05-11 NOTE — PROGRESS NOTES
Tino Gleason is a 52 year old patient who comes in today for a Blood Pressure check because of medication change.  Amlodipine increased to 10 mg.  He also takes metoprolol 50 mg twice daily.   Vital Signs as repeated by RN today:  /94 initially, then 146/90 ten minutes later; Pulse 80.  Patient is taking medication as prescribed.  Patient is tolerating medications well.  Patient is not monitoring Blood Pressure at home.    Current complaints: none  Disposition:  Forwarding to provider to advise please.     Brooke Winslow RN  [facility]

## 2023-05-12 NOTE — TELEPHONE ENCOUNTER
Patient called back and is read PCP instructions below, scheduled next Friday at 930 am on 5-19-23 with the RN, he is told to check home readings and bring in results with him next week to show the RN,patient verbalizes understanding.      MARY Valencia    
Patient was instructed to return call to [facility] main line at [phone] to speak with an RN.    Brooke Winslow RN  [facility]    
Please contact patient and let him know I am going to start lisinopril 10 mg with the amlodipine dose and see if this brings BP down a little more.  I do like that it is going in the right direction.  I recommend recheck BP in clinic with nursing in a week.  I will send this to his pharmacy [facility], [place].  Thea Olvera NP on 5/11/2023 at 10:19 AM    
Tino Gleason is a 52 year old patient who comes in today for a Blood Pressure check because of medication change.  Amlodipine increased to 10 mg.  He also takes metoprolol 50 mg twice daily.   Vital Signs as repeated by RN today:  /94 initially, then 146/90 ten minutes later; Pulse 80.  Patient is taking medication as prescribed.  Patient is tolerating medications well.  Patient is not monitoring Blood Pressure at home.    Current complaints: none  Disposition:  Forwarding to provider to advise please.     Brooke Winslow RN  [facility]      
Ambulatory

## 2023-05-22 ENCOUNTER — TELEPHONE (OUTPATIENT)
Dept: FAMILY MEDICINE | Facility: CLINIC | Age: 53
End: 2023-05-22

## 2023-05-22 ENCOUNTER — ALLIED HEALTH/NURSE VISIT (OUTPATIENT)
Dept: FAMILY MEDICINE | Facility: CLINIC | Age: 53
End: 2023-05-22
Payer: COMMERCIAL

## 2023-05-22 VITALS — HEART RATE: 68 BPM | SYSTOLIC BLOOD PRESSURE: 134 MMHG | DIASTOLIC BLOOD PRESSURE: 82 MMHG

## 2023-05-22 DIAGNOSIS — I10 ESSENTIAL HYPERTENSION: Primary | ICD-10-CM

## 2023-05-22 DIAGNOSIS — I10 ESSENTIAL HYPERTENSION: ICD-10-CM

## 2023-05-22 PROCEDURE — 99207 PR NO CHARGE NURSE ONLY: CPT

## 2023-05-22 RX ORDER — LISINOPRIL 10 MG/1
10 TABLET ORAL DAILY
Qty: 30 TABLET | Refills: 3 | Status: SHIPPED | OUTPATIENT
Start: 2023-05-22 | End: 2023-07-31

## 2023-05-22 NOTE — TELEPHONE ENCOUNTER
Provider's response noted, and during RN visit we had discussed that patient would only receive a call if there were additional instructions, so closing encounter.    Brooke Winslow RN  [facility]

## 2023-05-22 NOTE — TELEPHONE ENCOUNTER
Tino Gleason is a 52 year old patient who comes in today for a Blood Pressure check because of medication change.  Vital Signs as repeated by RN today: /82; Pulse 68.    Patient is taking medication as prescribed.  Patient is tolerating medications well.  Patient is not monitoring Blood Pressure at home.    Current complaints: none  Disposition:  patient to continue with the same medication.     Brooke Winslow RN  [facility]

## 2023-05-22 NOTE — PROGRESS NOTES
Tino Gleason is a 52 year old patient who comes in today for a Blood Pressure check because of medication change.  Vital Signs as repeated by RN today: /82; Pulse 68.    Patient is taking medication as prescribed.  Patient is tolerating medications well.  Patient is not monitoring Blood Pressure at home.    Current complaints: none  Disposition:  patient to continue with the same medication.     Brooke Winslow RN  [facility]

## 2023-05-22 NOTE — TELEPHONE ENCOUNTER
Refill sent on Lisinopril to his [facility] pharmacy for 1 year.  Thea Olvera NP on 5/22/2023 at 9:30 AM

## 2023-05-30 ENCOUNTER — TELEPHONE (OUTPATIENT)
Dept: EDUCATION SERVICES | Facility: CLINIC | Age: 53
End: 2023-05-30

## 2023-05-30 ENCOUNTER — VIRTUAL VISIT (OUTPATIENT)
Dept: EDUCATION SERVICES | Facility: CLINIC | Age: 53
End: 2023-05-30
Payer: COMMERCIAL

## 2023-05-30 DIAGNOSIS — E11.65 TYPE 2 DIABETES MELLITUS WITH HYPERGLYCEMIA, WITHOUT LONG-TERM CURRENT USE OF INSULIN (H): Primary | ICD-10-CM

## 2023-05-30 PROCEDURE — G0108 DIAB MANAGE TRN  PER INDIV: HCPCS | Mod: AE | Performed by: DIETITIAN, REGISTERED

## 2023-05-30 NOTE — TELEPHONE ENCOUNTER
I did add the ozempic on 4/27/23 due to continued elevated blood glucose.  I don't see this on his medication list but I do see my medication order on the encounters.  There is a note about this under the lab on the A1C.  We started 0.25 mg for 4 weeks and then were to increase to 0.5 mg and recheck A1C in 3 months.    Thea Olvera NP on 5/30/2023 at 12:59 PM

## 2023-05-30 NOTE — PROGRESS NOTES
Diabetes Self-Management Education & Support    Presents for: Individual review    Type of Service: Telephone Visit    Originating Location (Patient Location): Home  Distant Location (Provider Location): Offsite  Mode of Communication:  Telephone    Telephone Visit Start Time: 11:00  Telephone Visit End Time (telephone visit stop time): 11:30    How would patient like to obtain AVS? Mail a copy    Assessment Type:   ASSESSMENT:  Patient presents for education for Type 2 diabetes.  No questions/concerns noted today.  States he is taking Metformin and Ozempic.  Ozempic is not noted in the medical record.  Patient just picked up a refill and states he is confused about the dose.  He initiated the Ozempic at 0.5mg, but the refill indicates to take 0.25mg.  Is testing glucose 3x/day.  Fasting #s are ~130.  Trying to follow a keto diet.    Patient's most recent   Lab Results   Component Value Date    A1C 9.8 04/27/2023     is not meeting goal of <7.0    Diabetes knowledge and skills assessment:   Patient is knowledgeable in diabetes management concepts related to: Monitoring    Continue education with the following diabetes management concepts: Healthy Eating, Being Active, Monitoring, Taking Medication, Problem Solving and Reducing Risks    Based on learning assessment above, most appropriate setting for further diabetes education would be: Individual setting.      PLAN    Continue Ozempic at 0.5mg dose once weekly.  Will discuss Rx issue with PCP.  Recommend carb controlled diet with 30-60gm per meal, 0-20 gm per snack.  Allowing more carb than the traditional keto diet with improve diet balance and chance of long-term success.  Consider testing glucose 2 hrs post-prandial 3x/week.    Topics to cover at upcoming visits: Problem Solving    Follow-up: per patient    See Care Plan for co-developed, patient-state behavior change goals.  AVS provided for patient today.    Education Materials Provided:  No new materials  "provided today      SUBJECTIVE/OBJECTIVE:  Presents for: Individual review  Accompanied by: Self  Diabetes education in the past 24mo: No  Focus of Visit: Patient Unsure  Diabetes type: Type 2  Disease course: Improving  How confident are you filling out medical forms by yourself:: Not Assessed  Other concerns:: None  Cultural Influences/Ethnic Background:  Not  or       Diabetes Symptoms & Complications:     Complications assessed today?: No    Patient Problem List and Family Medical History reviewed for relevant medical history, current medical status, and diabetes risk factors.    Vitals:  There were no vitals taken for this visit.  Estimated body mass index is 44.74 kg/m  as calculated from the following:    Height as of 4/27/23: 1.79 m (5' 10.47\").    Weight as of 4/27/23: 143.3 kg (316 lb).   Last 3 BP:   BP Readings from Last 3 Encounters:   05/22/23 134/82   05/11/23 (!) 146/90   05/04/23 (!) 160/96       History   Smoking Status     Never   Smokeless Tobacco     Never       Labs:  Lab Results   Component Value Date    A1C 9.8 04/27/2023     Lab Results   Component Value Date     04/27/2023     03/29/2023     04/27/2022     Lab Results   Component Value Date     04/27/2023     Direct Measure HDL   Date Value Ref Range Status   04/27/2023 28 (L) >=40 mg/dL Final   ]  GFR Estimate   Date Value Ref Range Status   04/27/2023 83 >60 mL/min/1.73m2 Final     Comment:     eGFR calculated using 2021 CKD-EPI equation.     No results found for: GFRESTBLACK  Lab Results   Component Value Date    CR 1.08 04/27/2023     No results found for: MICROALBUMIN    Healthy Eating:  Healthy Eating Assessed Today: Yes  Cultural/Restorationism diet restrictions?: No  Meal planning/habits: Carb counting, Low carb, Other  Meals include: Breakfast, Lunch, Dinner  Lunch: keto bread sandwich OR grilled cheese sandwich  Dinner: salad, berries/watermelon, protein  Snacks: cheese, \"keto " "snacks\"  Beverages: Water, Milk  Has patient met with a dietitian in the past?: No    Being Active:  Being Active Assessed Today: No    Monitoring:  Monitoring Assessed Today: Yes  Did patient bring glucose meter to appointment? : Yes  Times checking blood sugar at home (number): 3  Times checking blood sugar at home (per): Day  Blood glucose trend: Fluctuating (fasting/pre-meal: 108-150)        Taking Medications:  Diabetes Medication(s)     Biguanides       metFORMIN (GLUCOPHAGE XR) 500 MG 24 hr tablet    Take 500 mg by mouth 2 times daily (with meals)          Taking Medication Assessed Today: Yes  Current Treatments: Oral Medication (taken by mouth), Non-insulin Injectables  Problems taking diabetes medications regularly?: No  Diabetes medication side effects?: No    Problem Solving:  Problem Solving Assessed Today: Yes  Is the patient at risk for hypoglycemia?: No  Is the patient at risk for DKA?: No              Reducing Risks:  Reducing Risks Assessed Today: Yes  Diabetes Risks: Age over 45 years, Hypertriglyceridemia  CAD Risks: Diabetes Mellitus, Hypertension, Male sex, Obesity    Healthy Coping:  Healthy Coping Assessed Today: Yes  Emotional response to diabetes: Ready to learn  Stage of change: ACTION (Actively working towards change)  Support resources: None  Patient Activation Measure Survey Score:       View : No data to display.                  Care Plan and Education Provided:  Care Plan: Diabetes   Updates made by Dana Connolly RD since 5/30/2023 12:00 AM      Problem: HbA1C Not In Goal       Goal: Establish Regular Follow-Ups with PCP       Task: Discuss with PCP the recommended timing for patient's next follow up visit(s)    Responsible User: Dana Connolly RD      Task: Discuss schedule for PCP visits with patient    Responsible User: Dana Connolly RD      Goal: Get HbA1C Level in Goal       Task: Educate patient on diabetes education self-management topics    Responsible User: " Dana Connolly RD      Task: Educate patient on benefits of regular glucose monitoring    Responsible User: Dana Connolly RD      Task: Refer patient to appropriate extended care team member, as needed (Medication Therapy Management, Behavioral Health, Physical Therapy, etc.)    Responsible User: Dana Connolly RD      Task: Discuss diabetes treatment plan with patient    Responsible User: Dana Connolly RD      Problem: Diabetes Self-Management Education Needed to Optimize Self-Care Behaviors       Goal: Understand diabetes pathophysiology and disease progression       Task: Provide education on diabetes pathophysiology and disease progression specfic to patient's diabetes type    Responsible User: Dana Connolly RD      Goal: Healthy Eating - follow a healthy eating pattern for diabetes       Task: Provide education on portion control and consistency in amount, composition and timing of food intake Completed 5/30/2023   Responsible User: Dana Connolly RD      Task: Provide education on managing carbohydrate intake (carbohydrate counting, plate planning method, etc.) Completed 5/30/2023   Responsible User: Dana Connolly RD      Task: Provide education on weight management    Responsible User: Dana Connolly RD      Task: Provide education on heart healthy eating    Responsible User: Dana Connolly RD      Task: Provide education on eating out    Responsible User: Dana Connolly RD      Task: Develop individualized healthy eating plan with patient Completed 5/30/2023   Responsible User: Dana Connolly RD      Goal: Being Active - get regular physical activity, working up to at least 150 minutes per week       Task: Provide education on relationship of activity to glucose and precautions to take if at risk for low glucose    Responsible User: Dana Connolly RD      Task: Discuss barriers to physical activity with patient    Responsible User: Dana Connolly RD       Task: Develop physical activity plan with patient    Responsible User: Dana Connolly RD      Task: Explore community resources including walking groups, assistance programs, and home videos    Responsible User: Dana Connolly RD      Goal: Monitoring - monitor glucose and ketones as directed       Task: Provide education on blood glucose monitoring (purpose, proper technique, frequency, glucose targets, interpreting results, when to use glucose control solution, sharps disposal) Completed 5/30/2023   Responsible User: Dana Connolly RD      Task: Provide education on continuous glucose monitoring (sensor placement, use of ann marie or /reader, understanding glucose trends, alerts and alarms, differences between sensor glucose and blood glucose)    Responsible User: Dana Connolly RD      Task: Provide education on ketone monitoring (when to monitor, frequency, etc.)    Responsible User: Dana Connolly RD      Goal: Taking Medication - patient is consistently taking medications as directed       Task: Provide education on action of prescribed medication, including when to take and possible side effects Completed 5/30/2023   Responsible User: Dana Connolly RD      Task: Provide education on insulin and injectable diabetes medications, including administration, storage, site selection and rotation for injection sites Completed 5/30/2023   Responsible User: aDna Connolly RD      Task: Discuss barriers to medication adherence with patient and provide management technique ideas as appropriate    Responsible User: Dana Connolly RD      Task: Provide education on frequency and refill details of medications    Responsible User: Dana Connolly RD      Goal: Problem Solving - know how to prevent and manage short-term diabetes complications       Task: Provide education on high blood glucose - causes, signs/symptoms, prevention and treatment    Responsible User: Dana Connolly RD       Task: Provide education on low blood glucose - causes, signs/symptoms, prevention, treatment, carrying a carbohydrate source at all times, and medical identification    Responsible User: Dana Connolly RD      Task: Provide education on safe travel with diabetes    Responsible User: Dana Connolly RD      Task: Provide education on how to care for diabetes on sick days    Responsible User: Dana Connolly RD      Task: Provide education on when to call a health care provider    Responsible User: Dana Connolly RD      Goal: Reducing Risks - know how to prevent and treat long-term diabetes complications       Task: Provide education on major complications of diabetes, prevention, early diagnostic measures and treatment of complications    Responsible User: Dana Connolly RD      Task: Provide education on recommended care for dental, eye and foot health    Responsible User: Dana Connolly RD      Task: Provide education on Hemoglobin A1c - goals and relationship to blood glucose levels Completed 5/30/2023   Responsible User: Dana Connolly RD      Task: Provide education on recommendations for heart health - lipid levels and goals, blood pressure and goals, and aspirin therapy, if indicated    Responsible User: Dana Connolly RD      Task: Provide education on tobacco cessation    Responsible User: Dana Connolly RD      Goal: Healthy Coping - use available resources to cope with the challenges of managing diabetes       Task: Discuss recognizing feelings about having diabetes    Responsible User: Dana Connolly RD      Task: Provide education on the benefits of making appropriate lifestyle changes    Responsible User: Dana Connolly RD      Task: Provide education on benefits of utilizing support systems    Responsible User: Dana Connolly RD      Task: Discuss methods for coping with stress    Responsible User: Dana Connolly RD      Task: Provide education on  when to seek professional counseling    Responsible User: Dana Connolly RD Tami Carpenter, RDN LD [facility]      Time Spent: 30 minutes  Encounter Type: Individual    Any diabetes medication dose changes were made via the CDE Protocol per the patient's referring provider. A copy of this encounter was shared with the provider.

## 2023-05-30 NOTE — LETTER
5/30/2023         RE: Tino Gleason  [address]  [place] [address]        Dear Colleague,    Thank you for referring your patient, Tino Gleason, to the [facility]. Please see a copy of my visit note below.    Diabetes Self-Management Education & Support    Presents for: Individual review    Type of Service: Telephone Visit    Originating Location (Patient Location): Home  Distant Location (Provider Location): Offsite  Mode of Communication:  Telephone    Telephone Visit Start Time: 11:00  Telephone Visit End Time (telephone visit stop time): 11:30    How would patient like to obtain AVS? Mail a copy    Assessment Type:   ASSESSMENT:  Patient presents for education for Type 2 diabetes.  No questions/concerns noted today.  States he is taking Metformin and Ozempic.  Ozempic is not noted in the medical record.  Patient just picked up a refill and states he is confused about the dose.  He initiated the Ozempic at 0.5mg, but the refill indicates to take 0.25mg.  Is testing glucose 3x/day.  Fasting #s are ~130.  Trying to follow a keto diet.    Patient's most recent   Lab Results   Component Value Date    A1C 9.8 04/27/2023     is not meeting goal of <7.0    Diabetes knowledge and skills assessment:   Patient is knowledgeable in diabetes management concepts related to: Monitoring    Continue education with the following diabetes management concepts: Healthy Eating, Being Active, Monitoring, Taking Medication, Problem Solving and Reducing Risks    Based on learning assessment above, most appropriate setting for further diabetes education would be: Individual setting.      PLAN    Continue Ozempic at 0.5mg dose once weekly.  Will discuss Rx issue with PCP.  Recommend carb controlled diet with 30-60gm per meal, 0-20 gm per snack.  Allowing more carb than the traditional keto diet with improve diet balance and chance of long-term success.  Consider testing glucose 2 hrs  "post-prandial 3x/week.    Topics to cover at upcoming visits: Problem Solving    Follow-up: per patient    See Care Plan for co-developed, patient-state behavior change goals.  AVS provided for patient today.    Education Materials Provided:  No new materials provided today      SUBJECTIVE/OBJECTIVE:  Presents for: Individual review  Accompanied by: Self  Diabetes education in the past 24mo: No  Focus of Visit: Patient Unsure  Diabetes type: Type 2  Disease course: Improving  How confident are you filling out medical forms by yourself:: Not Assessed  Other concerns:: None  Cultural Influences/Ethnic Background:  Not  or       Diabetes Symptoms & Complications:     Complications assessed today?: No    Patient Problem List and Family Medical History reviewed for relevant medical history, current medical status, and diabetes risk factors.    Vitals:  There were no vitals taken for this visit.  Estimated body mass index is 44.74 kg/m  as calculated from the following:    Height as of 4/27/23: 1.79 m (5' 10.47\").    Weight as of 4/27/23: 143.3 kg (316 lb).   Last 3 BP:   BP Readings from Last 3 Encounters:   05/22/23 134/82   05/11/23 (!) 146/90   05/04/23 (!) 160/96       History   Smoking Status     Never   Smokeless Tobacco     Never       Labs:  Lab Results   Component Value Date    A1C 9.8 04/27/2023     Lab Results   Component Value Date     04/27/2023     03/29/2023     04/27/2022     Lab Results   Component Value Date     04/27/2023     Direct Measure HDL   Date Value Ref Range Status   04/27/2023 28 (L) >=40 mg/dL Final   ]  GFR Estimate   Date Value Ref Range Status   04/27/2023 83 >60 mL/min/1.73m2 Final     Comment:     eGFR calculated using 2021 CKD-EPI equation.     No results found for: GFRESTBLACK  Lab Results   Component Value Date    CR 1.08 04/27/2023     No results found for: MICROALBUMIN    Healthy Eating:  Healthy Eating Assessed Today: " "Yes  Cultural/Adventist diet restrictions?: No  Meal planning/habits: Carb counting, Low carb, Other  Meals include: Breakfast, Lunch, Dinner  Lunch: keto bread sandwich OR grilled cheese sandwich  Dinner: salad, berries/watermelon, protein  Snacks: cheese, \"keto snacks\"  Beverages: Water, Milk  Has patient met with a dietitian in the past?: No    Being Active:  Being Active Assessed Today: No    Monitoring:  Monitoring Assessed Today: Yes  Did patient bring glucose meter to appointment? : Yes  Times checking blood sugar at home (number): 3  Times checking blood sugar at home (per): Day  Blood glucose trend: Fluctuating (fasting/pre-meal: 108-150)        Taking Medications:  Diabetes Medication(s)     Biguanides       metFORMIN (GLUCOPHAGE XR) 500 MG 24 hr tablet    Take 500 mg by mouth 2 times daily (with meals)          Taking Medication Assessed Today: Yes  Current Treatments: Oral Medication (taken by mouth), Non-insulin Injectables  Problems taking diabetes medications regularly?: No  Diabetes medication side effects?: No    Problem Solving:  Problem Solving Assessed Today: Yes  Is the patient at risk for hypoglycemia?: No  Is the patient at risk for DKA?: No              Reducing Risks:  Reducing Risks Assessed Today: Yes  Diabetes Risks: Age over 45 years, Hypertriglyceridemia  CAD Risks: Diabetes Mellitus, Hypertension, Male sex, Obesity    Healthy Coping:  Healthy Coping Assessed Today: Yes  Emotional response to diabetes: Ready to learn  Stage of change: ACTION (Actively working towards change)  Support resources: None  Patient Activation Measure Survey Score:       View : No data to display.                  Care Plan and Education Provided:  Care Plan: Diabetes   Updates made by Dana Connolly RD since 5/30/2023 12:00 AM      Problem: HbA1C Not In Goal       Goal: Establish Regular Follow-Ups with PCP       Task: Discuss with PCP the recommended timing for patient's next follow up visit(s)  "   Responsible User: Dana Connolly RD      Task: Discuss schedule for PCP visits with patient    Responsible User: Dana Connolly RD      Goal: Get HbA1C Level in Goal       Task: Educate patient on diabetes education self-management topics    Responsible User: Dana Connolly RD      Task: Educate patient on benefits of regular glucose monitoring    Responsible User: Dana Connolly RD      Task: Refer patient to appropriate extended care team member, as needed (Medication Therapy Management, Behavioral Health, Physical Therapy, etc.)    Responsible User: Dana Connolly RD      Task: Discuss diabetes treatment plan with patient    Responsible User: Dana Connolly RD      Problem: Diabetes Self-Management Education Needed to Optimize Self-Care Behaviors       Goal: Understand diabetes pathophysiology and disease progression       Task: Provide education on diabetes pathophysiology and disease progression specfic to patient's diabetes type    Responsible User: Dana Connolly RD      Goal: Healthy Eating - follow a healthy eating pattern for diabetes       Task: Provide education on portion control and consistency in amount, composition and timing of food intake Completed 5/30/2023   Responsible User: Dana Connolly RD      Task: Provide education on managing carbohydrate intake (carbohydrate counting, plate planning method, etc.) Completed 5/30/2023   Responsible User: Dana Connolly RD      Task: Provide education on weight management    Responsible User: Dana Connolly RD      Task: Provide education on heart healthy eating    Responsible User: Dana Connolly RD      Task: Provide education on eating out    Responsible User: Dana Connolly RD      Task: Develop individualized healthy eating plan with patient Completed 5/30/2023   Responsible User: Dana Connolly RD      Goal: Being Active - get regular physical activity, working up to at least 150 minutes per week        Task: Provide education on relationship of activity to glucose and precautions to take if at risk for low glucose    Responsible User: Dana Connolly RD      Task: Discuss barriers to physical activity with patient    Responsible User: Dana Connolly RD      Task: Develop physical activity plan with patient    Responsible User: Dana Connolly RD      Task: Explore community resources including walking groups, assistance programs, and home videos    Responsible User: Dana Connolly RD      Goal: Monitoring - monitor glucose and ketones as directed       Task: Provide education on blood glucose monitoring (purpose, proper technique, frequency, glucose targets, interpreting results, when to use glucose control solution, sharps disposal) Completed 5/30/2023   Responsible User: Dana Connolly RD      Task: Provide education on continuous glucose monitoring (sensor placement, use of ann marie or /reader, understanding glucose trends, alerts and alarms, differences between sensor glucose and blood glucose)    Responsible User: Dana Connolly RD      Task: Provide education on ketone monitoring (when to monitor, frequency, etc.)    Responsible User: Dana Connolly RD      Goal: Taking Medication - patient is consistently taking medications as directed       Task: Provide education on action of prescribed medication, including when to take and possible side effects Completed 5/30/2023   Responsible User: Dana Connolly RD      Task: Provide education on insulin and injectable diabetes medications, including administration, storage, site selection and rotation for injection sites Completed 5/30/2023   Responsible User: Dana Connolly RD      Task: Discuss barriers to medication adherence with patient and provide management technique ideas as appropriate    Responsible User: Dana Connolly RD      Task: Provide education on frequency and refill details of medications     Responsible User: Dana Connolly RD      Goal: Problem Solving - know how to prevent and manage short-term diabetes complications       Task: Provide education on high blood glucose - causes, signs/symptoms, prevention and treatment    Responsible User: Dana Connolly RD      Task: Provide education on low blood glucose - causes, signs/symptoms, prevention, treatment, carrying a carbohydrate source at all times, and medical identification    Responsible User: Dana Connolly RD      Task: Provide education on safe travel with diabetes    Responsible User: Dana Connolly RD      Task: Provide education on how to care for diabetes on sick days    Responsible User: Dana Connolly RD      Task: Provide education on when to call a health care provider    Responsible User: Dana Connolly RD      Goal: Reducing Risks - know how to prevent and treat long-term diabetes complications       Task: Provide education on major complications of diabetes, prevention, early diagnostic measures and treatment of complications    Responsible User: Dana Connolly RD      Task: Provide education on recommended care for dental, eye and foot health    Responsible User: Dana Connolly RD      Task: Provide education on Hemoglobin A1c - goals and relationship to blood glucose levels Completed 5/30/2023   Responsible User: Dana Connolly RD      Task: Provide education on recommendations for heart health - lipid levels and goals, blood pressure and goals, and aspirin therapy, if indicated    Responsible User: Dana Connolly RD      Task: Provide education on tobacco cessation    Responsible User: Dana Connolly RD      Goal: Healthy Coping - use available resources to cope with the challenges of managing diabetes       Task: Discuss recognizing feelings about having diabetes    Responsible User: Dana Connolly RD      Task: Provide education on the benefits of making appropriate lifestyle  changes    Responsible User: Dana Connolly RD      Task: Provide education on benefits of utilizing support systems    Responsible User: Dana Connolly RD      Task: Discuss methods for coping with stress    Responsible User: Dana Connolly RD      Task: Provide education on when to seek professional counseling    Responsible User: Dana Connolly RD Tami Carpenter, RDN LD [facility]      Time Spent: 30 minutes  Encounter Type: Individual    Any diabetes medication dose changes were made via the CDE Protocol per the patient's referring provider. A copy of this encounter was shared with the provider.

## 2023-05-30 NOTE — PATIENT INSTRUCTIONS
MY DIABETES TODAY:    1)  Goal A1C is under <7.0  Mine is:      Lab Results   Component Value Date    A1C 9.8 04/27/2023       2)  Goal LDL (bad cholesterol) under 100  (measured at least yearly)- I am currently at:   Lab Results   Component Value Date     04/27/2023       3)  Goal blood pressure under 130/80- mine was Data Unavailable today    Care Plan:  Continue Ozempic at 0.5mg dose once weekly.  Will discuss Rx issue with PCP.  Recommend carb controlled diet with 30-60gm per meal, 0-20 gm per snack.  Allowing more carb than the traditional keto diet with improve diet balance and chance of long-term success.  Consider testing glucose 2 hrs post-prandial 3x/week.    Follow up:  Call ([phone]), e-mail ([email or web address]), or send MyChart message with questions, concerns or if follow-up is needed.  Follow-up for annual diabetes education review in 1 year or sooner, if needed.     Bring blood glucose meter and logbook with you to all doctor and follow-up appointments.     [facility] for the [facility]:  For Your Diabetes or Nutrition Education Appointments Call:  [phone]   For Diabetes or Nutrition Related Questions:   [phone]  Send MyChart Message   If you need a medication refill please contact your pharmacy. Please allow 3 business days for your refills to be completed.

## 2023-05-31 NOTE — TELEPHONE ENCOUNTER
I believe if you sign the pended order that the Ozempic will show up on the med list.  It should not send to the pharmacy as I selected 'No Print Out.'  Thanks!  DANIEL Carter [facility]

## 2023-06-01 ENCOUNTER — OFFICE VISIT (OUTPATIENT)
Dept: OPHTHALMOLOGY | Facility: CLINIC | Age: 53
End: 2023-06-01
Attending: OPHTHALMOLOGY
Payer: COMMERCIAL

## 2023-06-01 DIAGNOSIS — H26.229: ICD-10-CM

## 2023-06-01 DIAGNOSIS — H21.531 IRIDODIALYSIS OF RIGHT EYE: Primary | ICD-10-CM

## 2023-06-01 DIAGNOSIS — Z98.890 POSTOPERATIVE EYE STATE: ICD-10-CM

## 2023-06-01 PROCEDURE — 99024 POSTOP FOLLOW-UP VISIT: CPT | Performed by: OPHTHALMOLOGY

## 2023-06-01 PROCEDURE — G0463 HOSPITAL OUTPT CLINIC VISIT: HCPCS | Performed by: OPHTHALMOLOGY

## 2023-06-01 RX ORDER — ERYTHROMYCIN 5 MG/G
0.5 OINTMENT OPHTHALMIC 4 TIMES DAILY
Qty: 7 G | Refills: 4 | Status: SHIPPED | OUTPATIENT
Start: 2023-06-01 | End: 2023-11-14

## 2023-06-01 RX ORDER — PREDNISOLONE ACETATE 10 MG/ML
1 SUSPENSION/ DROPS OPHTHALMIC
Qty: 10 ML | Refills: 11 | Status: SHIPPED | OUTPATIENT
Start: 2023-06-01 | End: 2024-05-15

## 2023-06-01 ASSESSMENT — VISUAL ACUITY
OS_PH_CC+: -2
OD_SC: 20/80
OD_PH_SC: 20/50
CORRECTION_TYPE: CONTACTS
METHOD: SNELLEN - LINEAR
OS_CC: 20/40
OS_PH_CC: 20/20

## 2023-06-01 ASSESSMENT — TONOMETRY
IOP_METHOD: TONOPEN
OD_IOP_MMHG: 10
OS_IOP_MMHG: XX

## 2023-06-01 ASSESSMENT — SLIT LAMP EXAM - LIDS
COMMENTS: NORMAL
COMMENTS: NORMAL

## 2023-06-01 ASSESSMENT — PACHYMETRY
OS_CT(UM): 299
OD_CT(UM): 599

## 2023-06-01 NOTE — PROGRESS NOTES
HPI: Mr. Gleason was born in 1970 with Hx of keratoconus. right eye corneal transplant in 2010 - surgeon unknown by pt.. Now right eye can not wear CL without corneal edema developing.   Pt started to wear glasses at age 5.  Pt states that left eye has changed yearly.  No old records available.    Interval 6/1/23: POM2 S/P right eye PKP.    CL: scleral lenses left eye   Can wear CL right eye due to corneal edema developing.   Glasses - none  Meds:    - Prednisolone QID right eye   - Ofloxacin QID right eye   - Erythromycin ointment QID OD  Surgery : right eye PKP 2009 & 2010.    A/P:  #Keratoconus each eye  # History of right eye PKPx2 with failing graft  # S/p right eye attempted DMEK 3/29/23, aborted due to obstructed view from corneal edema (see OP note).  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 Disc possible need for pupiloplasty, disc r, b and PC for future surgery    PLAN:  - continue pred forte QID right eye.   - stop ofloxacin QID right eye.   - continue erythromycin qid  - No lifting over 20 lbs, no bending/straining; no water in the eye  - Eye shield at night  - RD/endophthalmitis precautions discussed     RTC:  1 - 2 months VT- call sooner with any problems     Mick emmanuel MD    Attending Physician Attestation:  Complete documentation of historical and exam elements from today's encounter can be found in the full encounter summary report (not reduplicated in this progress note).  I personally obtained the chief complaint(s) and history of present illness.  I confirmed and edited as necessary the review of systems, past medical/surgical history, family history, social history, and examination findings as documented by others; and I examined the patient myself.  I personally reviewed the relevant tests, images, and reports as documented above.  I formulated and edited as necessary the assessment and plan and  discussed the findings and management plan with the patient and family. - Mick Paul MD

## 2023-06-01 NOTE — NURSING NOTE
Chief Complaints and History of Present Illnesses   Patient presents with     Post Op (Ophthalmology) Right Eye     right eye PKP 4/6/23     Chief Complaint(s) and History of Present Illness(es)     Post Op (Ophthalmology) Right Eye            Comments: right eye PKP 4/6/23          Comments    Pt states no problems or concerns  No eye pain     Roxy Bhakta COT 9:16 AM June 1, 2023

## 2023-06-09 ENCOUNTER — TELEPHONE (OUTPATIENT)
Dept: FAMILY MEDICINE | Facility: CLINIC | Age: 53
End: 2023-06-09
Payer: COMMERCIAL

## 2023-06-09 NOTE — TELEPHONE ENCOUNTER
Patient Quality Outreach    Patient is due for the following:   Diabetes -  Diabetic Follow-Up Visit    Next Steps:   Schedule a office visit for 06/22/2023    Type of outreach:    Phone, spoke to patient/parent. pieter mitchell appointment       Questions for provider review:    None           Grady Vizcarra

## 2023-06-09 NOTE — LETTER
June 9, 2023    To  Tino Gleason  [address]   [place] [address]    Your team at [facility] cares about your health. We have reviewed your chart and based on our findings; we are making the following recommendations to better manage your health.     You are in particular need of attention regarding the following:     Schedule a DIABETIC FOLLOW UP appointment for Office Visit. Patients with diabetes should see their provider regularly.    If you have already completed these items, please contact the clinic via phone or   [facility] so your care team can review and update your records. Thank you for   choosing [facility] for your healthcare needs. For any questions,   concerns, or to schedule an appointment please contact our clinic.    Healthy Regards,      Your [facility] Care Team

## 2023-06-13 ENCOUNTER — TELEPHONE (OUTPATIENT)
Dept: OPHTHALMOLOGY | Facility: CLINIC | Age: 53
End: 2023-06-13

## 2023-06-13 NOTE — TELEPHONE ENCOUNTER
[facility] Call Center    Phone Message    May a detailed message be left on voicemail: yes     Reason for Call: Form or Letter   Type or form/letter needing completion: Form faxed on 06/06/2023 from employer for restrictions and when the patient can return to work. Please fax back ASAP  Provider: Dr Paul  Date form needed: ASAP  Once completed: Fax form to: [phone]      Action Taken: Other: EYE     Travel Screening: Not Applicable

## 2023-06-20 NOTE — TELEPHONE ENCOUNTER
[facility] Call Center    Phone Message    May a detailed message be left on voicemail: yes     Reason for Call: Other: Patient dropped off forms last week for Dr Paul to fill out but his employer did not get all the pages. They are missing the first 2 pages.  Patient believes there is 3-4 pages total. Missing questions 1-11 and second page number 12. Patient would like us to refax everything to [phone] and patient can be reached at [phone] with questions. Thank you.    Action Taken: Message routed to:  [facility] Eye    Travel Screening: Not Applicable

## 2023-07-11 ENCOUNTER — OFFICE VISIT (OUTPATIENT)
Dept: OPHTHALMOLOGY | Facility: CLINIC | Age: 53
End: 2023-07-11
Attending: OPHTHALMOLOGY
Payer: COMMERCIAL

## 2023-07-11 DIAGNOSIS — H52.213 IRREGULAR ASTIGMATISM OF BOTH EYES: ICD-10-CM

## 2023-07-11 DIAGNOSIS — H26.229: ICD-10-CM

## 2023-07-11 DIAGNOSIS — H21.531 IRIDODIALYSIS OF RIGHT EYE: ICD-10-CM

## 2023-07-11 DIAGNOSIS — H18.613 STABLE KERATOCONUS OF BOTH EYES: ICD-10-CM

## 2023-07-11 DIAGNOSIS — Z98.890 POSTOPERATIVE EYE STATE: Primary | ICD-10-CM

## 2023-07-11 PROCEDURE — G0463 HOSPITAL OUTPT CLINIC VISIT: HCPCS | Performed by: OPHTHALMOLOGY

## 2023-07-11 PROCEDURE — 99214 OFFICE O/P EST MOD 30 MIN: CPT | Mod: GC | Performed by: OPHTHALMOLOGY

## 2023-07-11 ASSESSMENT — CONF VISUAL FIELD
OS_INFERIOR_NASAL_RESTRICTION: 0
OD_SUPERIOR_NASAL_RESTRICTION: 3
OS_SUPERIOR_NASAL_RESTRICTION: 0
OS_INFERIOR_TEMPORAL_RESTRICTION: 0
OS_SUPERIOR_TEMPORAL_RESTRICTION: 0
OS_NORMAL: 1

## 2023-07-11 ASSESSMENT — VISUAL ACUITY
OD_PH_SC: 20/70
METHOD: SNELLEN - LINEAR
OD_PH_SC+: -1
OD_SC: 20/150
OS_PH_CC: 20/25
OS_CC: 20/50

## 2023-07-11 ASSESSMENT — TONOMETRY
IOP_METHOD: ICARE
OD_IOP_MMHG: 11
OS_IOP_MMHG: CL

## 2023-07-11 ASSESSMENT — SLIT LAMP EXAM - LIDS
COMMENTS: NORMAL
COMMENTS: NORMAL

## 2023-07-11 NOTE — NURSING NOTE
Chief Complaints and History of Present Illnesses   Patient presents with     Cornea Transplant Follow Up     Chief Complaint(s) and History of Present Illness(es)     Cornea Transplant Follow Up            Laterality: right eye    Onset: 5 weeks ago          Comments    Pt. States that he is doing well. No change in VA BE. No pain BE. No flashes or flashes BE.   Letty Wisdom COT 8:50 AM July 11, 2023

## 2023-07-11 NOTE — PROGRESS NOTES
HPI: Mr. Gleason was born in 1970 with Hx of keratoconus. right eye corneal transplant in 2010 - surgeon unknown by pt.. Now right eye can not wear CL without corneal edema developing.   Pt started to wear glasses at age 5.  Pt states that left eye has changed yearly.  No old records available.    Interval 7/11/23: POM3 S/P right eye PKP. Doing well. No new changes in vision or new concerns. Right eye is comfortable. Using drops and ointment.     CL: scleral lenses left eye   Can wear CL right eye due to corneal edema developing.   Glasses - none  Meds:    - Prednisolone QID right eye   - Erythromycin ointment QID OD  Surgery : right eye PKP 2009 & 2010.    A/P:  # Keratoconus each eye  # History of right eye PKPx2 with failing graft  # S/p right eye attempted DMEK 3/29/23, aborted due to obstructed view from corneal edema (see OP note).  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 Disc possible need for pupiloplasty, disc r, b and PC for future surgery    PLAN:  - Cont. pred forte QID right eye.   - continue erythromycin qid  - RD/endophthalmitis precautions discussed     RTC:  3 months VT- call sooner with any problems     Amaury Mcfarland MD  PGY-3 Ophthalmology    Attending Physician Attestation:  Complete documentation of historical and exam elements from today's encounter can be found in the full encounter summary report (not reduplicated in this progress note).  I personally obtained the chief complaint(s) and history of present illness.  I confirmed and edited as necessary the review of systems, past medical/surgical history, family history, social history, and examination findings as documented by others; and I examined the patient myself.  I personally reviewed the relevant tests, images, and reports as documented above.  I formulated and edited as necessary the assessment and plan and discussed the findings and  management plan with the patient and family. - Mick Paul MD

## 2023-07-24 ENCOUNTER — TELEPHONE (OUTPATIENT)
Dept: OPHTHALMOLOGY | Facility: CLINIC | Age: 53
End: 2023-07-24
Payer: COMMERCIAL

## 2023-07-24 NOTE — TELEPHONE ENCOUNTER
[facility] Call Center    Phone Message    May a detailed message be left on voicemail: yes     Reason for Call: Other: Patient called requesting a call back. He states he needs most recent office visit records/restrictions sent to his employer. He is asking this to be uploaded to his [employer].       Action Taken: Other: eye    Travel Screening: Not Applicable

## 2023-07-25 NOTE — TELEPHONE ENCOUNTER
LVM for patient that he is able to view all his office notes as well as letters written in clinic on MyChart.     Bree Hsu, Cornea Facilitator on 7/25/2023 at 10:45 AM

## 2023-07-31 ENCOUNTER — OFFICE VISIT (OUTPATIENT)
Dept: FAMILY MEDICINE | Facility: CLINIC | Age: 53
End: 2023-07-31
Payer: COMMERCIAL

## 2023-07-31 VITALS
DIASTOLIC BLOOD PRESSURE: 94 MMHG | SYSTOLIC BLOOD PRESSURE: 138 MMHG | OXYGEN SATURATION: 97 % | HEART RATE: 82 BPM | TEMPERATURE: 97.1 F | BODY MASS INDEX: 44.1 KG/M2 | HEIGHT: 71 IN | WEIGHT: 315 LBS | RESPIRATION RATE: 16 BRPM

## 2023-07-31 DIAGNOSIS — Z11.59 NEED FOR HEPATITIS C SCREENING TEST: ICD-10-CM

## 2023-07-31 DIAGNOSIS — E11.65 TYPE 2 DIABETES MELLITUS WITH HYPERGLYCEMIA, WITHOUT LONG-TERM CURRENT USE OF INSULIN (H): Primary | ICD-10-CM

## 2023-07-31 DIAGNOSIS — Z11.4 SCREENING FOR HIV (HUMAN IMMUNODEFICIENCY VIRUS): ICD-10-CM

## 2023-07-31 DIAGNOSIS — E78.5 HYPERLIPIDEMIA LDL GOAL <70: ICD-10-CM

## 2023-07-31 DIAGNOSIS — Z12.11 SCREEN FOR COLON CANCER: ICD-10-CM

## 2023-07-31 DIAGNOSIS — I10 ESSENTIAL HYPERTENSION: ICD-10-CM

## 2023-07-31 LAB
ANION GAP SERPL CALCULATED.3IONS-SCNC: 11 MMOL/L (ref 7–15)
BUN SERPL-MCNC: 14.4 MG/DL (ref 6–20)
CALCIUM SERPL-MCNC: 10.2 MG/DL (ref 8.6–10)
CHLORIDE SERPL-SCNC: 104 MMOL/L (ref 98–107)
CHOLEST SERPL-MCNC: 107 MG/DL
CREAT SERPL-MCNC: 1.07 MG/DL (ref 0.67–1.17)
DEPRECATED HCO3 PLAS-SCNC: 26 MMOL/L (ref 22–29)
GFR SERPL CREATININE-BSD FRML MDRD: 84 ML/MIN/1.73M2
GLUCOSE SERPL-MCNC: 141 MG/DL (ref 70–99)
HBA1C MFR BLD: 7.8 % (ref 0–5.6)
HDLC SERPL-MCNC: 36 MG/DL
LDLC SERPL CALC-MCNC: 50 MG/DL
NONHDLC SERPL-MCNC: 71 MG/DL
POTASSIUM SERPL-SCNC: 4.3 MMOL/L (ref 3.4–5.3)
SODIUM SERPL-SCNC: 141 MMOL/L (ref 136–145)
TRIGL SERPL-MCNC: 106 MG/DL

## 2023-07-31 PROCEDURE — 84244 ASSAY OF RENIN: CPT | Mod: 90 | Performed by: NURSE PRACTITIONER

## 2023-07-31 PROCEDURE — 99000 SPECIMEN HANDLING OFFICE-LAB: CPT | Performed by: NURSE PRACTITIONER

## 2023-07-31 PROCEDURE — 83036 HEMOGLOBIN GLYCOSYLATED A1C: CPT | Performed by: NURSE PRACTITIONER

## 2023-07-31 PROCEDURE — 99214 OFFICE O/P EST MOD 30 MIN: CPT | Performed by: NURSE PRACTITIONER

## 2023-07-31 PROCEDURE — 80048 BASIC METABOLIC PNL TOTAL CA: CPT | Performed by: NURSE PRACTITIONER

## 2023-07-31 PROCEDURE — 82088 ASSAY OF ALDOSTERONE: CPT | Performed by: NURSE PRACTITIONER

## 2023-07-31 PROCEDURE — 80061 LIPID PANEL: CPT | Performed by: NURSE PRACTITIONER

## 2023-07-31 PROCEDURE — 36415 COLL VENOUS BLD VENIPUNCTURE: CPT | Performed by: NURSE PRACTITIONER

## 2023-07-31 RX ORDER — LISINOPRIL 20 MG/1
20 TABLET ORAL DAILY
Qty: 90 TABLET | Refills: 3 | Status: SHIPPED | OUTPATIENT
Start: 2023-07-31 | End: 2023-12-01 | Stop reason: DRUGHIGH

## 2023-07-31 RX ORDER — METFORMIN HCL 500 MG
500 TABLET, EXTENDED RELEASE 24 HR ORAL 2 TIMES DAILY WITH MEALS
Qty: 180 TABLET | Refills: 1 | Status: SHIPPED | OUTPATIENT
Start: 2023-07-31 | End: 2024-01-26

## 2023-07-31 ASSESSMENT — PAIN SCALES - GENERAL: PAINLEVEL: NO PAIN (0)

## 2023-07-31 NOTE — LETTER
August 2, 2023      Tino Gleason  [address]   [place] [address]        Dear ,    We are writing to inform you of your test results.    Your aldosterone/renin activity labs are all normal.     Resulted Orders   Hemoglobin A1c   Result Value Ref Range    Hemoglobin A1C 7.8 (H) 0.0 - 5.6 %      Comment:      Normal <5.7%   Prediabetes 5.7-6.4%    Diabetes 6.5% or higher     Note: Adopted from ADA consensus guidelines.   Lipid panel reflex to direct LDL Non-fasting   Result Value Ref Range    Cholesterol 107 <200 mg/dL    Triglycerides 106 <150 mg/dL    Direct Measure HDL 36 (L) >=40 mg/dL    LDL Cholesterol Calculated 50 <=100 mg/dL    Non HDL Cholesterol 71 <130 mg/dL    Narrative    Cholesterol  Desirable:  <200 mg/dL    Triglycerides  Normal:  Less than 150 mg/dL  Borderline High:  150-199 mg/dL  High:  200-499 mg/dL  Very High:  Greater than or equal to 500 mg/dL    Direct Measure HDL  Female:  Greater than or equal to 50 mg/dL   Male:  Greater than or equal to 40 mg/dL    LDL Cholesterol  Desirable:  <100mg/dL  Above Desirable:  100-129 mg/dL   Borderline High:  130-159 mg/dL   High:  160-189 mg/dL   Very High:  >= 190 mg/dL    Non HDL Cholesterol  Desirable:  130 mg/dL  Above Desirable:  130-159 mg/dL  Borderline High:  160-189 mg/dL  High:  190-219 mg/dL  Very High:  Greater than or equal to 220 mg/dL   Basic metabolic panel  (Ca, Cl, CO2, Creat, Gluc, K, Na, BUN)   Result Value Ref Range    Sodium 141 136 - 145 mmol/L    Potassium 4.3 3.4 - 5.3 mmol/L    Chloride 104 98 - 107 mmol/L    Carbon Dioxide (CO2) 26 22 - 29 mmol/L    Anion Gap 11 7 - 15 mmol/L    Urea Nitrogen 14.4 6.0 - 20.0 mg/dL    Creatinine 1.07 0.67 - 1.17 mg/dL    Calcium 10.2 (H) 8.6 - 10.0 mg/dL    Glucose 141 (H) 70 - 99 mg/dL    GFR Estimate 84 >60 mL/min/1.73m2   Aldosterone   Result Value Ref Range    Aldosterone 7.7 0.0 - 31.0 ng/dL       If you have any questions or concerns, please call the clinic  at the number listed above.       Sincerely,      Thea Olvera NP

## 2023-07-31 NOTE — PROGRESS NOTES
Assessment & Plan     Type 2 diabetes mellitus with hyperglycemia, without long-term current use of insulin (H)  Patient is not checking blood sugars regularly but has noticed that they have been improving.  He is working with diabetic education which has been helpful.  Patient is due for next hemoglobin A1c in a month.  I will result this lab and refill his glimepiride if within normal parameters otherwise we did discuss increasing this up to 1 mg every 7 days.  Patient is tolerating the metformin better with extended relief twice daily.  I have refilled this for 6 months.  Plan on follow-up in 3 months for recheck if A1c is elevated.  Continue to be checking blood sugars.  - Hemoglobin A1c; Future  - metFORMIN (GLUCOPHAGE XR) 500 MG 24 hr tablet; Take 1 tablet (500 mg) by mouth 2 times daily (with meals)  - Hemoglobin A1c    Screen for colon cancer  Reminded patient today that he needs to make appointment for his colonoscopy screening.    Hyperlipidemia LDL goal <70  Patient is tolerating Lipitor with no side effects.  Repeating lipid today for recheck.  Patient be notified of results.  - Lipid panel reflex to direct LDL Non-fasting; Future  - Lipid panel reflex to direct LDL Non-fasting    Essential hypertension  Blood pressure still slightly elevated.  Will increase lisinopril to 20 mg daily.  Advised to either nursing appointment or telephone appointment for blood pressure check in 1 week with home blood pressures.  I did do BMP today since starting the lisinopril for recheck on electrolytes and kidney function but with increase he would have to follow-up in 1 month for recheck and he will make appointment for lab for this.  I did also add on renin and aldosterone panel for evaluation as well.  Patient be notified of these results.  - Aldosterone; Future  - Renin activity; Future  - Aldosterone Renin Ratio; Future  - lisinopril (ZESTRIL) 20 MG tablet; Take 1 tablet (20 mg) by mouth daily  - Basic metabolic  panel  (Ca, Cl, CO2, Creat, Gluc, K, Na, BUN); Future  - Aldosterone Renin Ratio  - Basic metabolic panel  (Ca, Cl, CO2, Creat, Gluc, K, Na, BUN)     See Patient Instructions    Thea Olvera NP  [facility] PORTER Jiménez is a 52 year old, presenting for the following health issues:  Diabetes, Lipids, and Hypertension        7/31/2023     1:11 PM   Additional Questions   Roomed by rmb   Accompanied by self         7/31/2023     1:11 PM   Patient Reported Additional Medications   Patient reports taking the following new medications none       HPI     Diabetes Follow-up    How often are you checking your blood sugar? One time daily  What time of day are you checking your blood sugars (select all that apply)?  Before meals  Have you had any blood sugars above 200?  No  Have you had any blood sugars below 70?  no  What symptoms do you notice when your blood sugar is low?  None  What concerns do you have today about your diabetes? None   Do you have any of these symptoms? (Select all that apply)  No numbness or tingling in feet.  No redness, sores or blisters on feet.  No complaints of excessive thirst.  No reports of blurry vision.  No significant changes to weight.  Have you had a diabetic eye exam in the last 12 months? Yes 2023   Had surgery on eye             Hyperlipidemia Follow-Up    Are you regularly taking any medication or supplement to lower your cholesterol?   Yes- Lipitor  Are you having muscle aches or other side effects that you think could be caused by your cholesterol lowering medication?  No    Hypertension Follow-up    Do you check your blood pressure regularly outside of the clinic? No   Are you following a low salt diet? Yes  Are your blood pressures ever more than 140 on the top number (systolic) OR more   than 90 on the bottom number (diastolic), for example 140/90? Yes    BP Readings from Last 2 Encounters:   07/31/23 (!) 138/92   05/22/23 134/82     Hemoglobin  "A1C (%)   Date Value   04/27/2023 9.8 (H)   02/21/2023 12.6 (H)     LDL Cholesterol Calculated (mg/dL)   Date Value   04/27/2023 125 (H)   04/27/2022 112     How many servings of fruits and vegetables do you eat daily?  0-1  On average, how many sweetened beverages do you drink each day (Examples: soda, juice, sweet tea, etc.  Do NOT count diet or artificially sweetened beverages)?   0  How many days per week do you exercise enough to make your heart beat faster? 3 or less  How many minutes a day do you exercise enough to make your heart beat faster? 9 or less  How many days per week do you miss taking your medication? 0    Patient states he has not been having any highs over 200 or lows under 70.  He is having no symptoms of elevated blood pressure.  He is not having any side effects to any of his medications and in fact has improved with the extended relief of the metformin and his GI effects from that.    Review of Systems   CONSTITUTIONAL: NEGATIVE for fever, chills, change in weight  RESP: NEGATIVE for significant cough or SOB  CV: NEGATIVE for chest pain, palpitations or peripheral edema  PSYCHIATRIC: NEGATIVE for changes in mood or affect  ROS otherwise negative      Objective    BP (!) 138/92   Pulse 82   Temp 97.1  F (36.2  C) (Tympanic)   Resp 16   Ht 1.791 m (5' 10.5\")   Wt (!) 150.1 kg (331 lb)   SpO2 97%   BMI 46.82 kg/m    Body mass index is 46.82 kg/m .  Physical Exam   GENERAL: healthy, alert and no distress  RESP: lungs clear to auscultation - no rales, rhonchi or wheezes  CV: regular rate and rhythm, normal S1 S2, no S3 or S4, no murmur, click or rub, no peripheral edema and peripheral pulses strong  PSYCH: mentation appears normal, affect normal/bright  Diabetic foot exam: normal DP and PT pulses, no trophic changes or ulcerative lesions, and normal sensory exam               "

## 2023-07-31 NOTE — PATIENT INSTRUCTIONS
Increase Lisinopril to 20 mg daily.  Take 2 tabs of your 10 mg until gone and refill waiting at pharmacy.  Make nursing appointment or telephone appointment in 1 week for blood pressure recheck. Make appointment in 1 month with lab to recheck electrolytes and kidney function.  Work on low salt and at least a 10 lbs weight loss.  A1C ordered today, will increase ozempic if still elevated over 7%.  Continue to work on diet and exercise to reduce weight since this will help your overall health.  I refilled your metformin for 6 months.  Lipid panel ordered for evaluation of cholesterol.  I will notify you of results.  I also ordered a Renin/Aldosterone panel due to elevated blood pressure to make sure this is normal.  Plan to follow-up in 3 months for recheck diabetes if A1C is elevated.

## 2023-08-01 DIAGNOSIS — E11.65 TYPE 2 DIABETES MELLITUS WITH HYPERGLYCEMIA, WITHOUT LONG-TERM CURRENT USE OF INSULIN (H): Primary | ICD-10-CM

## 2023-08-01 DIAGNOSIS — I10 ESSENTIAL HYPERTENSION: ICD-10-CM

## 2023-08-01 LAB — ALDOST SERPL-MCNC: 7.7 NG/DL (ref 0–31)

## 2023-08-02 LAB — RENIN PLAS-CCNC: 0.3 NG/ML/HR

## 2023-08-03 NOTE — PROGRESS NOTES
Forwarding to PCP for BMP order please.  Pended.   In scrubbing schedule for upcoming BP next week, RN notes in last visit documentation that patient is supposed to have BMP around the end of this month, but no order placed.   Will forward to PCP for future order, then notation made for upcoming RN BP check to remind patient to schedule lab only appt.     Brooke Winslow RN  [facility]

## 2023-08-04 LAB — ALDOST/RENIN PLAS-RTO: 25.7 {RATIO} (ref 0–25)

## 2023-08-10 ENCOUNTER — TELEPHONE (OUTPATIENT)
Dept: FAMILY MEDICINE | Facility: CLINIC | Age: 53
End: 2023-08-10

## 2023-08-10 ENCOUNTER — ALLIED HEALTH/NURSE VISIT (OUTPATIENT)
Dept: FAMILY MEDICINE | Facility: CLINIC | Age: 53
End: 2023-08-10
Payer: COMMERCIAL

## 2023-08-10 VITALS
SYSTOLIC BLOOD PRESSURE: 132 MMHG | HEART RATE: 79 BPM | RESPIRATION RATE: 16 BRPM | OXYGEN SATURATION: 97 % | DIASTOLIC BLOOD PRESSURE: 86 MMHG

## 2023-08-10 DIAGNOSIS — I10 ESSENTIAL HYPERTENSION: Primary | ICD-10-CM

## 2023-08-10 PROCEDURE — 99207 PR NO CHARGE NURSE ONLY: CPT

## 2023-08-10 NOTE — PROGRESS NOTES
Tino Gleason is a 53 year old year old patient who comes in today for a Blood Pressure check because of medication change.  OV 7/31: provider increased Lisinopril to 20mg daily & ordered BMP to be done in 1 mos. Pt has not made appt yet.   Lab appt scheduled for 8/31/23.    Vital Signs as repeated by RN   140/86 p79  132/86 p79    Patient is taking medication as prescribed  Patient is tolerating medications well.  Patient is not monitoring Blood Pressure at home.  has BP machine but cannot find the cuff.  Current complaints: none  Disposition:  routed to provider for review.    Mary John RN

## 2023-08-10 NOTE — TELEPHONE ENCOUNTER
Thea    Tino Gleason is a 53 year old year old patient who comes in today for a Blood Pressure check because of medication change.  OV 7/31: provider increased Lisinopril to 20mg daily & ordered BMP to be done in 1 mos. Pt has not made appt yet.   Lab appt scheduled for 8/31/23.     Vital Signs as repeated by RN   140/86 p79  132/86 p79     Patient is taking medication as prescribed  Patient is tolerating medications well.  Patient is not monitoring Blood Pressure at home.  has BP machine but cannot find the cuff.  Current complaints: none  Disposition:  routed to provider for review.     Mary John RN

## 2023-08-11 ENCOUNTER — NURSE TRIAGE (OUTPATIENT)
Dept: NURSING | Facility: CLINIC | Age: 53
End: 2023-08-11
Payer: COMMERCIAL

## 2023-08-11 NOTE — TELEPHONE ENCOUNTER
"Reason for Disposition   Blurred vision or visual changes and present now and sudden onset or new (e.g., minutes, hours, days)  (Exception: Seeing floaters / black specks OR previously diagnosed migraine headaches with same symptoms.)    Additional Information   Negative: Complete loss of vision in 1 or both eyes   Negative: SEVERE eye pain   Negative: Double vision   Negative: Severe headache    Answer Assessment - Initial Assessment Questions  1. DESCRIPTION: \"What is the vision loss like? Describe it for me.\" (e.g., complete vision loss, blurred vision, double vision, floaters, etc.)      Hazzy and blurry.  H/o cornea transplant in April.  2. LOCATION: \"One or both eyes?\" If one, ask: \"Which eye?\"      Right eye  3. SEVERITY: \"Can you see anything?\" If Yes, ask: \"What can you see?\" (e.g., fine print)     Clock, book and phone, not able to see  4. ONSET: \"When did this begin?\" \"Did it start suddenly or has this been gradual?\"      About a week or two  5. PATTERN: \"Does this come and go, or has it been constant since it started?\"     Gets better some day and worse others   Daytime gets worse and worse.  Hard to see in the morning when wakes up.  6. PAIN: \"Is there any pain in your eye(s)?\"  (Scale 1-10; or mild, moderate, severe)      no  7. CONTACTS-GLASSES: \"Do you wear contacts or glasses?\"     Contacts in the good eye, left  only.  Not in the right eye  8. CAUSE: \"What do you think is causing this visual problem?\"     Unknown, this is his 3rd surgery on the right eye  9. OTHER SYMPTOMS: \"Do you have any other symptoms?\" (e.g., confusion, headache, arm or leg weakness, speech problems)      No headache,  no confusion, no arm or leg weakness.  No flaoters,  No fall or injury  10. PREGNANCY: \"Is there any chance you are pregnant?\" \"When was your last menstrual period?\"        N/a    Protocols used: Vision Loss or Change-A-OH    "

## 2023-08-11 NOTE — TELEPHONE ENCOUNTER
Nurse Triage SBAR    Is this a 2nd Level Triage? YES, LICENSED PRACTITIONER REVIEW IS REQUIRED    Situation: Patient with hazzy blurred vision x 1.5 wks to the right eye    Background:  H/o Cornea transplant to Right eye,  (3 total surgeries)  Patient reports that about 1.5 weeks ago hr started with hazy blurred vision to the right eye.   He is not able to see the clock, use his phone or read a book with that eye.  He drives at night with one eye open, the left eye.  Tries to avoid night time driving.  Patient reports that he may have cataracts as well due to all his surgeries.  He reports no pain.   He is not light sensitive  Drainage is clear to that eye.  When he awakes in the am, he has to blink a lot to clear his vision.  He reports no fall or injury        Protocol Recommended Disposition:     RN route to EYE  Recommendation:   If vision changes get worse, go to the ED'  If pain or headache develop go to the ED      Routed to provider

## 2023-08-11 NOTE — TELEPHONE ENCOUNTER
No changes to medication at this time since blood pressure is at goal below 140/90.  I will follow-up with him on the lab results.  Thea Olvera NP on 8/11/2023 at 9:16 AM

## 2023-08-15 ENCOUNTER — OFFICE VISIT (OUTPATIENT)
Dept: OPHTHALMOLOGY | Facility: CLINIC | Age: 53
End: 2023-08-15
Attending: OPHTHALMOLOGY
Payer: COMMERCIAL

## 2023-08-15 DIAGNOSIS — H26.229: ICD-10-CM

## 2023-08-15 DIAGNOSIS — H21.531 IRIDODIALYSIS OF RIGHT EYE: ICD-10-CM

## 2023-08-15 DIAGNOSIS — Z86.69 HISTORY OF FAILURE OF CORNEAL GRAFT: ICD-10-CM

## 2023-08-15 DIAGNOSIS — Z98.890 POSTOPERATIVE EYE STATE: Primary | ICD-10-CM

## 2023-08-15 DIAGNOSIS — H52.213 IRREGULAR ASTIGMATISM OF BOTH EYES: ICD-10-CM

## 2023-08-15 PROCEDURE — 99214 OFFICE O/P EST MOD 30 MIN: CPT | Mod: GC | Performed by: OPHTHALMOLOGY

## 2023-08-15 ASSESSMENT — VISUAL ACUITY
OD_SC: 10/100
METHOD: SNELLEN - LINEAR

## 2023-08-15 ASSESSMENT — PACHYMETRY
OS_CT(UM): 299
OD_CT(UM): 599

## 2023-08-15 ASSESSMENT — SLIT LAMP EXAM - LIDS
COMMENTS: NORMAL
COMMENTS: NORMAL

## 2023-08-15 NOTE — PROGRESS NOTES
HPI: Mr. Gleason was born in 1970 with Hx of keratoconus. right eye corneal transplant in 2010 - surgeon unknown by pt.. Now right eye can not wear CL without corneal edema developing.   Pt started to wear glasses at age 5.  Pt states that left eye has changed yearly.  No old records available.    Interval 7/11/23: POM3 S/P right eye PKP. Doing well. No new changes in vision or new concerns. Right eye is comfortable. Using drops and ointment.     CL: scleral lenses left eye   Can wear CL right eye due to corneal edema developing.   Glasses - none  Meds:    - Prednisolone QID right eye ( pt has been only using tid).. Pt told to use qid.   - Erythromycin ointment QID OD  Surgery : right eye PKP 2009 & 2010.    A/P:  # Keratoconus each eye  # History of right eye PKPx2 with failing graft  # S/p right eye attempted DMEK 3/29/23, aborted due to obstructed view from corneal edema (see OP note).  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 Disc possible need for pupiloplasty, disc r, b and PC for future surgery  - right eye cataract    PLAN:  - Cont. pred forte QID right eye.   - continue erythromycin qid    RTC:  3 months [place]- call sooner with any problems     Amaury Mcfarland MD  PGY-3 Ophthalmology    Attending Physician Attestation:  Complete documentation of historical and exam elements from today's encounter can be found in the full encounter summary report (not reduplicated in this progress note).  I personally obtained the chief complaint(s) and history of present illness.  I confirmed and edited as necessary the review of systems, past medical/surgical history, family history, social history, and examination findings as documented by others; and I examined the patient myself.  I personally reviewed the relevant tests, images, and reports as documented above.  I formulated and edited as necessary the assessment and plan and  discussed the findings and management plan with the patient and family. - Mick Paul MD

## 2023-08-31 ENCOUNTER — TELEPHONE (OUTPATIENT)
Dept: FAMILY MEDICINE | Facility: CLINIC | Age: 53
End: 2023-08-31

## 2023-08-31 NOTE — TELEPHONE ENCOUNTER
Prior Authorization Retail Medication Request    Medication/Dose: Ozempic 1 MG/Dose  ICD code (if different than what is on RX):    Previously Tried and Failed:    Rationale:    KEY: [id number]    Insurance Name:    Insurance ID:        Pharmacy Information (if different than what is on RX)  Name:    Phone:

## 2023-09-05 NOTE — TELEPHONE ENCOUNTER
Central Prior Authorization Team   Phone: [phone]    PA Initiation    Medication: Ozempic 1 MG/Dose  Insurance Company: [place] Medicaid ([facility]) - Phone [phone] Fax [phone]  Pharmacy Filling the Rx: [facility] [address] [place] [address] - [address]  Filling Pharmacy Phone: [phone]  Filling Pharmacy Fax:    Start Date: 9/5/2023

## 2023-09-07 NOTE — TELEPHONE ENCOUNTER
PRIOR AUTHORIZATION DENIED    Medication: Ozempic 1 MG/Dose    Denial Date: 9/7/2023    Denial Rational:           Appeal Information: Review the plan's reasons for denial listed above. Please utilize that information to complete letter and provide specific, detailed clinical information/rationale of your patient's health status to address their denial reasons.

## 2023-09-08 NOTE — TELEPHONE ENCOUNTER
Please have patient make an appointment for diabetes recheck and we can address this then.  Thea Olvera NP on 9/8/2023 at 2:33 PM

## 2023-10-02 DIAGNOSIS — E11.65 TYPE 2 DIABETES MELLITUS WITH HYPERGLYCEMIA, WITHOUT LONG-TERM CURRENT USE OF INSULIN (H): ICD-10-CM

## 2023-10-02 NOTE — TELEPHONE ENCOUNTER
Pending Prescriptions:                       Disp   Refills    Semaglutide (1 MG/DOSE) (OZEMPIC) 4 MG/3M*3 mL   0            Sig: Inject 1 mg Subcutaneous every 7 days

## 2023-10-04 ENCOUNTER — LAB (OUTPATIENT)
Dept: LAB | Facility: CLINIC | Age: 53
End: 2023-10-04
Payer: COMMERCIAL

## 2023-10-04 DIAGNOSIS — I10 ESSENTIAL HYPERTENSION: ICD-10-CM

## 2023-10-04 LAB
ANION GAP SERPL CALCULATED.3IONS-SCNC: 10 MMOL/L (ref 7–15)
BUN SERPL-MCNC: 10.7 MG/DL (ref 6–20)
CALCIUM SERPL-MCNC: 9.5 MG/DL (ref 8.6–10)
CHLORIDE SERPL-SCNC: 103 MMOL/L (ref 98–107)
CREAT SERPL-MCNC: 0.95 MG/DL (ref 0.67–1.17)
DEPRECATED HCO3 PLAS-SCNC: 26 MMOL/L (ref 22–29)
EGFRCR SERPLBLD CKD-EPI 2021: >90 ML/MIN/1.73M2
GLUCOSE SERPL-MCNC: 187 MG/DL (ref 70–99)
POTASSIUM SERPL-SCNC: 3.8 MMOL/L (ref 3.4–5.3)
SODIUM SERPL-SCNC: 139 MMOL/L (ref 135–145)

## 2023-10-04 PROCEDURE — 36415 COLL VENOUS BLD VENIPUNCTURE: CPT

## 2023-10-04 PROCEDURE — 80048 BASIC METABOLIC PNL TOTAL CA: CPT

## 2023-10-25 ENCOUNTER — OFFICE VISIT (OUTPATIENT)
Dept: OPTOMETRY | Facility: CLINIC | Age: 53
End: 2023-10-25
Payer: COMMERCIAL

## 2023-10-25 DIAGNOSIS — H18.613 KERATOCONUS, STABLE, BILATERAL: Primary | ICD-10-CM

## 2023-10-25 DIAGNOSIS — Z94.7 POST CORNEAL TRANSPLANT: ICD-10-CM

## 2023-10-25 ASSESSMENT — VISUAL ACUITY
OS_CC: 20/60-1
METHOD: SNELLEN - LINEAR
CORRECTION_TYPE: CONTACTS
OD_SC: 20/200

## 2023-10-25 ASSESSMENT — REFRACTION_CURRENTRX
OD_ADDL_SPECS: BOSTON XO2 CLEAR
OD_BRAND: ZENLENS PROLATE
OD_DIAMETER: 16.0
OS_SPHERE: -17.00
OS_CYLINDER: -1.50
OS_DIAMETER: 17.0
OD_BRAND: ZENLENS OBLATE
OD_DIAMETER: 17.0
OD_SPHERE: -2.00
OS_ADDL_SPECS: BOSTON XO BLUE
OS_AXIS: 180
OS_BRAND: ZENLENS PROLATE
OD_SPHERE: -11.50

## 2023-10-25 ASSESSMENT — TONOMETRY
OD_IOP_MMHG: 10
OS_IOP_MMHG: 05
IOP_METHOD: ICARE

## 2023-10-25 ASSESSMENT — SLIT LAMP EXAM - LIDS
COMMENTS: NORMAL
COMMENTS: NORMAL

## 2023-10-25 NOTE — PROGRESS NOTES
A/P  1.) Keratoconus OU, s/p PKP right eye  -Previously in scleral lens OU fit in [place]    Right eye:  -s/p PKP 04/2023  -BCVA today 20/40+ with hard lens OR  -Per pt may need repeat surgeries in the future. Hold on any lens wear at this time, proceed when cornea okay's    Left eye:  -High/degnerative myopia and DONNIE limiting acuity. BCVA approx 20/50-20/60 range.   -Does take small OR to improve back to 20/50  -Somewhat high limbal vault but pt and cornea tolerating well. Leave fit as is for now    Order adjusted left lens and mail direct. Follow-up prn per cornea for right eye, otherwise 1 year for left eye    Contact Lens Billing  V-Code:  - GP scleral  Final Contact Lens Rx         Brand Base Curve Diameter Sphere Cylinder Axis Lens Addl. Specs    Right            Left Zenlens Prolate 5.5sag, 6.5bc 17.0 -17.75 -1.50 180 -75um LC, 3 flat H x 1 steep V, 50um dec LC [place] XO blue           # of units: 1 left lens  Price per Unit: $225    This patient requires contact lenses that are medically necessary for either improvement in vision over spectacles, support of the ocular surface, or other therapeutic benefit. These are not cosmetic contact lenses.     Encounter Diagnoses   Name Primary?    Keratoconus, stable, bilateral Yes    Post corneal transplant

## 2023-10-27 ENCOUNTER — MYC REFILL (OUTPATIENT)
Dept: OPTOMETRY | Facility: CLINIC | Age: 53
End: 2023-10-27

## 2023-10-27 DIAGNOSIS — H18.603 KERATOCONUS OF BOTH EYES: ICD-10-CM

## 2023-10-27 RX ORDER — SODIUM CHLORIDE FOR INHALATION 0.9 %
3 VIAL, NEBULIZER (ML) INHALATION 2 TIMES DAILY
Qty: 300 ML | Refills: 11 | Status: SHIPPED | OUTPATIENT
Start: 2023-10-27

## 2023-11-14 ENCOUNTER — OFFICE VISIT (OUTPATIENT)
Dept: OPHTHALMOLOGY | Facility: CLINIC | Age: 53
End: 2023-11-14
Attending: OPHTHALMOLOGY
Payer: COMMERCIAL

## 2023-11-14 DIAGNOSIS — H52.213 IRREGULAR ASTIGMATISM OF BOTH EYES: ICD-10-CM

## 2023-11-14 DIAGNOSIS — Z98.890 POSTOPERATIVE EYE STATE: ICD-10-CM

## 2023-11-14 DIAGNOSIS — Q12.0: ICD-10-CM

## 2023-11-14 DIAGNOSIS — H21.531 IRIDODIALYSIS OF RIGHT EYE: Primary | ICD-10-CM

## 2023-11-14 PROCEDURE — 99213 OFFICE O/P EST LOW 20 MIN: CPT | Performed by: OPHTHALMOLOGY

## 2023-11-14 PROCEDURE — 99214 OFFICE O/P EST MOD 30 MIN: CPT | Performed by: OPHTHALMOLOGY

## 2023-11-14 RX ORDER — ERYTHROMYCIN 5 MG/G
0.5 OINTMENT OPHTHALMIC 2 TIMES DAILY
Qty: 7 G | Refills: 4 | Status: SHIPPED | OUTPATIENT
Start: 2023-11-14 | End: 2024-06-24

## 2023-11-14 ASSESSMENT — CONF VISUAL FIELD
OS_NORMAL: 1
METHOD: COUNTING FINGERS
OD_SUPERIOR_NASAL_RESTRICTION: 3
OS_INFERIOR_TEMPORAL_RESTRICTION: 0
OS_SUPERIOR_TEMPORAL_RESTRICTION: 0
OS_SUPERIOR_NASAL_RESTRICTION: 0
OS_INFERIOR_NASAL_RESTRICTION: 0

## 2023-11-14 ASSESSMENT — VISUAL ACUITY
OD_SC+: -1
OS_CC: 20/50
OS_CC+: -2
METHOD: SNELLEN - LINEAR
OD_SC: 20/100
OS_PH_CC+: -2
OS_PH_CC: 20/30
CORRECTION_TYPE: CONTACTS

## 2023-11-14 ASSESSMENT — PACHYMETRY
OD_CT(UM): 599
OS_CT(UM): 299

## 2023-11-14 ASSESSMENT — TONOMETRY
IOP_METHOD: ICARE
OD_IOP_MMHG: 9
OS_IOP_MMHG: 9

## 2023-11-14 ASSESSMENT — SLIT LAMP EXAM - LIDS
COMMENTS: NORMAL
COMMENTS: NORMAL

## 2023-11-14 NOTE — NURSING NOTE
Chief Complaints and History of Present Illnesses   Patient presents with    Keratoconus Follow Up     Chief Complaint(s) and History of Present Illness(es)       Keratoconus Follow Up              Laterality: both eyes              Comments    Jiménez is here to follow up on keratoconus both eyes. History of corneal transplant RE.   Today he states eyes feel good and vision is about the same as last visit

## 2023-11-14 NOTE — PROGRESS NOTES
HPI: Mr. Gleason was born in 1970 with Hx of keratoconus. right eye corneal transplant in 2010 - surgeon unknown by pt.. Now right eye can not wear CL without corneal edema developing.   Pt started to wear glasses at age 5.  Pt states that left eye has changed yearly.  No old records available.    Interval 7/11/23: POM3 S/P right eye PKP. Doing well. No new changes in vision or new concerns. Right eye is comfortable. Using drops and ointment.     CL: scleral lenses left eye   Can wear CL right eye due to corneal edema developing.   Glasses - none  Meds:    - Prednisolone QID right eye ( pt has been only using tid).. Pt told to use qid.   - Erythromycin ointment QID OD  Surgery : right eye PKP 2009 & 2010.    A/P:  # Keratoconus each eye  # History of right eye PKPx2 with failing graft  # S/p right eye attempted DMEK 3/29/23, aborted due to obstructed view from corneal edema (see OP note).  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 Disc possible need for pupiloplasty, disc r, b and PC for future surgery  - right eye cataract    PLAN:  - Cont. pred forte QID right eye.   - continue erythromycin qid    RTC:  4 months [place]- call sooner with any problems     Mick Paul MD    Attending Physician Attestation:  Complete documentation of historical and exam elements from today's encounter can be found in the full encounter summary report (not reduplicated in this progress note).  I personally obtained the chief complaint(s) and history of present illness.  I confirmed and edited as necessary the review of systems, past medical/surgical history, family history, social history, and examination findings as documented by others; and I examined the patient myself.  I personally reviewed the relevant tests, images, and reports as documented above.  I formulated and edited as necessary the assessment and plan and discussed the findings  and management plan with the patient and family. - iMck Paul MD

## 2023-12-01 ENCOUNTER — OFFICE VISIT (OUTPATIENT)
Dept: FAMILY MEDICINE | Facility: CLINIC | Age: 53
End: 2023-12-01
Payer: COMMERCIAL

## 2023-12-01 VITALS
BODY MASS INDEX: 44.1 KG/M2 | DIASTOLIC BLOOD PRESSURE: 88 MMHG | HEIGHT: 71 IN | SYSTOLIC BLOOD PRESSURE: 140 MMHG | TEMPERATURE: 97.6 F | WEIGHT: 315 LBS | OXYGEN SATURATION: 97 % | HEART RATE: 74 BPM | RESPIRATION RATE: 16 BRPM

## 2023-12-01 DIAGNOSIS — E11.65 TYPE 2 DIABETES MELLITUS WITH HYPERGLYCEMIA, WITHOUT LONG-TERM CURRENT USE OF INSULIN (H): Primary | ICD-10-CM

## 2023-12-01 DIAGNOSIS — I10 ESSENTIAL HYPERTENSION: ICD-10-CM

## 2023-12-01 LAB — HBA1C MFR BLD: 7.4 % (ref 0–5.6)

## 2023-12-01 PROCEDURE — 99214 OFFICE O/P EST MOD 30 MIN: CPT | Performed by: NURSE PRACTITIONER

## 2023-12-01 PROCEDURE — 83036 HEMOGLOBIN GLYCOSYLATED A1C: CPT | Performed by: NURSE PRACTITIONER

## 2023-12-01 PROCEDURE — 36415 COLL VENOUS BLD VENIPUNCTURE: CPT | Performed by: NURSE PRACTITIONER

## 2023-12-01 RX ORDER — LISINOPRIL 40 MG/1
40 TABLET ORAL DAILY
Qty: 90 TABLET | Refills: 3 | Status: SHIPPED | OUTPATIENT
Start: 2023-12-01

## 2023-12-01 ASSESSMENT — PAIN SCALES - GENERAL: PAINLEVEL: NO PAIN (0)

## 2023-12-01 NOTE — COMMUNITY RESOURCES LIST (ENGLISH)
12/01/2023   [facility] - Outpatient Clinics  N/A  For additional resource needs, please contact your health insurance member services or your primary care team.  Phone: [phone]   Email: N/A   Address: [facility] [place] [address]   Hours: N/A        Financial Stability       Rent and mortgage payment assistance  1  [facility] - Rent payment assistance Distance: 0.26 miles      In-Person, Phone/Virtual   [address] [place] [address]  Language: English  Hours: Mon - Fri 8:00 AM - 4:30 PM  Fees: Free   Phone: (299) 324-8259 Email: tk@[facility]. Website: https://www.[facility]./Facilities/Facility/Details/23     2  [employer] Distance: 2.42 miles      In-Person, Phone/Virtual   [address] [place] [address]  Language: English  Hours: Mon - Sun Appt. Only  Fees: Free   Phone: (834) 556-7234 Email: ramakrishna@[employer] Website: http://www.communityhelpinghand.org          Food and Nutrition       Food pantry  3  [facility] Food Pantry Distance: 1.85 miles      Pickup   [address] [place] [address]  Language: English  Hours: Mon - Fri 9:00 AM - 3:00 PM  Fees: Free   Phone: (854) 365-5356 Email: office@[facility].[place] Website: http://www.Antavo.net/     4  [employer] Food Shelf - [place] Distance: 1.99 miles      Pickup   [address] [place] [address]  Language: English  Hours: Mon - Tue 9:00 AM - 5:00 PM , Thu 9:00 AM - 5:00 PM , Sat 9:00 AM - 12:00 PM  Fees: Free   Phone: (105) 796-1809 Ext.112 Email: perfecto@[employer].org Website: http://www.BeautyStat.com.org     SNAP application assistance  5  [employer] [place] Distance: 20.25 miles      Phone/Virtual   [address] [address]  Language: English, Hmong, Citizen of [place], Cambodian, Divehi  Hours: Mon - Fri 8:30 AM - 4:30 PM  Fees: Free   Phone:  (855) 474-4522 Email: helpline@hungersoAirship Venturesions.org Website: https://www.hungersolutions.org/programs/[place]-food-helpline/     6  [facility] Distance: 0.26 miles      In-Person, Phone/Virtual   [address] [facility] [place] [address]  Language: English  Hours: Mon - Fri 8:00 AM - 4:30 PM  Fees: Free   Phone: (905) 531-6469 Email: tk@[facility]. Website: https://www.[facility]./Facilities/Facility/Details/23     Soup kitchen or free meals  7  [facility] - Homeless Outreach Services Team - Food Assistance Distance: 12.69 miles      Pickup   [address] [place] [address]  Language: English  Hours: Mon - Thu 9:30 AM - 3:30 PM  Fees: Free   Phone: (616) 752-5482 Email: office@[facility] Website: https://www.saintandrewsBloggersBase/community-resource-center/     8  [facility] Resource [place] - Thursday Night Community Meal Distance: 12.8 miles      In-Person   [address] [place] [address]  Language: English, Sammarinese  Hours: Thu 6:00 PM - 7:00 PM  Fees: Free   Phone: (233) 601-9635 Email: center@[facility]s.[facility] Website: https://www.saintandrews.Crowdfynd/community-resource-center/          Transportation       Free or low-cost transportation  9  [facility] Bus Loop - Free or low-cost transportation Distance: 13.1 miles      In-Person   [address] [place] [address]  Language: English  Hours: Mon - Fri 9:00 AM - 5:00 PM  Fees: Free   Phone: (736) 552-4743 Email: info@[employer] Website: https://www.Axeda/     10  [employer] Distance: 21.54 miles      In-Person   [address] [place] [address]  Language: English, Sammarinese  Hours: Mon 9:00 AM - 5:00 PM , Tue 9:30 AM - 7:00 PM , Wed 9:00 AM - 5:00 PM , Thu 9:30 AM - 7:00 PM , Fri 9:00 AM - 5:00 PM  Fees: Free   Phone: (991) 394-9173 Email: contactus@[employer].Crowdfynd Website: http://www.[facility].org      Transportation to medical appointments  11  [employer] Distance: 16.77 miles      In-Person   [address] [address]  Language: English  Hours: Mon - Thu 6:00 AM - 6:00 PM , Fri 6:00 AM - 5:00 PM  Fees: Insurance, Self Pay   Phone: (367) 489-9905 Email: office@[facility] Website: https://www.[facility]/     12  [facility] ([facility]IFW) Distance: 18.96 miles      In-Person   [address] [place] [address]  Language: Tajik, Urdu, English, Gujarati, Cony, Syriac, Romansh, Portuguese, Lao, Uzbek  Hours: Mon - Wed 9:00 AM - 5:00 PM , Thu 12:00 PM - 6:00 PM , Fri 9:00 AM - 5:00 PM , Sun 10:30 AM - 2:00 PM Appt. Only  Fees: Free   Phone: (484) 557-7581 Email: info@[employer]-aifw.org Website: https://www.sewa-aifw.org/          Important Numbers & Websites       [address] Streetited[address].org  Poison Control   (740) 581-5058 [place]  Suicide and Crisis Lifeline   [address] [place], [place] [address]line.org  Childhelp [place] Child Abuse Hotline   850.654.3303 Childhelphotline.org  [place] Sexual Assault Hotline   (405) 672-7461 (HOPE) Rainn.org  National Runaway Safeline   (250) 523-8032 (RUNAWAY) [facility] Hospital[facility]away.org  Pregnancy & Postpartum Support [place]   Call/text 333-600-4558 Ppsupportmn.org  Substance Abuse National Helpline ([facility]   069-749-HELP (6862) Findtreatment.gov  Emergency Services   911

## 2023-12-01 NOTE — COMMUNITY RESOURCES LIST (ENGLISH)
12/01/2023   [facility]  N/A  For questions about this resource list or additional care needs, please contact your primary care clinic or care manager.  Phone: [phone]   Email: N/A   Address: [address] [place] [address]   Hours: N/A        Financial Stability       Rent and mortgage payment assistance  1  [facility] - Rent payment assistance Distance: 0.26 miles      In-Person, Phone/Virtual   [address] [place] [address]  Language: English  Hours: Mon - Fri 8:00 AM - 4:30 PM  Fees: Free   Phone: (969) 789-6961 Email: tk@[facility]. Website: https://www.[facility]./Facilities/Facility/Details/23     2  [employer] Distance: 2.42 miles      In-Person, Phone/Virtual   [address] [place] [address]  Language: English  Hours: Mon - Sun Appt. Only  Fees: Free   Phone: (453) 630-6663 Email: ramakrishna@[employer] Website: http://www.communityhelpinghand.org          Food and Nutrition       Food pantry  3  [facility] Food Pantry Distance: 1.85 miles      Pickup   [address], [place] [address]  Language: English  Hours: Mon - Fri 9:00 AM - 3:00 PM  Fees: Free   Phone: (621) 403-3469 Email: office@[facility].[employer] Website: http://www.My-wardrobe.com.net/     4  [employer] Food Shelf - [place] Distance: 1.99 miles      Pickup   [address] [place] [address]  Language: English  Hours: Mon - Tue 9:00 AM - 5:00 PM , Thu 9:00 AM - 5:00 PM , Sat 9:00 AM - 12:00 PM  Fees: Free   Phone: (548) 159-6763 Ext.112 Email: perfecto@[employer].org Website: http://www.[employer] [place].org     SNAP application assistance  5  [facility] Distance: 0.26 miles      In-Person, Phone/Virtual   [address] [place] [address]  Language: English  Hours: Mon - Fri 8:00 AM - 4:30 PM  Fees: Free   Phone:  (917) 990-1437 Email: tk@[facility]. Website: https://www.[facility]/Facilities/Facility/Details/23     6  [facility] Services - Economic Support - [place] Distance: 16.8 miles      In-Person, Phone/Virtual   [address] [place] [address]  Language: English  Hours: Mon - Fri 8:00 AM - 4:30 PM  Fees: Free   Phone: (771) 482-5267 Email: tk@[facility] Website: https://www.[facility]Zoom [facility]ics[facility]/787/Economic-Support     Soup kitchen or free meals  7  [facility] - Homeless Outreach Services Team - Food Assistance Distance: 12.69 miles      Pickup   [address] [place] [address]  Language: English  Hours: Mon - Thu 9:30 AM - 3:30 PM  Fees: Free   Phone: (481) 361-2972 Email: office@[facility]aintandrews[employer]Campus Website: https://www.saintandrewsGoCrossCampus/community-resource-center/     8  [facility] acute [facility] - Thursday Night Community Meal Distance: 12.8 miles      In-Person   [address] [place] [address]  Language: English, Yoruba  Hours: Thu 6:00 PM - 7:00 PM  Fees: Free   Phone: (345) 817-6869 Email: center@saintandrews.[employer] Website: https://www.saintandrews.Songza/community-resource-center/          Transportation       Free or low-cost transportation  9  [facility] Bus Loop - Free or low-cost transportation Distance: 13.1 miles      In-Person   [address] [place] [address]  Language: English  Hours: Mon - Fri 9:00 AM - 5:00 PM  Fees: Free   Phone: (114) 148-3789 Email: info@[employer] Website: https://www.Ofuz/     10  [employer] Distance: 21.54 miles      In-Person   [address] [place] [address]  Language: English, Yoruba  Hours: Mon 9:00 AM - 5:00 PM , Tue 9:30 AM - 7:00 PM , Wed 9:00 AM - 5:00 PM , Thu 9:30 AM - 7:00 PM , Fri 9:00 AM - 5:00 PM  Fees: Free   Phone: (968) 626-6139 Email: contactus@[employer] Website:  http://www.smallms.org     Transportation to medical appointments  11  [employer] Ride Distance: 16.77 miles      In-Person   [address] [address]  Language: English  Hours: Mon - Thu 6:00 AM - 6:00 PM , Fri 6:00 AM - 5:00 PM  Fees: Insurance, Self Pay   Phone: (198) 183-5948 Email: office@[employer] Website: https://www.[employer]/     12  [facility]   Family Wellness (AIFW) Distance: 18.96 miles      In-Person   [address] [place] [address]  Language: English, Luxembourgish, English, Gujarati, Cony, Armenian, Kiswahili, Nepali, Greenlandic, Occitan  Hours: Mon - Wed 9:00 AM - 5:00 PM , Thu 12:00 PM - 6:00 PM , Fri 9:00 AM - 5:00 PM , Sun 10:30 AM - 2:00 PM Appt. Only  Fees: Free   Phone: (148) 447-2288 Email: info@[employer]-aifw.org Website: https://www.sewa-aifw.org/          Important Numbers & Websites       Emergency Services   911  City Services   311  Poison Control   (442) 452-2159  Suicide Prevention Lifeline   (251) 675-6087 (TALK)  Child Abuse Hotline   (397) 583-9972 (4-A-Child)  Sexual Assault Hotline   (440) 582-9636 (HOPE)  National Runaway Safeline   (700) 841-2928 (RUNAWAY)  All-Options Talkline   (428) 548-1224  Substance Abuse Referral   (318) 745-3227 (HELP)

## 2023-12-01 NOTE — PATIENT INSTRUCTIONS
Call [phone] to schedule your colonoscopy.  A1C today and I will call you or send HydroLogex message with results and plan.  Increase your lisinopril to 40 mg (2 tabs with what you have at home).  I did refill 40 mg tabs so you can go back to 1 tab once you pick this up.   Make appointment with nursing in 1 week or send me a BP from home on HydroLogex in a week for recheck BP on increase in medication.  Follow-up in 1 month with lab appointment for electrolytes and kidney function.

## 2023-12-01 NOTE — PROGRESS NOTES
Assessment & Plan     Type 2 diabetes mellitus with hyperglycemia, without long-term current use of insulin (H)  Currently taking metformin 1,000 mg daily and ozempic 1 mg weekly. Last Hgb A1C 7.8% in July 2023. Refilled ozempic and ordered Hgb A1C to check today.  - Hemoglobin A1c; Future  - Semaglutide, 1 MG/DOSE, (OZEMPIC) 4 MG/3ML pen; Inject 1 mg Subcutaneous every 7 days    BMI 45.0-49.9, adult (H)  Patient reports he has had some weight loss since starting ozempic.  This has only been about 3 lbs since last clinic visit.      Essential hypertension  Taking 10 mg amlodipine daily, metoprolol XR 50 mg BID, and lisinopril 20 mg daily. BP still elevated at 140/88 today.  Increased lisinopril to 40 mg daily and ordered follow-up BMP in one month to check electrolytes and kidney function after dose increase.  Advised follow-up with nursing or MyChart message with BP in 1 week for recheck as well.  - lisinopril (ZESTRIL) 40 MG tablet; Take 1 tablet (40 mg) by mouth daily  - Basic metabolic panel  (Ca, Cl, CO2, Creat, Gluc, K, Na, BUN); Future    See Patient Instructions    Nikki GRIGGS I, Thea Olvera, was present with the NP student who participated in the service and in the documentationof the note.   I have verified the history and personally performed the physical exam and medical decision making.  I agree with the assessment and plan of care as documented in the note.     Thea Olvera NP on 12/1/2023 at 12:59 PM    [facility] PORTER Jiménez is a 53 year old, presenting for the following health issues:  Diabetes, Hypertension, and Lipids    He has no other concerns about his health.        12/1/2023    10:15 AM   Additional Questions   Roomed by rmb   Accompanied by self         12/1/2023    10:15 AM   Patient Reported Additional Medications   Patient reports taking the following new medications none       History of Present Illness       Diabetes:   He  presents for follow up of diabetes.  He is checking home blood glucose a few times a month.   He checks blood glucose before meals.  Blood glucose is never over 200 and never under 70. He is aware of hypoglycemia symptoms including none.    He has no concerns regarding his diabetes at this time.   He is not experiencing numbness or burning in feet, excessive thirst, blurry vision, weight changes or redness, sores or blisters on feet. The patient has not had a diabetic eye exam in the last 12 months.          Hypertension: He presents for follow up of hypertension.  He does not check blood pressure  regularly outside of the clinic. Outside blood pressures have been over 140/90. He does not follow a low salt diet.     He eats 0-1 servings of fruits and vegetables daily.He consumes 1 sweetened beverage(s) daily.He exercises with enough effort to increase his heart rate 30 to 60 minutes per day.  He exercises with enough effort to increase his heart rate 5 days per week. He is missing 1 dose(s) of medications per week.    BG at home has been 120-140.  Sees the eye doctor routinely, has had corneal transplant, he has had a dilated eye exam within the past year - last done 10/25/2023.  He has a blood pressure cuff at home but does not check his blood pressure regularly.     Hyperlipidemia Follow-Up    Are you regularly taking any medication or supplement to lower your cholesterol?   Yes- atorvastatin 40 mg daily  Are you having muscle aches or other side effects that you think could be caused by your cholesterol lowering medication?  No    Review of Systems   CONSTITUTIONAL:NEGATIVE for fever, chills, change in weight  RESP:NEGATIVE for significant cough or SOB  CV: NEGATIVE for chest pain, palpitations or peripheral edema  MUSCULOSKELETAL: NEGATIVE for significant arthralgias or myalgia  PSYCHIATRIC: NEGATIVE for changes in mood or affect      Objective    BP (!) 140/88 (BP Location: Left arm, Patient Position: Sitting,  "Cuff Size: Adult Large)   Pulse 74   Temp 97.6  F (36.4  C) (Tympanic)   Resp 16   Ht 1.803 m (5' 11\")   Wt 148.8 kg (328 lb)   SpO2 97%   BMI 45.75 kg/m    Body mass index is 45.75 kg/m .  Physical Exam   GENERAL: healthy, alert and no distress  RESP: lungs clear to auscultation - no rales, rhonchi or wheezes  CV: regular rate and rhythm, normal S1 S2, no S3 or S4, no murmur, click or rub, no peripheral edema and peripheral pulses strong  MS: no gross musculoskeletal defects noted, no edema  PSYCH: mentation appears normal, affect normal/bright    Lab on 10/04/2023   Component Date Value Ref Range Status    Sodium 10/04/2023 139  135 - 145 mmol/L Final    Reference intervals for this test were updated on 09/26/2023 to more accurately reflect our healthy population. There may be differences in the flagging of prior results with similar values performed with this method. Interpretation of those prior results can be made in the context of the updated reference intervals.     Potassium 10/04/2023 3.8  3.4 - 5.3 mmol/L Final    Chloride 10/04/2023 103  98 - 107 mmol/L Final    Carbon Dioxide (CO2) 10/04/2023 26  22 - 29 mmol/L Final    Anion Gap 10/04/2023 10  7 - 15 mmol/L Final    Urea Nitrogen 10/04/2023 10.7  6.0 - 20.0 mg/dL Final    Creatinine 10/04/2023 0.95  0.67 - 1.17 mg/dL Final    GFR Estimate 10/04/2023 >90  >60 mL/min/1.73m2 Final    Calcium 10/04/2023 9.5  8.6 - 10.0 mg/dL Final    Glucose 10/04/2023 187 (H)  70 - 99 mg/dL Final     Results for orders placed or performed in visit on 12/01/23 (from the past 24 hour(s))   Hemoglobin A1c   Result Value Ref Range    Hemoglobin A1C 7.4 (H) 0.0 - 5.6 %                     "

## 2023-12-29 ENCOUNTER — HOSPITAL ENCOUNTER (EMERGENCY)
Facility: CLINIC | Age: 53
Discharge: HOME | End: 2023-12-29
Attending: EMERGENCY MEDICINE | Admitting: EMERGENCY MEDICINE
Payer: COMMERCIAL

## 2023-12-29 ENCOUNTER — PATIENT OUTREACH (OUTPATIENT)
Dept: FAMILY MEDICINE | Facility: CLINIC | Age: 53
End: 2023-12-29
Payer: COMMERCIAL

## 2023-12-29 VITALS
TEMPERATURE: 98.1 F | DIASTOLIC BLOOD PRESSURE: 90 MMHG | RESPIRATION RATE: 18 BRPM | OXYGEN SATURATION: 98 % | HEART RATE: 75 BPM | SYSTOLIC BLOOD PRESSURE: 167 MMHG

## 2023-12-29 DIAGNOSIS — M70.52 SUPRAPATELLAR BURSITIS OF LEFT KNEE: ICD-10-CM

## 2023-12-29 PROCEDURE — 96372 THER/PROPH/DIAG INJ SC/IM: CPT | Performed by: EMERGENCY MEDICINE

## 2023-12-29 PROCEDURE — 99284 EMERGENCY DEPT VISIT MOD MDM: CPT | Mod: 25 | Performed by: EMERGENCY MEDICINE

## 2023-12-29 PROCEDURE — 99283 EMERGENCY DEPT VISIT LOW MDM: CPT | Performed by: EMERGENCY MEDICINE

## 2023-12-29 PROCEDURE — 250N000011 HC RX IP 250 OP 636: Performed by: EMERGENCY MEDICINE

## 2023-12-29 RX ORDER — KETOROLAC TROMETHAMINE 30 MG/ML
60 INJECTION, SOLUTION INTRAMUSCULAR; INTRAVENOUS ONCE
Status: COMPLETED | OUTPATIENT
Start: 2023-12-29 | End: 2023-12-29

## 2023-12-29 RX ADMIN — KETOROLAC TROMETHAMINE 60 MG: 30 INJECTION, SOLUTION INTRAMUSCULAR at 06:36

## 2023-12-29 NOTE — LETTER
December 29, 2023      To Whom It May Concern:      Tino Gleason was seen in our Emergency Department today, 12/29/23.  I expect his condition to improve over the next 7-10 days.  He may return to work in 3 to 4 days, recommend no kneeling for at least 7-10 days    Sincerely,        Raheem Balbuena MD

## 2023-12-29 NOTE — TELEPHONE ENCOUNTER
"ED / Discharge Outreach Protocol    Patient Contact    Attempt # 1    Was call answered?  Yes.  \"May I please speak with Jiménez?\"  Is patient available?   Yes       ED for acute condition Discharge Protocol    \"Hi, my name is Ashlee Cooper RN, a registered nurse, and I am calling from [facility].  I am calling to follow up and see how things are going for you after your recent emergency visit.\"    Tell me how you are doing now that you are home?\" Pain and swelling in left knee.      Discharge Instructions    \"Let's review your discharge instructions.  What is/are the follow-up recommendations?  Pt. Response: Follow up with PCP    \"Has an appointment with your primary care provider been scheduled?\"  Yes. (confirm and remind to bring meds)    Medications    \"Tell me what changed about your medicines when you discharged?\"    No.    \"What questions do you have about your medications?\"   None        Call Summary    \"What questions or concerns do you have about your recent visit and your follow-up care?\"     none    \"If you have questions or things don't continue to improve, we encourage you contact us through the main clinic number (give number).  Even if the clinic is not open, triage nurses are available 24/7 to help you.     We would like you to know that our clinic has extended hours (provide information).  We also have urgent care (provide details on closest location and hours/contact info)\"    \"Thank you for your time and take care!\"    Ashlee NUGENT RN  [facility]  [phone]      "

## 2023-12-29 NOTE — ED PROVIDER NOTES
History     Chief Complaint   Patient presents with    Leg Pain    Knee Pain     HPI  Tino Gleason is a 53 year old male who presents with left knee pain that began a couple days ago.  He kneels frequently at work.  He denies associated fever or redness.  He is able to bear weight, pain is worse with flexion/extension, localized suprapatellar.  He is not has a symptoms in the past.  He does have distant history of gout.    Allergies:  Allergies   Allergen Reactions    Hydrochlorothiazide      Gout flares    Hydrocodone-Acetaminophen Hives and Swelling    Naproxen GI Disturbance     Other reaction(s): GI Upset       Problem List:    Patient Active Problem List    Diagnosis Date Noted    BMI 45.0-49.9, adult (H) 12/01/2023     Priority: Medium    Essential hypertension 04/27/2023     Priority: Medium    Type 2 diabetes mellitus with hyperglycemia, without long-term current use of insulin (H) 04/27/2023     Priority: Medium    Gout 04/27/2023     Priority: Medium    Postoperative eye state 03/30/2023     Priority: Medium     Added automatically from request for surgery [id number]      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     Priority: Medium     Added automatically from request for surgery [id number]          Past Medical History:    Past Medical History:   Diagnosis Date    Diabetes mellitus, type 2 (H)     HTN (hypertension)     Hyperlipidemia LDL goal <70     Stable keratoconus of both eyes        Past Surgical History:    Past Surgical History:   Procedure Laterality Date    KERATOPLASTY DESCEMETS MEMBRANE ENDOTHELIAL (DMEK) Right 3/29/2023    Procedure: ATTEMPTED DESCEMET'S MEMBRANE ENDOTHELIAL KERATOPLASTY (DMEK) RIGHT, VIEW OBSCURED, PROCEDURE ABORTED.;  Surgeon: Mick Paul MD;  Location:  OR    [facility],CORNEAL TRANSPLANT         Family History:    Family History   Problem Relation Age of Onset    Hypertension Mother     Hyperlipidemia Mother     Hypertension Brother      Hypertension Brother     Heart Failure Maternal Grandmother     Diabetes Maternal Grandfather     Liver Cancer Paternal Grandmother     Glaucoma No family hx of     Macular Degeneration No family hx of        Social History:  Marital Status:   [2]  Social History     Tobacco Use    Smoking status: Never    Smokeless tobacco: Never   Vaping Use    Vaping Use: Never used   Substance Use Topics    Alcohol use: Yes     Alcohol/week: 10.0 standard drinks of alcohol     Types: 10 Standard drinks or equivalent per week     Comment: occasional    Drug use: Never        Medications:    alcohol swab prep pads  amLODIPine (NORVASC) 10 MG tablet  atorvastatin (LIPITOR) 40 MG tablet  blood glucose (NO BRAND SPECIFIED) test strip  blood glucose monitoring (NO BRAND SPECIFIED) meter device kit  erythromycin (ROMYCIN) 5 MG/GM ophthalmic ointment  fluticasone (FLONASE) 50 MCG/ACT nasal spray  lisinopril (ZESTRIL) 40 MG tablet  metFORMIN (GLUCOPHAGE XR) 500 MG 24 hr tablet  metoprolol succinate ER (TOPROL XL) 50 MG 24 hr tablet  prednisoLONE acetate (PRED FORTE) 1 % ophthalmic suspension  Semaglutide, 1 MG/DOSE, (OZEMPIC) 4 MG/3ML pen  Semaglutide, 2 MG/DOSE, (OZEMPIC) 8 MG/3ML pen  sodium chloride 0.9 % neb solution  tadalafil (CIALIS) 2.5 MG tablet  thin (NO BRAND SPECIFIED) lancets          Review of Systems    Physical Exam          Physical Exam  Left knee exam shows suprapatellar swelling with associated tenderness, patella is nontender, patellar tendon is intact, there is no joint line tenderness at the knee and no redness or warmth                Procedures         No results found for this or any previous visit (from the past 24 hour(s)).    Medications - No data to display    Assessments & Plan (with Medical Decision Making)  Left knee pain and swelling, findings consistent with suprapatellar bursitis, usual differential considered.  No clinical evidence for or risk factor for septic joint.  Recommend avoiding  kneeling, range of motion as tolerated, ice, ibuprofen/acetaminophen, follow-up primary care, return criteria reviewed.     I have reviewed the nursing notes.    I have reviewed the findings, diagnosis, plan and need for follow up with the patient.        New Prescriptions    No medications on file       Final diagnoses:   Suprapatellar bursitis of left knee       12/29/2023   [facility] EMERGENCY DEPT       Raheem Balbuena MD  12/29/23 0617

## 2023-12-29 NOTE — DISCHARGE INSTRUCTIONS
Ice, Ace wrap, ibuprofen 800 mg and acetaminophen 1000 mg every 6-8 hours    Make follow-up appointment next week with primary care    Return for redness fever worsening symptoms

## 2023-12-29 NOTE — ED TRIAGE NOTES
Pt reported left knee pain, pt unable to bend knee. Has hx of gout. Denied trauma to left leg and knee. Pt able to bare weight.      Triage Assessment (Adult)       Row Name 12/29/23 0548          Triage Assessment    Airway WDL WDL        Respiratory WDL    Respiratory WDL WDL        Skin Circulation/Temperature WDL    Skin Circulation/Temperature WDL WDL        Cardiac WDL    Cardiac WDL WDL        Peripheral/Neurovascular WDL    Peripheral Neurovascular WDL WDL        Cognitive/Neuro/Behavioral WDL    Cognitive/Neuro/Behavioral WDL WDL

## 2023-12-29 NOTE — ED NOTES
Writer went to discharge pt. Pt requested pain medication as he has been using OTC medications with no improvement. Writer spoke to Dr. Balbuena and requested Toradol.

## 2024-01-03 ENCOUNTER — LAB (OUTPATIENT)
Dept: LAB | Facility: CLINIC | Age: 54
End: 2024-01-03
Payer: COMMERCIAL

## 2024-01-03 DIAGNOSIS — I10 ESSENTIAL HYPERTENSION: ICD-10-CM

## 2024-01-03 LAB
ANION GAP SERPL CALCULATED.3IONS-SCNC: 11 MMOL/L (ref 7–15)
BUN SERPL-MCNC: 20.8 MG/DL (ref 6–20)
CALCIUM SERPL-MCNC: 9.7 MG/DL (ref 8.6–10)
CHLORIDE SERPL-SCNC: 102 MMOL/L (ref 98–107)
CREAT SERPL-MCNC: 1.18 MG/DL (ref 0.67–1.17)
DEPRECATED HCO3 PLAS-SCNC: 25 MMOL/L (ref 22–29)
EGFRCR SERPLBLD CKD-EPI 2021: 74 ML/MIN/1.73M2
GLUCOSE SERPL-MCNC: 210 MG/DL (ref 70–99)
POTASSIUM SERPL-SCNC: 3.9 MMOL/L (ref 3.4–5.3)
SODIUM SERPL-SCNC: 138 MMOL/L (ref 135–145)

## 2024-01-03 PROCEDURE — 80048 BASIC METABOLIC PNL TOTAL CA: CPT

## 2024-01-03 PROCEDURE — 36415 COLL VENOUS BLD VENIPUNCTURE: CPT

## 2024-01-09 ENCOUNTER — OFFICE VISIT (OUTPATIENT)
Dept: FAMILY MEDICINE | Facility: CLINIC | Age: 54
End: 2024-01-09
Payer: COMMERCIAL

## 2024-01-09 VITALS
TEMPERATURE: 98.1 F | SYSTOLIC BLOOD PRESSURE: 132 MMHG | HEIGHT: 71 IN | OXYGEN SATURATION: 99 % | WEIGHT: 315 LBS | RESPIRATION RATE: 18 BRPM | BODY MASS INDEX: 44.1 KG/M2 | DIASTOLIC BLOOD PRESSURE: 86 MMHG | HEART RATE: 77 BPM

## 2024-01-09 DIAGNOSIS — M70.42 PREPATELLAR BURSITIS OF LEFT KNEE: Primary | ICD-10-CM

## 2024-01-09 PROCEDURE — 99213 OFFICE O/P EST LOW 20 MIN: CPT | Performed by: NURSE PRACTITIONER

## 2024-01-09 ASSESSMENT — PAIN SCALES - GENERAL: PAINLEVEL: MODERATE PAIN (4)

## 2024-01-09 NOTE — COMMUNITY RESOURCES LIST (ENGLISH)
01/09/2024   [facility]  N/A  For questions about this resource list or additional care needs, please contact your primary care clinic or care manager.  Phone: [phone]   Email: N/A   Address: [address] [place] [address]   Hours: N/A        Financial Stability       Rent and mortgage payment assistance  1  [facility] - Rent payment assistance Distance: 0.26 miles      In-Person, Phone/Virtual   [address] [place] [address]  Language: English  Hours: Mon - Fri 8:00 AM - 4:30 PM  Fees: Free   Phone: (918) 118-4341 Email: tk@[facility]. Website: https://www.[facility]./Facilities/Facility/Details/23     2  [employer] Distance: 2.42 miles      In-Person, Phone/Virtual   [address] [place] [address]  Language: English  Hours: Mon - Sun Appt. Only  Fees: Free   Phone: (968) 817-6732 Email: ramakrishna@[employer] Website: http://www.communityhelpinghand.org          Food and Nutrition       Food pantry  3  [facility] Food Pantry Distance: 1.85 miles      Pickup   [address] [address]  Language: English  Hours: Mon - Fri 9:00 AM - 3:00 PM  Fees: Free   Phone: (294) 681-8930 Email: office@[employer] [place] [facility] Website: http://www.[employer].net/     4  [employer] [facility] [place] Distance: 1.99 miles      Pickup   [address] [place] [address]  Language: English  Hours: Mon - Tue 9:00 AM - 5:00 PM , Thu 9:00 AM - 5:00 PM , Sat 9:00 AM - 12:00 PM  Fees: Free   Phone: (261) 576-9147 Ext.112 Email: perfecto@[employer].org Website: http://www.[employer].org     SNAP application assistance  5  [facility] Distance: 0.26 miles      In-Person, Phone/Virtual   [address] [place] [address]  Language: English  Hours: Mon - Fri 8:00 AM - 4:30 PM  Fees: Free   Phone:  (252) 435-6235 Email: tk@[facility]. Website: https://www.[facility]/Facilities/Facility/Details/23     6  [facility] - Economic Support [facility] Distance: 16.8 miles      In-Person, Phone/Virtual   [address] [place] [address]  Language: English  Hours: Mon - Fri 8:00 AM - 4:30 PM  Fees: Free   Phone: (245) 836-3495 Email: tk@[facility] Website: https://www.[facility]/787/Economic-Support     Soup kitchen or free meals  7  [facility] - Homeless Outreach Services Team - Food Assistance Distance: 12.69 miles      Pickup   [address] [place] [address]  Language: English  Hours: Mon - Thu 9:30 AM - 3:30 PM  Fees: Free   Phone: (287) 427-8784 Email: office@[employer] Website: https://www.[employer]/community-resource-center/     8  [facility] - [facility] - Thursday Night Community Meal Distance: 12.8 miles      In-Person   [address] [place] [address]  Language: English, Uzbek  Hours: Thu 6:00 PM - 7:00 PM  Fees: Free   Phone: (370) 439-3112 Email: center@[employer] Website: https://www.[facility].[employer]/community-resource-center/          Important Numbers & Websites       Emergency Services   911  [facility]   311  Poison Control   (366) 987-8557  Suicide Prevention Lifeline   (143) 301-7289 (TALK)  Child Abuse Hotline   (502) 390-1918 (4-A-Child)  Sexual Assault Hotline   (356) 779-6496 (HOPE)  National Runaway Safeline   (254) 618-1753 (RUNAWAY)  All-Options Talkline   (443) 157-6484  Substance Abuse Referral   (951) 969-5225 (HELP)

## 2024-01-09 NOTE — LETTER
January 9, 2024      Tino Gleason  [address] [address]        To Whom It May Concern:    Tino Gleason was seen in our clinic. He may return to work with the following: No kneeling on the left knee until symptoms of his bursitis are resolved.      Sincerely,        Thea Olvera NP on 1/9/2024 at 11:36 AM

## 2024-01-09 NOTE — PROGRESS NOTES
Assessment & Plan     Prepatellar bursitis of left knee  Handout given.  Advised to use knee compression and conservative treatment.  I reassured him that this can take some time to heal.  Unlikely gout since there is mild tenderness on the prepatellar area otherwise the joint is pain free.  Follow-up as needed if any worsening or persistent symptoms that do not continue to resolve.     MED REC REQUIRED    Post Medication Reconciliation Status: discharge medications reconciled, continue medications without change    See Patient Instructions    Thea Olvera NP  [facility] Jim[facility]nez is a 53 year old, presenting for the following health issues:  ER F/U        1/9/2024    11:11 AM   Additional Questions   Roomed by Leora TURNER CMA       [facility]     ED/UC Followup:    Facility:  [facility]  Date of visit: 12/29/2024  Reason for visit: Suprapatellar bursitis of left knee   Current Status: Per patient - knee still hurts. Thinks maybe it is due mixing up some medications with his daughter. He accidentally took Lisinopril/Hydrochlorothiazide which causing his joints to act up. Usually gets gout when takes Hydrochlorothiazide, but has not seen that as an issue this time yet.    After discussion with patient he does not feel this is gout since that is usually much worse but did state that he threw away some of his daughter's medications he found in the cupboard that had hydrochlorothiazide in them but does not feel that he took them.  Symptoms are improving but slowly per patient.  He is using heat to the left knee at night, ice and compression.  He is taking ibuprofen for the pain which is helpful.  Patient does kneel daily on his knees for his job in the [employer].  He does use gel pads for his knee's when he does this.      Review of Systems   CONSTITUTIONAL: NEGATIVE for fever, chills, change in weight  RESP: NEGATIVE for significant cough or SOB  CV:  "NEGATIVE for chest pain, palpitations or peripheral edema  MUSCULOSKELETAL: POSITIVE  for left knee pain and swelling with some improvement since ER visit.  PSYCHIATRIC: NEGATIVE for changes in mood or affect  ROS otherwise negative      Objective    /86 (BP Location: Left arm, Patient Position: Sitting, Cuff Size: Adult Large)   Pulse 77   Temp 98.1  F (36.7  C) (Tympanic)   Resp 18   Ht 1.803 m (5' 11\")   Wt 148.3 kg (327 lb)   SpO2 99%   BMI 45.61 kg/m    Body mass index is 45.61 kg/m .  Physical Exam   GENERAL: healthy, alert and no distress  MS: Normal ROM of left knee, pin point tenderness in proximal pre patellar area of the knee with mild swelling and no redness.  No tenderness in all other aspects of the knee joint.  PSYCH: mentation appears normal, affect normal/bright          "

## 2024-01-26 DIAGNOSIS — E11.65 TYPE 2 DIABETES MELLITUS WITH HYPERGLYCEMIA, WITHOUT LONG-TERM CURRENT USE OF INSULIN (H): ICD-10-CM

## 2024-01-26 RX ORDER — METFORMIN HCL 500 MG
500 TABLET, EXTENDED RELEASE 24 HR ORAL 2 TIMES DAILY WITH MEALS
Qty: 180 TABLET | Refills: 1 | Status: SHIPPED | OUTPATIENT
Start: 2024-01-26 | End: 2024-07-31

## 2024-01-26 NOTE — TELEPHONE ENCOUNTER
Requested Prescriptions   Pending Prescriptions Disp Refills    metFORMIN (GLUCOPHAGE XR) 500 MG 24 hr tablet 180 tablet 1     Sig: Take 1 tablet (500 mg) by mouth 2 times daily (with meals)       Biguanide Agents Failed - 1/26/2024  1:52 PM        Failed - Has GFR on file in past 12 months and most recent value is normal        Passed - Patient is age 10 or older        Passed - Patient has documented A1c within the specified period of time.     If HgbA1C is 8 or greater, it needs to be on file within the past 3 months.  If less than 8, must be on file within the past 6 months.     Recent Labs   Lab Test 12/01/23  1106   A1C 7.4*             Passed - Patient does NOT have a diagnosis of CHF.        Passed - Medication is active on med list        Passed - Medication indicated for associated diagnosis     Medication is associated with one or more of the following diagnoses:     Gestational diabetes mellitus     Hyperinsulinar obesity     Hypersecretion of ovarian androgens    Non-alcoholic fatty liver    Polycystic ovarian syndrome               Pre-diabetes (DM 2 prevention)    Type 2 diabetes mellitus     Weight gain, antipsychotic therapy-induced             Passed - Recent (6 mo) or future (90 days) visit within the authorizing provider's specialty     The patient must have completed an in-person or virtual visit within the past 6 months or has a future visit scheduled within the next 90 days with the authorizing provider s specialty.  Urgent care and e-visits do not quality as an office visit for this protocol.

## 2024-03-12 ENCOUNTER — OFFICE VISIT (OUTPATIENT)
Dept: OPHTHALMOLOGY | Facility: CLINIC | Age: 54
End: 2024-03-12
Attending: OPHTHALMOLOGY
Payer: COMMERCIAL

## 2024-03-12 DIAGNOSIS — H52.211 IRREGULAR ASTIGMATISM OF RIGHT EYE: Primary | ICD-10-CM

## 2024-03-12 DIAGNOSIS — Z94.7 PENETRATING KERATOPLASTY GRAFT IN PLACE: ICD-10-CM

## 2024-03-12 DIAGNOSIS — Z98.890 POSTOPERATIVE EYE STATE: ICD-10-CM

## 2024-03-12 DIAGNOSIS — H18.711 CORNEAL ECTASIA OF RIGHT EYE: ICD-10-CM

## 2024-03-12 PROCEDURE — 99214 OFFICE O/P EST MOD 30 MIN: CPT | Mod: GC | Performed by: OPHTHALMOLOGY

## 2024-03-12 PROCEDURE — 99213 OFFICE O/P EST LOW 20 MIN: CPT | Performed by: OPHTHALMOLOGY

## 2024-03-12 PROCEDURE — 92025 CPTRIZED CORNEAL TOPOGRAPHY: CPT | Performed by: OPHTHALMOLOGY

## 2024-03-12 RX ORDER — OFLOXACIN 3 MG/ML
1-2 SOLUTION/ DROPS OPHTHALMIC 4 TIMES DAILY
Qty: 5 ML | Refills: 1 | Status: SHIPPED | OUTPATIENT
Start: 2024-03-12 | End: 2024-03-16

## 2024-03-12 ASSESSMENT — TONOMETRY
IOP_METHOD: ICARE
OD_IOP_MMHG: 10
OS_IOP_MMHG: 4

## 2024-03-12 ASSESSMENT — VISUAL ACUITY
OS_PH_CC: 20/40
OS_CC: 20/50
OD_SC+: -1
CORRECTION_TYPE: CONTACTS
OS_CC+: -2
METHOD: SNELLEN - LINEAR
OD_SC: 20/125

## 2024-03-12 ASSESSMENT — SLIT LAMP EXAM - LIDS
COMMENTS: NORMAL
COMMENTS: NORMAL

## 2024-03-12 NOTE — PROGRESS NOTES
HPI: Mr. Gleason was born in 1970 with Hx of keratoconus. right eye corneal transplant in 2010 - surgeon unknown by pt.. Now right eye can not wear CL without corneal edema developing.   Pt started to wear glasses at age 5.  Pt states that left eye has changed yearly.  No old records available.    Interval 3/12/24: POM11 s/p right eye PKP. Doing well. No new changes in vision or new concerns. Right eye is comfortable. Using prednisolone QID and and ointment at bedtime right eye.     CL: scleral lenses left eye   Can wear CL right eye due to corneal edema developing.   Glasses - none  Meds:    - Prednisolone QID right eye ( pt has been only using tid).. Pt told to use qid.   - Erythromycin ointment QID OD  Surgery : right eye PKP 2009 & 2010.      A/P:  # Keratoconus each eye  # History of right eye PKPx2 with failing graft  # S/p right eye attempted DMEK 3/29/23, aborted due to obstructed view from corneal edema (see OP note).  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 Disc possible need for pupiloplasty, disc r, b and PC for future surgery  - right eye cataract - disc CAtaract extraction after optimaization with suture removal of PKP right eye today (3/12/24)    PLAN:  - Cont. pred forte QID right eye.   - continue erythromycin qid    RTC:  1 months VT- and ALVIN OD call sooner with any problems     MD Vita Frankel MD  Cornea and External Disease Fellow  [facility]       Attending Physician Attestation:  Complete documentation of historical and exam elements from today's encounter can be found in the full encounter summary report (not reduplicated in this progress note).  I personally obtained the chief complaint(s) and history of present illness.  I confirmed and edited as necessary the review of systems, past medical/surgical history, family history, social history, and examination  findings as documented by others; and I examined the patient myself.  I personally reviewed the relevant tests, images, and reports as documented above.  I formulated and edited as necessary the assessment and plan and discussed the findings and management plan with the patient and family. - Mick Paul MD

## 2024-03-12 NOTE — NURSING NOTE
Chief Complaints and History of Present Illnesses   Patient presents with    Keratoconus Follow Up     Iridodialysis of right eye     Chief Complaint(s) and History of Present Illness(es)       Keratoconus Follow Up              Associated symptoms: tearing.  Negative for dryness, eye pain, flashes and floaters    Pain scale: 0/10    Comments: Iridodialysis of right eye              Comments    Pt states vision might be a little worse.  No pain.  No flashes/floaters.    Pt is compliant with drops.    DM 2  Pt did not check BS today.  Lab Results       Component                Value               Date                       A1C                      7.4                 12/01/2023                 A1C                      7.8                 07/31/2023                 A1C                      9.8                 04/27/2023                 A1C                      12.6                02/21/2023              FERNANDO Erwin March 12, 2024 8:32 AM

## 2024-03-13 ENCOUNTER — ANESTHESIA EVENT (OUTPATIENT)
Dept: GASTROENTEROLOGY | Facility: CLINIC | Age: 54
End: 2024-03-13
Payer: COMMERCIAL

## 2024-03-13 NOTE — ANESTHESIA PREPROCEDURE EVALUATION
Anesthesia Pre-Procedure Evaluation    Patient: Tino Gleason   MRN: [id number] : 1970        Procedure : Procedure(s):  Colonoscopy          Past Medical History:   Diagnosis Date    Diabetes mellitus, type 2 (H)     HTN (hypertension)     Hyperlipidemia LDL goal <70     Stable keratoconus of both eyes       Past Surgical History:   Procedure Laterality Date    KERATOPLASTY DESCEMETS MEMBRANE ENDOTHELIAL (DMEK) Right 3/29/2023    Procedure: ATTEMPTED DESCEMET'S MEMBRANE ENDOTHELIAL KERATOPLASTY (DMEK) RIGHT, VIEW OBSCURED, PROCEDURE ABORTED.;  Surgeon: Mick Paul MD;  Location: [facility] OR    VT ANESTH,CORNEAL TRANSPLANT        Allergies   Allergen Reactions    Hydrochlorothiazide      Gout flares    Hydrocodone-Acetaminophen Hives and Swelling    Naproxen GI Disturbance     Other reaction(s): GI Upset      Social History     Tobacco Use    Smoking status: Never    Smokeless tobacco: Never   Substance Use Topics    Alcohol use: Yes     Alcohol/week: 10.0 standard drinks of alcohol     Types: 10 Standard drinks or equivalent per week     Comment: occasional      Wt Readings from Last 1 Encounters:   24 148.3 kg (327 lb)        Anesthesia Evaluation            ROS/MED HX  ENT/Pulmonary:    (-) tobacco use   Neurologic:  - neg neurologic ROS     Cardiovascular:     (+) Dyslipidemia hypertension- -   -  - -                                      METS/Exercise Tolerance: >4 METS    Hematologic:  - neg hematologic  ROS     Musculoskeletal:  - neg musculoskeletal ROS     GI/Hepatic:  - neg GI/hepatic ROS     Renal/Genitourinary:  - neg Renal ROS     Endo:     (+)  type II DM,             Obesity,       Psychiatric/Substance Use:  - neg psychiatric ROS     Infectious Disease:  - neg infectious disease ROS     Malignancy:       Other:            Physical Exam    Airway  airway exam normal      Mallampati: II   TM distance: > 3 FB   Neck ROM: full   Mouth opening: > 3 cm    Respiratory Devices and  "Support         Dental  no notable dental history     (+) Minor Abnormalities - some fillings, tiny chips      Cardiovascular   cardiovascular exam normal          Pulmonary   pulmonary exam normal                OUTSIDE LABS:  CBC: No results found for: \"WBC\", \"HGB\", \"HCT\", \"PLT\"  BMP:   Lab Results   Component Value Date     01/03/2024     10/04/2023    POTASSIUM 3.9 01/03/2024    POTASSIUM 3.8 10/04/2023    CHLORIDE 102 01/03/2024    CHLORIDE 103 10/04/2023    CO2 25 01/03/2024    CO2 26 10/04/2023    BUN 20.8 (H) 01/03/2024    BUN 10.7 10/04/2023    CR 1.18 (H) 01/03/2024    CR 0.95 10/04/2023     (H) 01/03/2024     (H) 10/04/2023     COAGS: No results found for: \"PTT\", \"INR\", \"FIBR\"  POC: No results found for: \"BGM\", \"HCG\", \"HCGS\"  HEPATIC:   Lab Results   Component Value Date    ALBUMIN 4.2 04/27/2023    PROTTOTAL 8.2 04/27/2023    ALT 57 (H) 04/27/2023    AST 20 04/27/2023    ALKPHOS 119 04/27/2023    BILITOTAL 0.3 04/27/2023     OTHER:   Lab Results   Component Value Date    A1C 7.4 (H) 12/01/2023    MINDY 9.7 01/03/2024       Anesthesia Plan    ASA Status:  3    NPO Status:  NPO Appropriate    Anesthesia Type: General.   Induction: Propofol, Intravenous.   Maintenance: TIVA.        Consents    Anesthesia Plan(s) and associated risks, benefits, and realistic alternatives discussed. Questions answered and patient/representative(s) expressed understanding.     - Discussed: Risks, Benefits and Alternatives for BOTH SEDATION and the PROCEDURE were discussed     - Discussed with:  Patient            Postoperative Care    Pain management: Multi-modal analgesia, IV analgesics, Oral pain medications.   PONV prophylaxis: Ondansetron (or other 5HT-3), Dexamethasone or Solumedrol, Background Propofol Infusion     Comments:               TESHA Christensen CRNA    I have reviewed the pertinent notes and labs in the chart from the past 30 days and (re)examined the patient.  Any updates or " changes from those notes are reflected in this note.              # DMII: A1C = N/A within past 6 months

## 2024-03-14 ENCOUNTER — ANESTHESIA (OUTPATIENT)
Dept: GASTROENTEROLOGY | Facility: CLINIC | Age: 54
End: 2024-03-14
Payer: COMMERCIAL

## 2024-03-14 ENCOUNTER — HOSPITAL ENCOUNTER (OUTPATIENT)
Facility: CLINIC | Age: 54
Discharge: HOME | End: 2024-03-14
Attending: SURGERY | Admitting: SURGERY
Payer: COMMERCIAL

## 2024-03-14 VITALS
DIASTOLIC BLOOD PRESSURE: 97 MMHG | OXYGEN SATURATION: 96 % | TEMPERATURE: 98.3 F | WEIGHT: 315 LBS | HEART RATE: 66 BPM | RESPIRATION RATE: 16 BRPM | BODY MASS INDEX: 44.1 KG/M2 | SYSTOLIC BLOOD PRESSURE: 134 MMHG | HEIGHT: 71 IN

## 2024-03-14 LAB
COLONOSCOPY: NORMAL
GLUCOSE BLDC GLUCOMTR-MCNC: 144 MG/DL (ref 70–99)

## 2024-03-14 PROCEDURE — 82962 GLUCOSE BLOOD TEST: CPT

## 2024-03-14 PROCEDURE — 45380 COLONOSCOPY AND BIOPSY: CPT | Performed by: SURGERY

## 2024-03-14 PROCEDURE — 88305 TISSUE EXAM BY PATHOLOGIST: CPT | Mod: TC | Performed by: SURGERY

## 2024-03-14 PROCEDURE — 88305 TISSUE EXAM BY PATHOLOGIST: CPT | Mod: 26 | Performed by: PATHOLOGY

## 2024-03-14 PROCEDURE — 250N000011 HC RX IP 250 OP 636: Performed by: NURSE ANESTHETIST, CERTIFIED REGISTERED

## 2024-03-14 PROCEDURE — 370N000017 HC ANESTHESIA TECHNICAL FEE, PER MIN: Performed by: SURGERY

## 2024-03-14 PROCEDURE — 250N000009 HC RX 250: Performed by: PHYSICIAN ASSISTANT

## 2024-03-14 PROCEDURE — 250N000009 HC RX 250: Performed by: NURSE ANESTHETIST, CERTIFIED REGISTERED

## 2024-03-14 PROCEDURE — 258N000003 HC RX IP 258 OP 636: Performed by: PHYSICIAN ASSISTANT

## 2024-03-14 RX ORDER — PROPOFOL 10 MG/ML
INJECTION, EMULSION INTRAVENOUS PRN
Status: DISCONTINUED | OUTPATIENT
Start: 2024-03-14 | End: 2024-03-14

## 2024-03-14 RX ORDER — LIDOCAINE HYDROCHLORIDE 20 MG/ML
INJECTION, SOLUTION INFILTRATION; PERINEURAL PRN
Status: DISCONTINUED | OUTPATIENT
Start: 2024-03-14 | End: 2024-03-14

## 2024-03-14 RX ORDER — NALOXONE HYDROCHLORIDE 0.4 MG/ML
0.1 INJECTION, SOLUTION INTRAMUSCULAR; INTRAVENOUS; SUBCUTANEOUS
Status: DISCONTINUED | OUTPATIENT
Start: 2024-03-14 | End: 2024-03-14 | Stop reason: HOSPADM

## 2024-03-14 RX ORDER — SODIUM CHLORIDE, SODIUM LACTATE, POTASSIUM CHLORIDE, CALCIUM CHLORIDE 600; 310; 30; 20 MG/100ML; MG/100ML; MG/100ML; MG/100ML
INJECTION, SOLUTION INTRAVENOUS CONTINUOUS
Status: DISCONTINUED | OUTPATIENT
Start: 2024-03-14 | End: 2024-03-14 | Stop reason: HOSPADM

## 2024-03-14 RX ORDER — ALBUTEROL SULFATE 0.83 MG/ML
2.5 SOLUTION RESPIRATORY (INHALATION) EVERY 4 HOURS PRN
Status: DISCONTINUED | OUTPATIENT
Start: 2024-03-14 | End: 2024-03-14 | Stop reason: HOSPADM

## 2024-03-14 RX ORDER — ONDANSETRON 2 MG/ML
4 INJECTION INTRAMUSCULAR; INTRAVENOUS EVERY 30 MIN PRN
Status: DISCONTINUED | OUTPATIENT
Start: 2024-03-14 | End: 2024-03-14 | Stop reason: HOSPADM

## 2024-03-14 RX ORDER — PROPOFOL 10 MG/ML
INJECTION, EMULSION INTRAVENOUS CONTINUOUS PRN
Status: DISCONTINUED | OUTPATIENT
Start: 2024-03-14 | End: 2024-03-14

## 2024-03-14 RX ORDER — ONDANSETRON 4 MG/1
4 TABLET, ORALLY DISINTEGRATING ORAL EVERY 30 MIN PRN
Status: DISCONTINUED | OUTPATIENT
Start: 2024-03-14 | End: 2024-03-14 | Stop reason: HOSPADM

## 2024-03-14 RX ORDER — LIDOCAINE 40 MG/G
CREAM TOPICAL
Status: DISCONTINUED | OUTPATIENT
Start: 2024-03-14 | End: 2024-03-14 | Stop reason: HOSPADM

## 2024-03-14 RX ORDER — DEXAMETHASONE SODIUM PHOSPHATE 4 MG/ML
4 INJECTION, SOLUTION INTRA-ARTICULAR; INTRALESIONAL; INTRAMUSCULAR; INTRAVENOUS; SOFT TISSUE
Status: DISCONTINUED | OUTPATIENT
Start: 2024-03-14 | End: 2024-03-14 | Stop reason: HOSPADM

## 2024-03-14 RX ADMIN — SODIUM CHLORIDE, POTASSIUM CHLORIDE, SODIUM LACTATE AND CALCIUM CHLORIDE: 600; 310; 30; 20 INJECTION, SOLUTION INTRAVENOUS at 07:22

## 2024-03-14 RX ADMIN — PROPOFOL 150 MCG/KG/MIN: 10 INJECTION, EMULSION INTRAVENOUS at 07:57

## 2024-03-14 RX ADMIN — LIDOCAINE HYDROCHLORIDE 50 MG: 20 INJECTION, SOLUTION INFILTRATION; PERINEURAL at 07:57

## 2024-03-14 RX ADMIN — PROPOFOL 70 MG: 10 INJECTION, EMULSION INTRAVENOUS at 07:57

## 2024-03-14 RX ADMIN — LIDOCAINE HYDROCHLORIDE 0.1 ML: 10 INJECTION, SOLUTION EPIDURAL; INFILTRATION; INTRACAUDAL; PERINEURAL at 07:22

## 2024-03-14 ASSESSMENT — ACTIVITIES OF DAILY LIVING (ADL)
ADLS_ACUITY_SCORE: 35
ADLS_ACUITY_SCORE: 35

## 2024-03-14 ASSESSMENT — LIFESTYLE VARIABLES: TOBACCO_USE: 0

## 2024-03-14 NOTE — ANESTHESIA CARE TRANSFER NOTE
Patient: Tino Gleason    Procedure: Procedure(s):  COLONOSCOPY, WITH POLYPECTOMY AND BIOPSY       Diagnosis: Screen for colon cancer [Z12.11]  Diagnosis Additional Information: No value filed.    Anesthesia Type:   General     Note:    Oropharynx: oropharynx clear of all foreign objects and spontaneously breathing  Level of Consciousness: drowsy  Oxygen Supplementation: room air    Independent Airway: airway patency satisfactory and stable  Dentition: dentition unchanged  Vital Signs Stable: post-procedure vital signs reviewed and stable  Report to RN Given: handoff report given  Patient transferred to: Phase II    Handoff Report: Identifed the Patient, Identified the Reponsible Provider, Reviewed the pertinent medical history, Discussed the surgical course, Reviewed Intra-OP anesthesia mangement and issues during anesthesia, Set expectations for post-procedure period and Allowed opportunity for questions and acknowledgement of understanding      Vitals:  Vitals Value Taken Time   BP     Temp     Pulse     Resp     SpO2 93 % 03/14/24 0817   Vitals shown include unfiled device data.    Electronically Signed By: TESHA Roberts CRNA  March 14, 2024  8:19 AM

## 2024-03-14 NOTE — ANESTHESIA POSTPROCEDURE EVALUATION
Patient: Tino Gleason    Procedure: Procedure(s):  COLONOSCOPY, WITH POLYPECTOMY AND BIOPSY       Anesthesia Type:  General    Note:  Disposition: Outpatient   Postop Pain Control: Uneventful            Sign Out: Well controlled pain   PONV: No   Neuro/Psych: Uneventful            Sign Out: Acceptable/Baseline neuro status   Airway/Respiratory: Uneventful            Sign Out: Acceptable/Baseline resp. status   CV/Hemodynamics: Uneventful            Sign Out: Acceptable CV status; No obvious hypovolemia; No obvious fluid overload   Other NRE: NONE   DID A NON-ROUTINE EVENT OCCUR? No           Last vitals:  Vitals Value Taken Time   BP     Temp     Pulse     Resp     SpO2 93 % 03/14/24 0817   Vitals shown include unfiled device data.    Electronically Signed By: TESHA Roberts CRNA  March 14, 2024  8:19 AM

## 2024-03-17 ENCOUNTER — HEALTH MAINTENANCE LETTER (OUTPATIENT)
Age: 54
End: 2024-03-17

## 2024-03-18 ENCOUNTER — TELEPHONE (OUTPATIENT)
Dept: FAMILY MEDICINE | Facility: CLINIC | Age: 54
End: 2024-03-18
Payer: COMMERCIAL

## 2024-03-18 LAB
PATH REPORT.COMMENTS IMP SPEC: NORMAL
PATH REPORT.COMMENTS IMP SPEC: NORMAL
PATH REPORT.FINAL DX SPEC: NORMAL
PATH REPORT.GROSS SPEC: NORMAL
PATH REPORT.MICROSCOPIC SPEC OTHER STN: NORMAL
PATH REPORT.RELEVANT HX SPEC: NORMAL
PHOTO IMAGE: NORMAL

## 2024-03-19 NOTE — TELEPHONE ENCOUNTER
Prior Authorization Retail Medication Request    Medication/Dose: Semaglutide 2 mg  Diagnosis and ICD code (if different than what is on RX):    New/renewal/insurance change PA/secondary ins. PA:  Previously Tried and Failed:  metformin;  Rationale:  patient has used since 4/2022    Insurance   Primary: [phone]  Insurance ID:  [id number]    Secondary (if applicable):  Insurance ID:      Pharmacy Information (if different than what is on RX)  Name:    Phone:    Fax:

## 2024-03-28 NOTE — TELEPHONE ENCOUNTER
Central Prior Authorization Team  Phone: [phone]    PA Initiation    Medication: OZEMPIC (1 MG/DOSE) 4 MG/3ML SC SOPN  Insurance Company: [employer] - Phone [phone] Fax [phone]  Pharmacy Filling the Rx: [id number] - [place], [place] - [address]  Filling Pharmacy Phone: [phone]  Filling Pharmacy Fax:    Start Date: 3/28/2024

## 2024-03-29 NOTE — TELEPHONE ENCOUNTER
Prior Authorization Approval    Authorization Effective Date: 2/27/2024  Authorization Expiration Date: 3/28/2025  Medication: Semaglutide 2 mg-APPROVED  Reference #:     Insurance Company: [employer] - Phone [phone] Fax [phone]  Which Pharmacy is filling the prescription (Not needed for infusion/clinic administered): [facility] [address] [place] [address] - [facility] [address]  Pharmacy Notified: Yes  Patient Notified: Instructed pharmacy to notify patient when script is ready to /ship.

## 2024-04-16 ENCOUNTER — OFFICE VISIT (OUTPATIENT)
Dept: OPHTHALMOLOGY | Facility: CLINIC | Age: 54
End: 2024-04-16
Attending: OPHTHALMOLOGY
Payer: COMMERCIAL

## 2024-04-16 DIAGNOSIS — Z94.7 PENETRATING KERATOPLASTY GRAFT IN PLACE: ICD-10-CM

## 2024-04-16 DIAGNOSIS — H52.211 IRREGULAR ASTIGMATISM OF RIGHT EYE: Primary | ICD-10-CM

## 2024-04-16 DIAGNOSIS — H18.711 CORNEAL ECTASIA OF RIGHT EYE: ICD-10-CM

## 2024-04-16 DIAGNOSIS — Z98.890 POSTOPERATIVE EYE STATE: ICD-10-CM

## 2024-04-16 DIAGNOSIS — H52.213 IRREGULAR ASTIGMATISM OF BOTH EYES: ICD-10-CM

## 2024-04-16 PROCEDURE — 99214 OFFICE O/P EST MOD 30 MIN: CPT | Performed by: OPHTHALMOLOGY

## 2024-04-16 PROCEDURE — 92025 CPTRIZED CORNEAL TOPOGRAPHY: CPT | Performed by: OPHTHALMOLOGY

## 2024-04-16 RX ORDER — OFLOXACIN 3 MG/ML
1-2 SOLUTION/ DROPS OPHTHALMIC 4 TIMES DAILY
Qty: 5 ML | Refills: 1 | Status: SHIPPED | OUTPATIENT
Start: 2024-04-16 | End: 2024-04-20

## 2024-04-16 ASSESSMENT — VISUAL ACUITY
OS_CC: 20/50
METHOD: SNELLEN - LINEAR
OS_PH_CC: 20/30
CORRECTION_TYPE: CONTACTS
OS_CC+: -1
OS_PH_CC+: -2
OD_SC: 20/80

## 2024-04-16 ASSESSMENT — REFRACTION_WEARINGRX
OS_ADD: +2.25
OD_CYLINDER: +3.00
OS_CYLINDER: +2.75
OD_AXIS: 150
OD_ADD: +2.25
OD_SPHERE: -14.75
OS_SPHERE: -17.50
OS_AXIS: 035

## 2024-04-16 ASSESSMENT — TONOMETRY
OS_IOP_MMHG: XX
OD_IOP_MMHG: 12
IOP_METHOD: TONOPEN

## 2024-04-16 ASSESSMENT — SLIT LAMP EXAM - LIDS
COMMENTS: NORMAL
COMMENTS: NORMAL

## 2024-04-16 ASSESSMENT — PACHYMETRY
OS_CT(UM): 299
OD_CT(UM): 599

## 2024-04-16 NOTE — NURSING NOTE
Chief Complaints and History of Present Illnesses   Patient presents with    Follow Up     Irregular astigmatism of right eye  right eye PKP 4/6/23,     Chief Complaint(s) and History of Present Illness(es)       Follow Up              Comments: Irregular astigmatism of right eye  right eye PKP 4/6/23,              Comments    Pt states no change in VA since last visit  States no tearing or mattering  No eye pain or redness    Roxy Bhakta COT 8:49 AM April 16, 2024

## 2024-04-16 NOTE — PROGRESS NOTES
HPI: Mr. Gleason was born in 1970 with Hx of keratoconus. right eye corneal transplant in 2010 - surgeon unknown by pt.. Now right eye can not wear CL without corneal edema developing.   Pt started to wear glasses at age 5.  Pt states that left eye has changed yearly.  No old records available.    Interval 4/6/24: POM12 s/p right eye PKP. Doing well. No new changes in vision or new concerns. Right eye is comfortable. Using prednisolone QID and and ointment at bedtime right eye.     CL: scleral lenses left eye   Can wear CL right eye due to corneal edema developing.   Glasses - none  Surgery : right eye PKP 2009 & 2010 and 4/6/23    Meds:    - Prednisolone QID right eye    - Erythromycin ointment QID OD        A/P:  # Keratoconus each eye  # History of right eye PKPx2 with failing graft  # S/p right eye attempted DMEK 3/29/23, aborted due to obstructed view from corneal edema (see OP note).  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 Disc possible need for pupiloplasty, disc r, b and PC for future surgery  - right eye cataract - disc cataract extraction after optimaization with suture removal of PKP right eye today (3/12/24)    PLAN:  - Reduce pred forte tid right eye.   - continue erythromycin qid  - Removed 1 suture at 8 OC - based on tolo today. Start oflocx right eye qid X  4 days.    RTC:  1 months VT- and ALVIN OD call sooner with any problems     Mick Paul MD      Attending Physician Attestation:  Complete documentation of historical and exam elements from today's encounter can be found in the full encounter summary report (not reduplicated in this progress note).  I personally obtained the chief complaint(s) and history of present illness.  I confirmed and edited as necessary the review of systems, past medical/surgical history, family history, social history, and examination findings as documented by others; and I  examined the patient myself.  I personally reviewed the relevant tests, images, and reports as documented above.  I formulated and edited as necessary the assessment and plan and discussed the findings and management plan with the patient and family. - Mick Paul MD

## 2024-05-04 ENCOUNTER — APPOINTMENT (OUTPATIENT)
Dept: GENERAL RADIOLOGY | Facility: CLINIC | Age: 54
End: 2024-05-04
Attending: FAMILY MEDICINE
Payer: COMMERCIAL

## 2024-05-04 ENCOUNTER — HOSPITAL ENCOUNTER (EMERGENCY)
Facility: CLINIC | Age: 54
Discharge: HOME | End: 2024-05-04
Attending: FAMILY MEDICINE | Admitting: FAMILY MEDICINE
Payer: COMMERCIAL

## 2024-05-04 VITALS
SYSTOLIC BLOOD PRESSURE: 154 MMHG | BODY MASS INDEX: 44.1 KG/M2 | TEMPERATURE: 97.6 F | WEIGHT: 315 LBS | OXYGEN SATURATION: 98 % | DIASTOLIC BLOOD PRESSURE: 89 MMHG | RESPIRATION RATE: 18 BRPM | HEIGHT: 71 IN | HEART RATE: 77 BPM

## 2024-05-04 DIAGNOSIS — M19.071 ARTHRITIS OF FIRST METATARSOPHALANGEAL (MTP) JOINT OF RIGHT FOOT: ICD-10-CM

## 2024-05-04 PROCEDURE — 99283 EMERGENCY DEPT VISIT LOW MDM: CPT | Performed by: FAMILY MEDICINE

## 2024-05-04 PROCEDURE — 73630 X-RAY EXAM OF FOOT: CPT | Mod: RT

## 2024-05-04 RX ORDER — PREDNISONE 20 MG/1
TABLET ORAL
Qty: 10 TABLET | Refills: 0 | Status: SHIPPED | OUTPATIENT
Start: 2024-05-04 | End: 2024-06-24

## 2024-05-04 ASSESSMENT — COLUMBIA-SUICIDE SEVERITY RATING SCALE - C-SSRS
2. HAVE YOU ACTUALLY HAD ANY THOUGHTS OF KILLING YOURSELF IN THE PAST MONTH?: NO
1. IN THE PAST MONTH, HAVE YOU WISHED YOU WERE DEAD OR WISHED YOU COULD GO TO SLEEP AND NOT WAKE UP?: NO
6. HAVE YOU EVER DONE ANYTHING, STARTED TO DO ANYTHING, OR PREPARED TO DO ANYTHING TO END YOUR LIFE?: NO

## 2024-05-04 ASSESSMENT — ACTIVITIES OF DAILY LIVING (ADL): ADLS_ACUITY_SCORE: 35

## 2024-05-04 NOTE — ED PROVIDER NOTES
History     Chief Complaint   Patient presents with    Foot Pain     HPI    Tino Gleason is a 53 year old male who comes in with pain redness and swelling of the right great toe first MTP joint that began yesterday suddenly and feels like previous episodes of gout that he has had.  He does not get frequent flareups although he had some bursitis in his knee for which she was seen December 29, 2023.  I reviewed the records of that visit.  He is not having any systemic symptoms of illness.  He is not having any fever, sweats, chills, nausea, vomiting.  He does have type 2 diabetes.  He does not use insulin.    Allergies:  Allergies   Allergen Reactions    Hydrochlorothiazide      Gout flares    Hydrocodone-Acetaminophen Hives and Swelling    Naproxen GI Disturbance     Other reaction(s): GI Upset       Problem List:    Patient Active Problem List    Diagnosis Date Noted    BMI 45.0-49.9, adult (H) 12/01/2023     Priority: Medium    Essential hypertension 04/27/2023     Priority: Medium    Type 2 diabetes mellitus with hyperglycemia, without long-term current use of insulin (H) 04/27/2023     Priority: Medium    Gout 04/27/2023     Priority: Medium    Postoperative eye state 03/30/2023     Priority: Medium     Added automatically from request for surgery [id number]      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     Priority: Medium     Added automatically from request for surgery [id number]          Past Medical History:    Past Medical History:   Diagnosis Date    Diabetes mellitus, type 2 (H)     HTN (hypertension)     Hyperlipidemia LDL goal <70     Stable keratoconus of both eyes        Past Surgical History:    Past Surgical History:   Procedure Laterality Date    COLONOSCOPY N/A 3/14/2024    Procedure: COLONOSCOPY, WITH POLYPECTOMY AND BIOPSY;  Surgeon: Zaire Rome MD;  Location: [facility]    KERATOPLASTY DESCEMETS MEMBRANE ENDOTHELIAL (DMEK) Right 3/29/2023    Procedure: ATTEMPTED DESCEMET'S MEMBRANE  "ENDOTHELIAL KERATOPLASTY (DMEK) RIGHT, VIEW OBSCURED, PROCEDURE ABORTED.;  Surgeon: Mick Paul MD;  Location: [facility]    [place] ANESTH,CORNEAL TRANSPLANT         Family History:    Family History   Problem Relation Age of Onset    Hypertension Mother     Hyperlipidemia Mother     Hypertension Brother     Hypertension Brother     Heart Failure Maternal Grandmother     Diabetes Maternal Grandfather     Liver Cancer Paternal Grandmother     Glaucoma No family hx of     Macular Degeneration No family hx of        Social History:  Marital Status:   [2]  Social History     Tobacco Use    Smoking status: Never    Smokeless tobacco: Never   Vaping Use    Vaping status: Never Used   Substance Use Topics    Alcohol use: Yes     Alcohol/week: 10.0 standard drinks of alcohol     Types: 10 Standard drinks or equivalent per week     Comment: occasional    Drug use: Never        Medications:    predniSONE (DELTASONE) 20 MG tablet  alcohol swab prep pads  amLODIPine (NORVASC) 10 MG tablet  atorvastatin (LIPITOR) 40 MG tablet  blood glucose (NO BRAND SPECIFIED) test strip  blood glucose monitoring (NO BRAND SPECIFIED) meter device kit  erythromycin (ROMYCIN) 5 MG/GM ophthalmic ointment  fluticasone (FLONASE) 50 MCG/ACT nasal spray  lisinopril (ZESTRIL) 40 MG tablet  metFORMIN (GLUCOPHAGE XR) 500 MG 24 hr tablet  metoprolol succinate ER (TOPROL XL) 50 MG 24 hr tablet  prednisoLONE acetate (PRED FORTE) 1 % ophthalmic suspension  Semaglutide, 1 MG/DOSE, (OZEMPIC) 4 MG/3ML pen  Semaglutide, 2 MG/DOSE, (OZEMPIC) 8 MG/3ML pen  sodium chloride 0.9 % neb solution  tadalafil (CIALIS) 2.5 MG tablet  thin (NO BRAND SPECIFIED) lancets      Review of Systems  All other systems are reviewed and are negative    Physical Exam   BP: (!) 156/94  Pulse: 78  Temp: 97.6  F (36.4  C)  Resp: 18  Height: 180.3 cm (5' 11\")  Weight: 145.2 kg (320 lb)  SpO2: 98 %      Physical Exam    Nursing note and vitals were reviewed.  Constitutional: " Awake and alert, adequately nourished and developed appearing 53-year-old in moderate discomfort, who does not appear acutely ill, and who answers questions appropriately and cooperates with examination.  HEENT: Voice quality normal.  Speech fluent.  PERRL.  EOMI.   Pulmonary/Chest: Breathing is unlabored.    Musculoskeletal: Extremities are warm and well-perfused.  There is erythema tenderness warmth and swelling of the first MTP joint especially on the medial surface of the right great toe.  No other inflammation of other joints.  Neurological: Alert, oriented, thought content logical, coherent   Skin: Warm, dry, no rashes in the area of pain.  Psychiatric: Affect broad and appropriate.    ED Course        Procedures           Critical Care time:  none            Results for orders placed or performed during the hospital encounter of 05/04/24 (from the past 24 hour(s))   Foot  XR, G/E 3 views, right    Narrative    EXAM: XR FOOT RIGHT G/E 3 VIEWS  LOCATION: [facility]  DATE: 05/04/2024    INDICATION: 1st MTP joint pain, redness, swelling.  COMPARISON: None.      Impression    IMPRESSION: There is no cortical destructive change to suggest osteomyelitis with attention to the first toe. Soft tissue swelling first toe.    Scattered arthritic changes of the foot which are moderate in the midfoot. Apparent lucency along the distal calcaneus on the lateral view is nonspecific but favored to be projectional. Atherosclerotic vascular calcifications. No Lisfranc subluxation.         Medications - No data to display    Assessments & Plan (with Medical Decision Making)     53-year-old male presented with sudden onset of pain redness and swelling of the right first MTP joint.  Symptoms are typical of acute gout which she has had in the past.  I have a low suspicion for an infected joint with no history of skin injury and no systemic symptoms of illness and no lymphangitic streaking but if there is  not rapid improvement with appropriate treatment he should seek immediate reevaluation.  An x-ray reviewed by me independently as well as the radiologist shows subchondral eburnation of the first MTP joint with some cystic changes in the joint as well but no evidence of destructive changes to suggest osteomyelitis.  I recommended prednisone 40 mg in the morning with food for 5 days.  He can add acetaminophen 1000 mg 4 times per day if needed.  He can add ibuprofen 400 mg 3 times per day in the afternoon and evening but should not take it with the prednisone in the morning.  He should be seen if he does not have rapid improvement in 12 to 24 hours or has any worsening at any time.  He expressed understanding of the assessment and instructions and his questions were all answered.    I have reviewed the nursing notes.    I have reviewed the findings, diagnosis, plan and need for follow up with the patient.         New Prescriptions    PREDNISONE (DELTASONE) 20 MG TABLET    Take two tablets (= 40mg) each day for 5 (five) days       Final diagnoses:   Arthritis of first metatarsophalangeal (MTP) joint of right foot       5/4/2024   [facility] EMERGENCY DEPT       Elliot Flor MD  05/04/24 [id number]

## 2024-05-04 NOTE — DISCHARGE INSTRUCTIONS
I suspect that this is due to gout.  Take prednisone, 20 mg, 2 pills in the morning with food for 5 days.  You can stop after 3 days if the symptoms have resolved.  If you are having frequent gout attacks you can take medication to prevent them and can discuss this with your primary care physician.  If the symptoms do not improve in 12 to 24 hours, you need to return for reevaluation.  Expect the prednisone to raise your blood sugar while you are on it but this should normalize once you are off.  You can add acetaminophen 1000 mg 4 times per day if needed for pain.  You can add ibuprofen 400 mg 3 times per day if needed for pain in the afternoon and evening but do not take it with the prednisone.

## 2024-05-11 DIAGNOSIS — Z98.890 POSTOPERATIVE EYE STATE: ICD-10-CM

## 2024-05-13 DIAGNOSIS — I10 ESSENTIAL HYPERTENSION: ICD-10-CM

## 2024-05-13 DIAGNOSIS — E11.65 TYPE 2 DIABETES MELLITUS WITH HYPERGLYCEMIA, WITHOUT LONG-TERM CURRENT USE OF INSULIN (H): ICD-10-CM

## 2024-05-13 DIAGNOSIS — E78.5 HYPERLIPIDEMIA LDL GOAL <70: ICD-10-CM

## 2024-05-13 RX ORDER — ATORVASTATIN CALCIUM 40 MG/1
40 TABLET, FILM COATED ORAL DAILY
Qty: 90 TABLET | Refills: 0 | Status: SHIPPED | OUTPATIENT
Start: 2024-05-13 | End: 2024-07-31

## 2024-05-13 RX ORDER — AMLODIPINE BESYLATE 10 MG/1
10 TABLET ORAL DAILY
Qty: 90 TABLET | Refills: 2 | Status: SHIPPED | OUTPATIENT
Start: 2024-05-13

## 2024-05-13 RX ORDER — METOPROLOL SUCCINATE 50 MG/1
50 TABLET, EXTENDED RELEASE ORAL 2 TIMES DAILY
Qty: 180 TABLET | Refills: 2 | Status: SHIPPED | OUTPATIENT
Start: 2024-05-13

## 2024-05-13 NOTE — TELEPHONE ENCOUNTER
metoprolol succinate ER (TOPROL XL) 50 MG 24 hr tablet   amLODIPine (NORVASC) 10 MG tablet   blood glucose (NO BRAND SPECIFIED) test strip     atorvastatin (LIPITOR) 40 MG tablet

## 2024-05-13 NOTE — TELEPHONE ENCOUNTER
Medication: prednisoLONE Acetate 1 % Ophthalmic Suspension     Requested directions: INSTILL 1 DROP INTO RIGHT EYE 4 TIMES DAILY   Current directions on the medication list: Route: Place 1 drop into the right eye 5 times daily - Right Eye    Last Written Prescription Date:  6-1-23  Last Fill Quantity: 10 ml,   # refills: 11    Last Office Visit: 4-16-24  Future Office visit: 5-16-24    Attending Provider: Humberto  Last Clinic Note: PLAN:  - Reduce pred forte tid right eye.     Routing refill request to provider for review/approval because:    Not on protocol  Requires provider review  Inconsistent dose/directions

## 2024-05-15 RX ORDER — PREDNISOLONE ACETATE 10 MG/ML
1 SUSPENSION/ DROPS OPHTHALMIC 3 TIMES DAILY
Qty: 5 ML | Refills: 0 | Status: SHIPPED | OUTPATIENT
Start: 2024-05-15

## 2024-05-16 ENCOUNTER — OFFICE VISIT (OUTPATIENT)
Dept: OPHTHALMOLOGY | Facility: CLINIC | Age: 54
End: 2024-05-16
Attending: OPHTHALMOLOGY
Payer: COMMERCIAL

## 2024-05-16 DIAGNOSIS — H18.711 CORNEAL ECTASIA OF RIGHT EYE: ICD-10-CM

## 2024-05-16 DIAGNOSIS — Z98.890 POSTOPERATIVE EYE STATE: ICD-10-CM

## 2024-05-16 DIAGNOSIS — H52.211 IRREGULAR ASTIGMATISM OF RIGHT EYE: ICD-10-CM

## 2024-05-16 DIAGNOSIS — Q12.0: ICD-10-CM

## 2024-05-16 PROCEDURE — 92025 CPTRIZED CORNEAL TOPOGRAPHY: CPT | Performed by: OPHTHALMOLOGY

## 2024-05-16 PROCEDURE — 99214 OFFICE O/P EST MOD 30 MIN: CPT | Performed by: OPHTHALMOLOGY

## 2024-05-16 PROCEDURE — 99213 OFFICE O/P EST LOW 20 MIN: CPT | Performed by: OPHTHALMOLOGY

## 2024-05-16 ASSESSMENT — VISUAL ACUITY
OD_SC+: -2
OS_PH_CC+: -1
OD_SC: 20/60
CORRECTION_TYPE: CONTACTS
METHOD: SNELLEN - LINEAR
OS_CC+: -1
OS_PH_CC: 20/30
OS_CC: 20/50

## 2024-05-16 ASSESSMENT — PACHYMETRY
OS_CT(UM): 299
OD_CT(UM): 599

## 2024-05-16 ASSESSMENT — SLIT LAMP EXAM - LIDS
COMMENTS: NORMAL
COMMENTS: NORMAL

## 2024-05-16 ASSESSMENT — TONOMETRY
OD_IOP_MMHG: 11
IOP_METHOD: ICARE
OS_IOP_MMHG: CL

## 2024-05-16 NOTE — PROGRESS NOTES
HPI: Mr. Gleason was born in 1970 with Hx of keratoconus. right eye corneal transplant in 2010 - surgeon unknown by pt.. Now right eye can not wear CL without corneal edema developing.   Pt started to wear glasses at age 5.  Pt states that left eye has changed yearly.  No old records available.    Interval 4/6/24: POM12 s/p right eye PKP. Doing well. No new changes in vision or new concerns. Right eye is comfortable. Using prednisolone QID and and ointment at bedtime right eye.     CL: scleral lenses left eye   Can wear CL right eye due to corneal edema developing.   Glasses - none  Surgery : right eye PKP 2009 & 2010 and 4/6/23    Meds:    - Prednisolone QID right eye    - Erythromycin ointment QID OD        A/P:  # Keratoconus each eye  # History of right eye PKPx2 with failing graft  # S/p right eye attempted DMEK 3/29/23, aborted due to obstructed view from corneal edema (see OP note).  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 Disc possible need for pupiloplasty, disc r, b and PC for future surgery  - right eye cataract - disc cataract extraction after optimaization with suture removal of PKP right eye today (3/12/24)    5/16/24; Pt with higher cykliner temporallt (see ade). Removed 2 sutures temproally and inf temp. Oflox right eye qid x 4 days.    PLAN:  - Reduce pred forte tid right eye.   - continue erythromycin qid  - Removed 2suture  - based on tolo today. Start oflocx right eye qid X  4 days.    RTC:  1 months VT- and ADE OD call sooner with any problems     Mick Paul MD    Attending Physician Attestation:  Complete documentation of historical and exam elements from today's encounter can be found in the full encounter summary report (not reduplicated in this progress note).  I personally obtained the chief complaint(s) and history of present illness.  I confirmed and edited as necessary the review of systems,  past medical/surgical history, family history, social history, and examination findings as documented by others; and I examined the patient myself.  I personally reviewed the relevant tests, images, and reports as documented above.  I formulated and edited as necessary the assessment and plan and discussed the findings and management plan with the patient and family. - Mick Paul MD

## 2024-05-16 NOTE — NURSING NOTE
Chief Complaints and History of Present Illnesses   Patient presents with    Cornea Transplant Follow Up     1 month follow up for PK right eye x 2 with failing graft. Hx attempted DMEK right eye, aborted due to obstructed view from corneal edema. Hx KCN each eye.    Patient notes vision is slightly improved with right eye over the last month.  Patient notes compliant with eye medications.          Chief Complaint(s) and History of Present Illness(es)       Cornea Transplant Follow Up              Laterality: right eye    Onset: months ago    Associated symptoms: Negative for eye pain, redness and photophobia    Treatments tried: eye drops and ointment    Pain scale: 0/10    Comments: 1 month follow up for PK right eye x 2 with failing graft. Hx attempted DMEK right eye, aborted due to obstructed view from corneal edema. Hx KCN each eye.    Patient notes vision is slightly improved with right eye over the last month.  Patient notes compliant with eye medications.                   Comments    Ocular meds:   - Prednisolone TID right eye   - EES roxanne once at night     FERNANDO Simpson 10:51 AM 05/16/2024

## 2024-06-10 ENCOUNTER — OFFICE VISIT (OUTPATIENT)
Dept: OPHTHALMOLOGY | Facility: CLINIC | Age: 54
End: 2024-06-10
Attending: OPHTHALMOLOGY
Payer: COMMERCIAL

## 2024-06-10 DIAGNOSIS — Q13.2 CORECTOPIA: ICD-10-CM

## 2024-06-10 DIAGNOSIS — H25.11 NUCLEAR SENILE CATARACT OF RIGHT EYE: Primary | ICD-10-CM

## 2024-06-10 DIAGNOSIS — H18.613 STABLE KERATOCONUS OF BOTH EYES: ICD-10-CM

## 2024-06-10 PROCEDURE — 76519 ECHO EXAM OF EYE: CPT | Performed by: OPHTHALMOLOGY

## 2024-06-10 PROCEDURE — 99214 OFFICE O/P EST MOD 30 MIN: CPT | Performed by: OPHTHALMOLOGY

## 2024-06-10 PROCEDURE — 99211 OFF/OP EST MAY X REQ PHY/QHP: CPT | Mod: 25 | Performed by: OPHTHALMOLOGY

## 2024-06-10 PROCEDURE — 92025 CPTRIZED CORNEAL TOPOGRAPHY: CPT | Performed by: OPHTHALMOLOGY

## 2024-06-10 ASSESSMENT — EXTERNAL EXAM - RIGHT EYE: OD_EXAM: NORMAL

## 2024-06-10 ASSESSMENT — TONOMETRY
OS_IOP_MMHG: SCLERAL LENS
IOP_METHOD: ICARE
OD_IOP_MMHG: 13

## 2024-06-10 ASSESSMENT — VISUAL ACUITY
OS_CC: 20/50-
OD_PH_SC: 20/100-
OD_SC: 20/125-
CORRECTION_TYPE: CONTACTS

## 2024-06-10 ASSESSMENT — SLIT LAMP EXAM - LIDS
COMMENTS: NORMAL
COMMENTS: NORMAL

## 2024-06-10 ASSESSMENT — EXTERNAL EXAM - LEFT EYE: OS_EXAM: NORMAL

## 2024-06-10 NOTE — PROGRESS NOTES
Chief complaint   Post PK    HPI    Tino Gleason 53 year old male       Chief Complaint(s) and History of Present Illness(es)       Post Op (Ophthalmology) Right Eye    In right eye.             Comments    S/p PKP 4/6/23    He hasn't noticed any changes to vision since LV here, feels things are stable.  Denies eye pain.      Oc meds:  Pred forte tid right eye  EES roxanne at bedtime right eye     Kelsey Givens, COT 9:20 AM 06/10/2024                         Interval hx 06/10/2024  Chief Complaint(s) and History of Present Illness(es)       Post Op (Ophthalmology) Right Eye    In right eye.             Comments    S/p PKP 4/6/23    He hasn't noticed any changes to vision since LV here, feels things are stable.  Denies eye pain.      Oc meds:  Pred forte tid right eye  EES roxanne at bedtime right eye     Kelsey Reyester, COT 9:20 AM 06/10/2024                           Past ocular history   Prior eye surgery/laser/Trauma: Pkx1 and 1 dmek  CTL wearer:No  Glasses : -  Family Hx of eye disease: -    PMH     Past Medical History:   Diagnosis Date    Diabetes mellitus, type 2 (H)     HTN (hypertension)     Hyperlipidemia LDL goal <70     Stable keratoconus of both eyes        PSH     Past Surgical History:   Procedure Laterality Date    COLONOSCOPY N/A 3/14/2024    Procedure: COLONOSCOPY, WITH POLYPECTOMY AND BIOPSY;  Surgeon: Zaire Rome MD;  Location: [facility]    KERATOPLASTY DESCEMETS MEMBRANE ENDOTHELIAL (DMEK) Right 3/29/2023    Procedure: ATTEMPTED DESCEMET'S MEMBRANE ENDOTHELIAL KERATOPLASTY (DMEK) RIGHT, VIEW OBSCURED, PROCEDURE ABORTED.;  Surgeon: Mick Paul MD;  Location: [facility]    [place] ANESTH,CORNEAL TRANSPLANT         Meds     Current Outpatient Medications   Medication Sig Dispense Refill    alcohol swab prep pads Use to swab area of injection/finn as directed. 100 each 3    amLODIPine (NORVASC) 10 MG tablet Take 1 tablet (10 mg) by mouth daily 90 tablet 2    atorvastatin (LIPITOR) 40 MG  tablet Take 1 tablet (40 mg) by mouth daily Need appointment for further refills 90 tablet 0    blood glucose (NO BRAND SPECIFIED) test strip Use to test blood sugar 3-4 times daily or as directed. To accompany: Blood Glucose Monitor Brands: per insurance. 100 strip 6    blood glucose monitoring (NO BRAND SPECIFIED) meter device kit Use to test blood sugar 3-4 times daily or as directed. Preferred blood glucose meter OR supplies to accompany: Blood Glucose Monitor Brands: per insurance. 1 kit 0    erythromycin (ROMYCIN) 5 MG/GM ophthalmic ointment Place 0.5 inches into the right eye 2 times daily 7 g 4    fluticasone (FLONASE) 50 MCG/ACT nasal spray as needed      lisinopril (ZESTRIL) 40 MG tablet Take 1 tablet (40 mg) by mouth daily 90 tablet 3    metFORMIN (GLUCOPHAGE XR) 500 MG 24 hr tablet Take 1 tablet (500 mg) by mouth 2 times daily (with meals) 180 tablet 1    metoprolol succinate ER (TOPROL XL) 50 MG 24 hr tablet Take 1 tablet (50 mg) by mouth 2 times daily 180 tablet 2    prednisoLONE acetate (PRED FORTE) 1 % ophthalmic suspension Place 1 drop into the right eye 3 times daily 5 mL 0    predniSONE (DELTASONE) 20 MG tablet Take two tablets (= 40mg) each day for 5 (five) days 10 tablet 0    Semaglutide, 1 MG/DOSE, (OZEMPIC) 4 MG/3ML pen Inject 1 mg Subcutaneous every 7 days 3 mL 0    Semaglutide, 2 MG/DOSE, (OZEMPIC) 8 MG/3ML pen Inject 2 mg Subcutaneous every 7 days 9 mL 1    sodium chloride 0.9 % neb solution 3 mLs by Other route 2 times daily Medically necessary for scleral contact lens use 300 mL 11    tadalafil (CIALIS) 2.5 MG tablet Take 4 tablets (10 mg) by mouth daily as needed (sexual intercourse) 20 tablet 3    thin (NO BRAND SPECIFIED) lancets Use to test blood sugar 3-4 times daily or as directed. To accompany: Blood Glucose Monitor Brands: per insurance. 100 each 6     No current facility-administered medications for this visit.       Labs   -    Imaging   -    Drops Currently Taking   Pred  TID    Assessment/Plan 06/10/2024   # # Keratoconus each eye  # History of right eye PKPx2 with failing graft  # S/p right eye attempted DMEK 3/29/23, aborted due to obstructed view from corneal edema (see OP note).  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       PLAN:  - Cont. pred forte TID right eye.   - continue erythromycin qid  To remove all sutures the day of surgery    #Senile Cataracts right eye.  #correctopia OD  Patient is having difficulty with daily activities due to visual impairment. Patient feels that they are no longer managing with current correction and would like to move forward with cataract surgery. Explained benefits alternatives and risks including but not limited to loss of vision, severe infection, retinal detachment, need for more surgery, visual disturbances etc...  Informed patient that reaching uncorrected vision aim (without glasses) after cataract surgery is not certain. Made patient aware they may need glasses, laser vision correction or in worst case scenario, IOL exchange.    Discussed that Iris repair will be done at the same time of surgery  Discussed that sutures will be removed a the same time  Discussed that he will still need CL for vision  Discussed risks of rejection    Dilates well  no PXF  no Flomax  no guttae    -Aim: distance  - dominant eye  -use 45 K  Post op oflox and pred  TO REMOVE SUTURES  WILL NEED IRIS HOOKS    Follow up:  Oph: at surgery    Attending Physician Attestation:  Complete documentation of historical and exam elements from today's encounter can be found in the full encounter summary report (not reduplicated in this progress note).  I personally obtained the chief complaint(s) and history of present illness.  I confirmed and edited as necessary the review of systems, past medical/surgical history, family history, social history, and examination findings as  documented by others; and I examined the patient myself.  I personally reviewed the relevant tests, images, and reports as documented above.  I formulated and edited as necessary the assessment and plan and discussed the findings and management plan with the patient and family. - Jeremy Meza MD

## 2024-06-11 ENCOUNTER — TELEPHONE (OUTPATIENT)
Dept: OPHTHALMOLOGY | Facility: CLINIC | Age: 54
End: 2024-06-11
Payer: COMMERCIAL

## 2024-06-11 PROBLEM — H25.11 NUCLEAR SENILE CATARACT OF RIGHT EYE: Status: ACTIVE | Noted: 2024-06-10

## 2024-06-11 NOTE — TELEPHONE ENCOUNTER
Called patient to schedule surgery with Dr Meza    Spoke with: patient    Date of Surgery: 6/27 (Right)      Patient aware of approximate arrival time: No      Location of surgery: [facility] (Right)      Pre-Op H&P: [facility] at [facility]     Informed patient that they need to call to schedule pre-op H&P with [facility]  within 30 days of surgery date: Yes    Post-Op Appt Dates: 6/28, 7/19     Discussed with patient pre-op RN will call 2-3 days prior to surgery with arrival time and instructions:  Yes    Discussed with patient PAC RN will provide arrival time and instructions for surgery at the time of the appointment: [[place] locations only]: Not Applicable      Standard Surgery Packet Sent: Yes 06/11/24  via [employer]      Surgical Soap Discussed with Patient: No      Additional Information Sent in Packet: Post-op Appointment Itinerary      Informed patient that they will need an adult  to bring patient home from surgery: Yes  : Patient is aware of the need for an adult          Additional Comments:        All patients questions were answered and was instructed to review surgical packet and call back [phone] with any questions or concerns.       Lizzeth Romo on 6/11/2024 at 2:48 PM

## 2024-06-21 ENCOUNTER — ALLIED HEALTH/NURSE VISIT (OUTPATIENT)
Dept: OPHTHALMOLOGY | Facility: CLINIC | Age: 54
End: 2024-06-21
Attending: OPHTHALMOLOGY
Payer: COMMERCIAL

## 2024-06-21 DIAGNOSIS — H25.11 NUCLEAR SENILE CATARACT OF RIGHT EYE: Primary | ICD-10-CM

## 2024-06-21 PROCEDURE — 92025 CPTRIZED CORNEAL TOPOGRAPHY: CPT

## 2024-06-21 PROCEDURE — 99207 PR NO BILLABLE SERVICE THIS VISIT: CPT | Performed by: OPHTHALMOLOGY

## 2024-06-21 PROCEDURE — 92025 CPTRIZED CORNEAL TOPOGRAPHY: CPT | Mod: 26 | Performed by: OPHTHALMOLOGY

## 2024-06-21 PROCEDURE — 76519 ECHO EXAM OF EYE: CPT

## 2024-06-21 PROCEDURE — 999N000103 HC STATISTIC NO CHARGE FACILITY FEE

## 2024-06-21 PROCEDURE — 76519 ECHO EXAM OF EYE: CPT | Mod: 26 | Performed by: OPHTHALMOLOGY

## 2024-06-22 DIAGNOSIS — Z98.890 POSTOPERATIVE EYE STATE: Primary | ICD-10-CM

## 2024-06-22 RX ORDER — MOXIFLOXACIN 5 MG/ML
1 SOLUTION/ DROPS OPHTHALMIC 4 TIMES DAILY
Qty: 3 ML | Refills: 1 | Status: SHIPPED | OUTPATIENT
Start: 2024-06-22

## 2024-06-22 RX ORDER — PREDNISOLONE ACETATE 10 MG/ML
1 SUSPENSION/ DROPS OPHTHALMIC 4 TIMES DAILY
Qty: 5 ML | Refills: 1 | Status: SHIPPED | OUTPATIENT
Start: 2024-06-22 | End: 2024-06-24

## 2024-06-24 ENCOUNTER — OFFICE VISIT (OUTPATIENT)
Dept: FAMILY MEDICINE | Facility: CLINIC | Age: 54
End: 2024-06-24
Payer: COMMERCIAL

## 2024-06-24 VITALS
SYSTOLIC BLOOD PRESSURE: 140 MMHG | HEIGHT: 71 IN | HEART RATE: 74 BPM | TEMPERATURE: 97.6 F | OXYGEN SATURATION: 98 % | BODY MASS INDEX: 44.1 KG/M2 | WEIGHT: 315 LBS | RESPIRATION RATE: 14 BRPM | DIASTOLIC BLOOD PRESSURE: 86 MMHG

## 2024-06-24 DIAGNOSIS — H21.89: ICD-10-CM

## 2024-06-24 DIAGNOSIS — E11.65 TYPE 2 DIABETES MELLITUS WITH HYPERGLYCEMIA, WITHOUT LONG-TERM CURRENT USE OF INSULIN (H): ICD-10-CM

## 2024-06-24 DIAGNOSIS — Z01.818 PREOP GENERAL PHYSICAL EXAM: Primary | ICD-10-CM

## 2024-06-24 DIAGNOSIS — H25.9 SENILE CATARACT OF RIGHT EYE, UNSPECIFIED AGE-RELATED CATARACT TYPE: ICD-10-CM

## 2024-06-24 DIAGNOSIS — I10 ESSENTIAL HYPERTENSION: ICD-10-CM

## 2024-06-24 DIAGNOSIS — H21.501: ICD-10-CM

## 2024-06-24 PROBLEM — H25.11 NUCLEAR SENILE CATARACT OF RIGHT EYE: Status: ACTIVE | Noted: 2024-06-10

## 2024-06-24 PROBLEM — H25.11 NUCLEAR SENILE CATARACT OF RIGHT EYE: Status: RESOLVED | Noted: 2024-06-10 | Resolved: 2024-06-24

## 2024-06-24 LAB
CREAT UR-MCNC: 239.2 MG/DL
HBA1C MFR BLD: 7.2 % (ref 0–5.6)
MICROALBUMIN UR-MCNC: <12 MG/L
MICROALBUMIN/CREAT UR: NORMAL MG/G{CREAT}

## 2024-06-24 PROCEDURE — 82043 UR ALBUMIN QUANTITATIVE: CPT | Performed by: NURSE PRACTITIONER

## 2024-06-24 PROCEDURE — 83036 HEMOGLOBIN GLYCOSYLATED A1C: CPT | Performed by: NURSE PRACTITIONER

## 2024-06-24 PROCEDURE — 99214 OFFICE O/P EST MOD 30 MIN: CPT | Performed by: NURSE PRACTITIONER

## 2024-06-24 PROCEDURE — 82570 ASSAY OF URINE CREATININE: CPT | Performed by: NURSE PRACTITIONER

## 2024-06-24 PROCEDURE — 36415 COLL VENOUS BLD VENIPUNCTURE: CPT | Performed by: NURSE PRACTITIONER

## 2024-06-24 RX ORDER — ERYTHROMYCIN 5 MG/G
0.5 OINTMENT OPHTHALMIC 2 TIMES DAILY
COMMUNITY
Start: 2024-06-24

## 2024-06-24 ASSESSMENT — PAIN SCALES - GENERAL: PAINLEVEL: NO PAIN (0)

## 2024-06-24 NOTE — PATIENT INSTRUCTIONS

## 2024-06-24 NOTE — PROGRESS NOTES
Preoperative Evaluation  [facility]  [address] [place] [address]  Phone: [phone]  Primary Provider: Thea Olvera NP  Pre-op Performing Provider: Thea Olvera NP  Jun 24, 2024 6/24/2024   Surgical Information   What procedure is being done? Right cataract and iris being fixed   Facility or Hospital where procedure/surgery will be performed: [facility]   Who is doing the procedure / surgery? [facility]   Date of surgery / procedure: 06/27/2024   Time of surgery / procedure: tbd   Where do you plan to recover after surgery? at home with family      Fax number for surgical facility: Note does not need to be faxed, will be available electronically in Epic.    Assessment & Plan     The proposed surgical procedure is considered LOW risk.    Preop general physical exam  A1C was 7.2% today and stable for surgery.    Senile cataract of right eye, unspecified age-related cataract type  Stable.    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    Type 2 diabetes mellitus with hyperglycemia, without long-term current use of insulin (H)  A1C and Urine albumin ordered for evaluation of diabetes.  Patient will be notified of results.  - HEMOGLOBIN A1C; Future  - Albumin Random Urine Quantitative with Creat Ratio; Future  - HEMOGLOBIN A1C  - Albumin Random Urine Quantitative with Creat Ratio    BMI 45.0-49.9, adult (H)  Stable.    Essential hypertension  Stable on medication.      Possible Sleep Apnea: Uses CPAP but recently lost cord.  Advised to call [employer] for cord replacement.      - No identified additional risk factors other than previously addressed    Antiplatelet or Anticoagulation Medication Instructions   - Patient is on no antiplatelet or anticoagulation medications.    Additional Medication Instructions   - ACE/ARB: DO NOT TAKE on day of surgery (minimum 11 hours for general anesthesia).   - Beta Blockers: Continue taking on  the day of surgery.   - Statins: Continue taking on the day of surgery.    - metformin: DO NOT TAKE day of surgery.   - GLP-1 Injectable (exenitide, liraglutide, semaglutide, dulaglutide, etc.): DO NOT TAKE 7 days before surgery     Recommendation  Approval given to proceed with proposed procedure, without further diagnostic evaluation.    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  He also has a cataract in the right eye.  He is undergoing repair of the iris and cataract replacement of the lens for cataracts.        6/24/2024   Pre-Op Questionnaire   Have you ever had a heart attack or stroke? No   Have you ever had surgery on your heart or blood vessels, such as a stent placement, a coronary artery bypass, or surgery on an artery in your head, neck, heart, or legs? No   Do you have chest pain with activity? No   Do you have a history of heart failure? No   Do you currently have a cold, bronchitis or symptoms of other infection? No   Do you have a cough, shortness of breath, or wheezing? No   Do you or anyone in your family have previous history of blood clots? No   Do you or does anyone in your family have a serious bleeding problem such as prolonged bleeding following surgeries or cuts? No   Have you ever had problems with anemia or been told to take iron pills? No   Have you had any abnormal blood loss such as black, tarry or bloody stools? No   Have you ever had a blood transfusion? No   Are you willing to have a blood transfusion if it is medically needed before, during, or after your surgery? (!) NO; Patient is  [employer]   Have you or any of your relatives ever had problems with anesthesia? No   Do you have sleep apnea, excessive snoring or daytime drowsiness? (!) YES, snoring per wife; JEY issues with CPAP machine    Do you have a CPAP machine? Yes   Do you have any artifical heart valves or other implanted medical devices like a pacemaker, defibrillator, or continuous glucose monitor? No   Do you have artificial joints? No   Are you allergic to latex? No      Health Care Directive  Patient does not have a Health Care Directive or Living Will: Discussed advance care planning with patient; however, patient declined at this time.    Preoperative Review of    reviewed - no record of controlled substances prescribed.    Status of Chronic Conditions:  DIABETES - Patient has a longstanding history of DiabetesType Type II . Patient is being treated with diet and oral agents, GLP1 injections weekly and denies significant side effects. Control has been fair. Complicating factors include but are not limited to: hypertension, hyperlipidemia, and morbid obesity .     HYPERLIPIDEMIA - Patient has a long history of significant Hyperlipidemia requiring medication for treatment with recent good control. Patient reports no problems or side effects with the medication.     HYPERTENSION - Patient has longstanding history of HTN , currently denies any symptoms referable to elevated blood pressure. Specifically denies chest pain, palpitations, dyspnea, orthopnea, PND or peripheral edema. Blood pressure readings have been in normal range. Current medication regimen is as listed below. Patient denies any side effects of medication.     SLEEP PROBLEM - Patient has a longstanding history of JEY with CPAP but currently have misplaced his cord.  Advised to call company to get cord replacement.    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     Priority: Medium     Added automatically from request for surgery [id number]        Past Medical History:   Diagnosis Date    Diabetes mellitus, type 2 (H)     HTN (hypertension)     Hyperlipidemia LDL goal <70     Stable keratoconus of both eyes      Past Surgical History:   Procedure Laterality Date    COLONOSCOPY N/A 3/14/2024    Procedure: COLONOSCOPY, WITH POLYPECTOMY AND BIOPSY;  Surgeon: Zaire Rome MD;  Location: [facility]    KERATOPLASTY DESCEMETS MEMBRANE ENDOTHELIAL (DMEK) Right 3/29/2023    Procedure: ATTEMPTED DESCEMET'S MEMBRANE ENDOTHELIAL KERATOPLASTY (DMEK) RIGHT, VIEW OBSCURED, PROCEDURE ABORTED.;  Surgeon: Mick Paul MD;  Location: [facility]    AR ANESTH,CORNEAL TRANSPLANT       Current Outpatient Medications   Medication Sig Dispense Refill    alcohol swab prep pads Use to swab area of injection/finn as directed. 100 each 3    amLODIPine (NORVASC) 10 MG tablet Take 1 tablet (10 mg) by mouth daily 90 tablet 2    atorvastatin (LIPITOR) 40 MG tablet Take 1 tablet (40 mg) by mouth daily Need appointment for further refills 90 tablet 0    blood glucose (NO BRAND SPECIFIED) test strip Use to test blood sugar 3-4 times daily or as directed. To accompany: Blood Glucose Monitor Brands: per insurance. 100 strip 6    blood glucose monitoring (NO BRAND SPECIFIED) meter device kit Use to test blood sugar 3-4 times daily or as directed. Preferred blood glucose meter OR supplies to accompany: Blood Glucose Monitor Brands: per insurance. 1 kit 0    fluticasone (FLONASE)  50 MCG/ACT nasal spray as needed      lisinopril (ZESTRIL) 40 MG tablet Take 1 tablet (40 mg) by mouth daily 90 tablet 3    metFORMIN (GLUCOPHAGE XR) 500 MG 24 hr tablet Take 1 tablet (500 mg) by mouth 2 times daily (with meals) 180 tablet 1    metoprolol succinate ER (TOPROL XL) 50 MG 24 hr tablet Take 1 tablet (50 mg) by mouth 2 times daily 180 tablet 2    prednisoLONE acetate (PRED FORTE) 1 % ophthalmic suspension Place 1 drop into the right eye 3 times daily 5 mL 0    predniSONE (DELTASONE) 20 MG tablet Take two tablets (= 40mg) each day for 5 (five) days 10 tablet 0    Semaglutide, 1 MG/DOSE, (OZEMPIC) 4 MG/3ML pen Inject 1 mg Subcutaneous every 7 days 3 mL 0    Semaglutide, 2 MG/DOSE, (OZEMPIC) 8 MG/3ML pen Inject 2 mg Subcutaneous every 7 days 9 mL 1    sodium chloride 0.9 % neb solution 3 mLs by Other route 2 times daily Medically necessary for scleral contact lens use 300 mL 11    tadalafil (CIALIS) 2.5 MG tablet Take 4 tablets (10 mg) by mouth daily as needed (sexual intercourse) 20 tablet 3    thin (NO BRAND SPECIFIED) lancets Use to test blood sugar 3-4 times daily or as directed. To accompany: Blood Glucose Monitor Brands: per insurance. 100 each 6    erythromycin (ROMYCIN) 5 MG/GM ophthalmic ointment Place 0.5 inches into the right eye 2 times daily (Patient not taking: Reported on 6/24/2024) 7 g 4    moxifloxacin (VIGAMOX) 0.5 % ophthalmic solution Apply 1 drop to eye 4 times daily In operative eye (Patient not taking: Reported on 6/24/2024) 3 mL 1    prednisoLONE acetate (PRED FORTE) 1 % ophthalmic suspension Apply 1 drop to eye 4 times daily In operative eye 5 mL 1       Allergies   Allergen Reactions    Hydrochlorothiazide      Gout flares    Hydrocodone-Acetaminophen Hives and Swelling    Naproxen GI Disturbance     Other reaction(s): GI Upset        Social History     Tobacco Use    Smoking status: Never    Smokeless tobacco: Never   Substance Use Topics    Alcohol use: Yes     Alcohol/week: 10.0  "standard drinks of alcohol     Types: 10 Standard drinks or equivalent per week     Comment: occasional     Family History   Problem Relation Age of Onset    Hypertension Mother     Hyperlipidemia Mother     Hypertension Brother     Hypertension Brother     Heart Failure Maternal Grandmother     Diabetes Maternal Grandfather     Liver Cancer Paternal Grandmother     Glaucoma No family hx of     Macular Degeneration No family hx of      History   Drug Use Unknown             Review of Systems  CONSTITUTIONAL: NEGATIVE for fever, chills, change in weight  INTEGUMENTARY/SKIN: NEGATIVE for worrisome rashes, moles or lesions  EYES: POSITIVE for cararact and iris malformation from prior surgery in right eye  ENT/MOUTH: NEGATIVE for ear, mouth and throat problems  RESP: NEGATIVE for significant cough or SOB  CV: NEGATIVE for chest pain, palpitations or peripheral edema  GI: NEGATIVE for nausea, abdominal pain, heartburn, or change in bowel habits  : NEGATIVE for frequency, dysuria, or hematuria  MUSCULOSKELETAL: NEGATIVE for significant arthralgias or myalgia  NEURO: NEGATIVE for weakness, dizziness or paresthesias  ENDOCRINE: NEGATIVE for temperature intolerance, skin/hair changes  HEME: NEGATIVE for bleeding problems  PSYCHIATRIC: NEGATIVE for changes in mood or affect    Objective    BP (!) 140/86   Pulse 74   Temp 97.6  F (36.4  C) (Tympanic)   Resp 14   Ht 1.803 m (5' 11\")   Wt 146.1 kg (322 lb 1.6 oz)   SpO2 98%   BMI 44.92 kg/m     Estimated body mass index is 44.92 kg/m  as calculated from the following:    Height as of this encounter: 1.803 m (5' 11\").    Weight as of this encounter: 146.1 kg (322 lb 1.6 oz).  Physical Exam  GENERAL: alert and no distress  EYES: Eyes grossly normal to inspection, PERRL and conjunctivae and sclerae normal  HENT: ear canals and TM's normal, nose and mouth without ulcers or lesions  NECK: no adenopathy, no asymmetry, masses, or scars  RESP: lungs clear to auscultation - no " rales, rhonchi or wheezes  CV: regular rate and rhythm, normal S1 S2, no S3 or S4, no murmur, click or rub, no peripheral edema  ABDOMEN: soft, nontender, no hepatosplenomegaly, no masses and bowel sounds normal  MS: no gross musculoskeletal defects noted, no edema  SKIN: no suspicious lesions or rashes  NEURO: Normal strength and tone, mentation intact and speech normal  PSYCH: mentation appears normal, affect normal/bright  LYMPH: normal ant/post cervical, supraclavicular nodes    Recent Labs   Lab Test 01/03/24  0738 12/01/23  1106 10/04/23  1322 07/31/23  1352     --  139 141   POTASSIUM 3.9  --  3.8 4.3   CR 1.18*  --  0.95 1.07   A1C  --  7.4*  --  7.8*        Diagnostics  A1C pending.  No EKG required for low risk surgery (cataract, skin procedure, breast biopsy, etc).    Component      Latest Ref Rng 6/24/2024  10:33 AM   Hemoglobin A1C      0.0 - 5.6 % 7.2 (H)       Legend:  (H) High    Revised Cardiac Risk Index (RCRI)  The patient has the following serious cardiovascular risks for perioperative complications:   - No serious cardiac risks = 0 points     RCRI Interpretation: 0 points: Class I (very low risk - 0.4% complication rate)    Signed Electronically by: Thea Olvera NP  Copy of this evaluation report is provided to requesting physician.

## 2024-06-26 ENCOUNTER — ANESTHESIA EVENT (OUTPATIENT)
Dept: SURGERY | Facility: AMBULATORY SURGERY CENTER | Age: 54
End: 2024-06-26
Payer: COMMERCIAL

## 2024-06-26 ASSESSMENT — LIFESTYLE VARIABLES: TOBACCO_USE: 0

## 2024-06-27 ENCOUNTER — ANESTHESIA (OUTPATIENT)
Dept: SURGERY | Facility: AMBULATORY SURGERY CENTER | Age: 54
End: 2024-06-27
Payer: COMMERCIAL

## 2024-06-27 ENCOUNTER — HOSPITAL ENCOUNTER (OUTPATIENT)
Facility: AMBULATORY SURGERY CENTER | Age: 54
Discharge: HOME | End: 2024-06-27
Attending: OPHTHALMOLOGY
Payer: COMMERCIAL

## 2024-06-27 VITALS
TEMPERATURE: 97.4 F | OXYGEN SATURATION: 98 % | BODY MASS INDEX: 44.1 KG/M2 | DIASTOLIC BLOOD PRESSURE: 95 MMHG | WEIGHT: 315 LBS | SYSTOLIC BLOOD PRESSURE: 152 MMHG | HEART RATE: 91 BPM | HEIGHT: 71 IN | RESPIRATION RATE: 20 BRPM

## 2024-06-27 DIAGNOSIS — H25.11 NUCLEAR SENILE CATARACT OF RIGHT EYE: Primary | ICD-10-CM

## 2024-06-27 LAB — GLUCOSE BLDC GLUCOMTR-MCNC: 108 MG/DL (ref 70–99)

## 2024-06-27 PROCEDURE — 66680 REPAIR IRIS & CILIARY BODY: CPT | Performed by: NURSE ANESTHETIST, CERTIFIED REGISTERED

## 2024-06-27 PROCEDURE — 82962 GLUCOSE BLOOD TEST: CPT | Performed by: PATHOLOGY

## 2024-06-27 PROCEDURE — 66982 XCAPSL CTRC RMVL CPLX WO ECP: CPT | Mod: LT | Performed by: OPHTHALMOLOGY

## 2024-06-27 PROCEDURE — 66680 REPAIR IRIS & CILIARY BODY: CPT | Performed by: ANESTHESIOLOGY

## 2024-06-27 PROCEDURE — 66680 REPAIR IRIS & CILIARY BODY: CPT | Mod: XU | Performed by: OPHTHALMOLOGY

## 2024-06-27 PROCEDURE — 66982 XCAPSL CTRC RMVL CPLX WO ECP: CPT | Mod: LT

## 2024-06-27 PROCEDURE — 66680 REPAIR IRIS & CILIARY BODY: CPT | Mod: XU,LT

## 2024-06-27 DEVICE — IMPLANTABLE DEVICE: Type: IMPLANTABLE DEVICE | Site: EYE | Status: FUNCTIONAL

## 2024-06-27 RX ORDER — SODIUM CHLORIDE, SODIUM LACTATE, POTASSIUM CHLORIDE, CALCIUM CHLORIDE 600; 310; 30; 20 MG/100ML; MG/100ML; MG/100ML; MG/100ML
INJECTION, SOLUTION INTRAVENOUS CONTINUOUS
Status: DISCONTINUED | OUTPATIENT
Start: 2024-06-27 | End: 2024-06-28 | Stop reason: HOSPADM

## 2024-06-27 RX ORDER — ONDANSETRON 4 MG/1
4 TABLET, ORALLY DISINTEGRATING ORAL EVERY 30 MIN PRN
Status: DISCONTINUED | OUTPATIENT
Start: 2024-06-27 | End: 2024-06-28 | Stop reason: HOSPADM

## 2024-06-27 RX ORDER — PROPOFOL 10 MG/ML
INJECTION, EMULSION INTRAVENOUS PRN
Status: DISCONTINUED | OUTPATIENT
Start: 2024-06-27 | End: 2024-06-27

## 2024-06-27 RX ORDER — LIDOCAINE 40 MG/G
CREAM TOPICAL
Status: DISCONTINUED | OUTPATIENT
Start: 2024-06-27 | End: 2024-06-28 | Stop reason: HOSPADM

## 2024-06-27 RX ORDER — OXYCODONE HYDROCHLORIDE 5 MG/1
5 TABLET ORAL
Status: DISCONTINUED | OUTPATIENT
Start: 2024-06-27 | End: 2024-06-28 | Stop reason: HOSPADM

## 2024-06-27 RX ORDER — ONDANSETRON 2 MG/ML
INJECTION INTRAMUSCULAR; INTRAVENOUS PRN
Status: DISCONTINUED | OUTPATIENT
Start: 2024-06-27 | End: 2024-06-27

## 2024-06-27 RX ORDER — ONDANSETRON 2 MG/ML
4 INJECTION INTRAMUSCULAR; INTRAVENOUS EVERY 30 MIN PRN
Status: DISCONTINUED | OUTPATIENT
Start: 2024-06-27 | End: 2024-06-28 | Stop reason: HOSPADM

## 2024-06-27 RX ORDER — OXYCODONE HYDROCHLORIDE 5 MG/1
10 TABLET ORAL
Status: DISCONTINUED | OUTPATIENT
Start: 2024-06-27 | End: 2024-06-28 | Stop reason: HOSPADM

## 2024-06-27 RX ORDER — BALANCED SALT SOLUTION 6.4; .75; .48; .3; 3.9; 1.7 MG/ML; MG/ML; MG/ML; MG/ML; MG/ML; MG/ML
SOLUTION OPHTHALMIC PRN
Status: DISCONTINUED | OUTPATIENT
Start: 2024-06-27 | End: 2024-06-27 | Stop reason: HOSPADM

## 2024-06-27 RX ORDER — TRIAMCINOLONE ACETONIDE 40 MG/ML
INJECTION, SUSPENSION INTRA-ARTICULAR; INTRAMUSCULAR PRN
Status: DISCONTINUED | OUTPATIENT
Start: 2024-06-27 | End: 2024-06-27 | Stop reason: HOSPADM

## 2024-06-27 RX ORDER — DEXAMETHASONE SODIUM PHOSPHATE 10 MG/ML
4 INJECTION, SOLUTION INTRAMUSCULAR; INTRAVENOUS
Status: DISCONTINUED | OUTPATIENT
Start: 2024-06-27 | End: 2024-06-28 | Stop reason: HOSPADM

## 2024-06-27 RX ORDER — MOXIFLOXACIN IN NACL,ISO-OS/PF 0.3MG/0.3
SYRINGE (ML) INTRAOCULAR PRN
Status: DISCONTINUED | OUTPATIENT
Start: 2024-06-27 | End: 2024-06-27 | Stop reason: HOSPADM

## 2024-06-27 RX ORDER — PROPARACAINE HYDROCHLORIDE 5 MG/ML
1 SOLUTION/ DROPS OPHTHALMIC ONCE
Status: COMPLETED | OUTPATIENT
Start: 2024-06-27 | End: 2024-06-27

## 2024-06-27 RX ORDER — NALOXONE HYDROCHLORIDE 0.4 MG/ML
0.1 INJECTION, SOLUTION INTRAMUSCULAR; INTRAVENOUS; SUBCUTANEOUS
Status: DISCONTINUED | OUTPATIENT
Start: 2024-06-27 | End: 2024-06-28 | Stop reason: HOSPADM

## 2024-06-27 RX ORDER — TETRACAINE HYDROCHLORIDE 5 MG/ML
SOLUTION OPHTHALMIC PRN
Status: DISCONTINUED | OUTPATIENT
Start: 2024-06-27 | End: 2024-06-27 | Stop reason: HOSPADM

## 2024-06-27 RX ORDER — CYCLOPENTOLAT/TROPIC/PHENYLEPH 1%-1%-2.5%
1 DROPS (EA) OPHTHALMIC (EYE)
Status: COMPLETED | OUTPATIENT
Start: 2024-06-27 | End: 2024-06-27

## 2024-06-27 RX ORDER — LIDOCAINE HYDROCHLORIDE 20 MG/ML
INJECTION, SOLUTION INFILTRATION; PERINEURAL PRN
Status: DISCONTINUED | OUTPATIENT
Start: 2024-06-27 | End: 2024-06-27

## 2024-06-27 RX ORDER — SODIUM CHLORIDE, SODIUM LACTATE, POTASSIUM CHLORIDE, CALCIUM CHLORIDE 600; 310; 30; 20 MG/100ML; MG/100ML; MG/100ML; MG/100ML
INJECTION, SOLUTION INTRAVENOUS CONTINUOUS
Status: CANCELLED | OUTPATIENT
Start: 2024-06-27

## 2024-06-27 RX ADMIN — Medication 1 DROP: at 12:10

## 2024-06-27 RX ADMIN — PROPARACAINE HYDROCHLORIDE 1 DROP: 5 SOLUTION/ DROPS OPHTHALMIC at 12:10

## 2024-06-27 RX ADMIN — SODIUM CHLORIDE, SODIUM LACTATE, POTASSIUM CHLORIDE, CALCIUM CHLORIDE: 600; 310; 30; 20 INJECTION, SOLUTION INTRAVENOUS at 12:35

## 2024-06-27 RX ADMIN — Medication 0.5 MG: at 13:43

## 2024-06-27 RX ADMIN — ONDANSETRON 4 MG: 2 INJECTION INTRAMUSCULAR; INTRAVENOUS at 13:04

## 2024-06-27 RX ADMIN — LIDOCAINE HYDROCHLORIDE 100 MG: 20 INJECTION, SOLUTION INFILTRATION; PERINEURAL at 12:55

## 2024-06-27 RX ADMIN — PROPOFOL 70 MG: 10 INJECTION, EMULSION INTRAVENOUS at 12:55

## 2024-06-27 RX ADMIN — Medication 1 DROP: at 12:33

## 2024-06-27 RX ADMIN — Medication 1 DROP: at 12:15

## 2024-06-27 NOTE — BRIEF OP NOTE
[facility] Brief Operative Note    Pre-operative diagnosis: Nuclear senile cataract of right eye [H25.11]   Post-operative diagnosis Same as pre-op   Procedure: Procedure(s):  RIGHT EYE PHACOEMULSIFICATION, COMPLEX CATARACT, WITH STANDARD INTRAOCULAR LENS IMPLANT INSERTION  REPAIR, IRIS   Surgeon(s): Surgeons and Role:     * Jeremy Meza MD - Primary     * Miryam Enrique MD - Resident - Observing     * Vita Mares MD - Fellow - Assisting   Estimated blood loss: 0 mL    Specimens: * No specimens in log *   Findings: As expected     Vita Mares MD  Cornea and External Disease Fellow  [facility]

## 2024-06-27 NOTE — ANESTHESIA POSTPROCEDURE EVALUATION
Patient: Tino Gleason    Procedure: Procedure(s):  RIGHT EYE PHACOEMULSIFICATION, COMPLEX CATARACT, WITH STANDARD INTRAOCULAR LENS IMPLANT INSERTION  REPAIR, IRIS       Anesthesia Type:  MAC    Note:  Disposition: Outpatient   Postop Pain Control: Uneventful            Sign Out: Well controlled pain   PONV: No   Neuro/Psych: Uneventful            Sign Out: Acceptable/Baseline neuro status   Airway/Respiratory: Uneventful            Sign Out: Acceptable/Baseline resp. status   CV/Hemodynamics: Uneventful            Sign Out: Acceptable CV status; No obvious hypovolemia; No obvious fluid overload   Other NRE: NONE   DID A NON-ROUTINE EVENT OCCUR? No           Last vitals:  Vitals Value Taken Time   /95 06/27/24 1530   Temp 36.3  C (97.4  F) 06/27/24 1530   Pulse 91 06/27/24 1514   Resp 20 06/27/24 1530   SpO2 98 % 06/27/24 1530       Electronically Signed By: Omar Avilez MD  June 27, 2024  3:54 PM

## 2024-06-27 NOTE — OP NOTE
Operative  Report    Patient Name: Tino Gleason    MRN: [id number]    Date of Surgery: 6/27/2024    Surgeon: Jeremy Meza M.D    Assistant surgeon: Vita Mares MD      Preoperative Diagnosis:   Visually significant cataract, left eye  Iridodialysis, left eye  Mydriasis, left eye      Postoperative Diagnosis: Same    Procedure:   Complex Phacoemulsification with intraocular lens implant, left eye  Iridodialysis repair, left eye   Pupilloplasty, left eye     Implant:   Implant Name Type Inv. Item Serial No.  Lot No. LRB No. Used Action   LENS CC60WF 6.0 [employer] UV ASPHERIC BICONVEX IOL - [id number] Lens/Eye Implant LENS CC60WF 6.0 [employer] UV ASPHERIC BICONVEX IOL [id number] [employer]  Right 1 Implanted       Anesthesia Type: MAC+RBB    Estimated Blood Loss: Trace    Complications: None    INDICATIONS FOR PROCEDURE: Patient has a history of visually significant cataract affecting the patient's activity of daily living. After a discussion with the patient, the patient has elected to have the listed procedure(s) to maximize visual potential. All of the appropriate consent forms have been signed and all of the patient's questions have been answered.    DETAILS OF THE PROCEDURE: Patient was identified in the pre operative area and given pre operative eye drops. The patient was then brought into the operating room and intravenous sedation was begun after a time out was performed. The patient was prepped and draped in the usual sterile ophthalmic fashion.     A lid speculum was placed and the operating microscope was brought into position. A paracentesis was made and trypan blue and lidocain and viscoelastic was injected into the anterior chamber. A limited peritomy was made nasally. A scleral flap was created extending to 2 mm posterior to limbus nasally. Two paracenteses were made nasal. Two iris hooks were placed to hold back nasal iris in area  of iridodialysis. A clear corneal incision was made using a keratome blade. A cystotome needle and Utrata forceps were used to create an anterior continuous curvilinear capsulorhexis. Then BSS on a blunt tipped cannula was used for hydrodissection and hydrodelineation. Using the phacoemulsification unit, the nucleus was then removed from the eye. Using irrigation and aspiration, the remaining cortical material was removed from the eye. The capsular bag was infused with viscoelastic material. The intraocular lens was then placed into the capsular bag and secured into position. Next, iridodialysis was repaired using a single horizontal mattress suture with 10-0 prolene and suture was tied in the scleral pocket. Next, due to fixed  mydriasis pupilloplasty was performed using three 10-0 prolene sutures. The remaining viscoelastic material was then removed from the eye via irrigation and aspiration. BSS on a blunt tipped cannula was used to hydrate all of the wounds. A single 10-0 nylon was used to close the main wound. 8-0 vicryl was used to suture the scleral flap and conjunctival peritomy nasally. At the end of the case, the wounds were noted to be watertight, the pupil was noted to be round, the lens appeared to be in good position, and the pressure was palpated to be physiologic. Intracameral moxifloxacin and a subconjunctival injection of Kenalog were administered.     Post operative ointment was applied and the eye was patched and shielded. Patient tolerated procedure and was brought to the recovery area in good condition. Patient will follow in the eye clinic in one day.      Vita Mares MD  Cornea and External Disease Fellow  [facility]

## 2024-06-27 NOTE — ANESTHESIA CARE TRANSFER NOTE
Patient: Tino Gleason    Procedure: Procedure(s):  RIGHT EYE PHACOEMULSIFICATION, COMPLEX CATARACT, WITH STANDARD INTRAOCULAR LENS IMPLANT INSERTION  REPAIR, IRIS       Diagnosis: Nuclear senile cataract of right eye [H25.11]  Diagnosis Additional Information: No value filed.    Anesthesia Type:   MAC     Note:    Oropharynx: oropharynx clear of all foreign objects and spontaneously breathing  Level of Consciousness: awake  Oxygen Supplementation: room air    Independent Airway: airway patency satisfactory and stable  Dentition: dentition unchanged  Vital Signs Stable: post-procedure vital signs reviewed and stable  Report to RN Given: handoff report given  Patient transferred to: Phase II    Handoff Report: Identifed the Patient, Identified the Reponsible Provider, Reviewed the pertinent medical history, Discussed the surgical course, Reviewed Intra-OP anesthesia mangement and issues during anesthesia, Set expectations for post-procedure period and Allowed opportunity for questions and acknowledgement of understanding      Vitals:  Vitals Value Taken Time   BP     Temp     Pulse     Resp     SpO2         Electronically Signed By: TESHA Pina CRNA  June 27, 2024  3:18 PM

## 2024-06-27 NOTE — DISCHARGE INSTRUCTIONS
Post-Operative Instructions     FIRST 24 HOURS AFTER SURGERY:  You are allowed to Tylenol (acetaminophen) 650mg every four hours as needed for pain unless you have liver disease or are allergic to Tylenol..  Continue taking your regular medications and eye drops in the non-operative eye.  Do not remove the metal or plastic shield unless otherwise instructed by your surgeon.  Do not operate a car, motorcycle, or machinery for 24 hours after surgery.  Call ED immediately if you have SEVERE PAIN unrelieved with Tylenol. And  ask for the resident on call.     MEDICATION INSTRUCTIONS:  -Moxifloxacin four times daily operative eye  -Prednisolone four times daily operative eye  -If you feel your eyes are dry and itchy, you can use regular lubricating drops for one month after the surgery. These eye drops can be found over the counter Brand names example: Systane, Refresh. Please choose preservative free drops. To be used four times a day and as needed for 1 month  -Instilling the eye drops directly into the eye.    -If you had previously any glaucoma eye drops, You can remove the cover and instill the drops as prescribed before and after the procedure      GENERAL INSTRUCTIONS:  Hygiene of the operated eye  Wash your hands thoroughly before caring for the eye.  If the lids are sticky or itchy in the morning, debris, or matter can be gently wiped away with a cotton ball moistened with tap water. DO NOT press on the lids or eyeball.     FIVE MINUTES APART. If ointment is prescribed, it should be applied last.  If you have been taking medications in the non-operated eye, continue as they were prescribed.  If you take medications by mouth for a medical problem, continue as they were prescribed (unless otherwise instructed by physician)    EYE PROTECTION  From the time of surgery until the time of your post-operative day 1 appointment, wear the eye shield at all times. Then, wear it whenever sleeping, for 1 week.  Protective  sunglasses may be worn as needed for your comfort, and are suggested for outdoor activities.    ACTIVITIES  Avoid vigorous exertion and heavy lifting for the first week after surgery  You may take a bath or shower, but avoid getting water directly into your eye for one week after surgery, usually by keeping your eyes closed during the bath.  You should discuss driving and traveling with your surgeon. You may ride in a car and fly in an airplane unless otherwise instructed.  Avoid swimming or using a hot tube/sauna/pool/lake for 2 weeks after the surgery.      WHAT TO EXPECT:  Mild irritation and discomfort are normal.    Call the doctor if you experience any of the following:  Severe eye pain  Nausea  Vomiting  Severe headache    [facility]  Home Care Following Anesthesia  For 24 hours after surgery:  Get plenty of rest.  A responsible adult must stay with you for at least 24 hours after you leave the surgery center.  Do not drive or use heavy equipment.  If you have weakness or tingling, don't drive or use heavy equipment until this feeling goes away.   Do not drink alcohol.   Avoid strenuous or risky activities.  Ask for help when climbing stairs.  You may feel lightheaded.  IF so, sit for a few minutes before standing.  Have someone help you get up.   If you have nausea (feel sick to your stomach): Drink only clear liquids such as apple juice, ginger ale, broth or 7-Up.  Rest may also help.  Be sure to drink enough fluids.  Move to a regular diet as you feel able.   You may have a slight fever.  Call the doctor if your fever is over 100 F (37.7 C) (taken under the tongue) or lasts longer than 24 hours.  You may have a dry mouth, a sore throat, muscle aches or trouble sleeping. These should go away after 24 hours.  Do not make important or legal decisions.   It is recommended to avoid smoking.               Tips for taking pain medications  To get the best pain relief  possible, remember these points:  Take pain medications as directed, before pain becomes severe.  Pain medication can upset your stomach: taking it with food may help.  Constipation is a common side effect of pain medication. Drink plenty of  fluids.  Eat foods high in fiber. Take a stool softener if recommended by your doctor or pharmacist.  Do not drink alcohol, drive or operate machinery while taking pain medications.  Ask about other ways to control pain, such as with heat, ice or relaxation.    Tylenol/Acetaminophen Consumption    If you feel your pain relief is insufficient, you may take Tylenol/Acetaminophen in addition to your narcotic pain medication.   Be careful not to exceed 4,000 mg of Tylenol/Acetaminophen in a 24 hour period from all sources.  If you are taking extra strength Tylenol/acetaminophen (500 mg), the maximum dose is 8 tablets in 24 hours.  If you are taking regular strength acetaminophen (325 mg), the maximum dose is 12 tablets in 24 hours.    Call a doctor for any of the following:  Signs of infection (fever, growing tenderness at the surgery site, a large amount of drainage or bleeding, severe pain, foul-smelling drainage, redness, swelling).  It has been over 8 to 10 hours since surgery and you are still not able to urinate (pass water).  Headache for over 24 hours.  Numbness, tingling or weakness the day after surgery (if you had spinal anesthesia).  Signs of Covid-19 infection (temperature over 100 degrees, shortness of breath, cough, loss of taste/smell, generalized body aches, persistent headache, chills, sore throat, nausea/vomiting/diarrhea)  Your doctor is:  Dr. Jeremy Meza, Ophthalmology: [phone]                    Or dial [phone] and ask for the resident on call for:  Ophthalmology  For emergency care, call the:  [facility] Emergency Department: [phone] (TTY for hearing impaired: [phone])

## 2024-06-28 ENCOUNTER — OFFICE VISIT (OUTPATIENT)
Dept: OPHTHALMOLOGY | Facility: CLINIC | Age: 54
End: 2024-06-28
Attending: OPHTHALMOLOGY
Payer: COMMERCIAL

## 2024-06-28 DIAGNOSIS — Z98.890 POSTOPERATIVE EYE STATE: Primary | ICD-10-CM

## 2024-06-28 PROCEDURE — 99214 OFFICE O/P EST MOD 30 MIN: CPT | Performed by: OPHTHALMOLOGY

## 2024-06-28 PROCEDURE — 99024 POSTOP FOLLOW-UP VISIT: CPT | Mod: GC | Performed by: OPHTHALMOLOGY

## 2024-06-28 RX ORDER — PREDNISOLONE ACETATE 10 MG/ML
1-2 SUSPENSION/ DROPS OPHTHALMIC
Qty: 10 ML | Refills: 11 | Status: SHIPPED | OUTPATIENT
Start: 2024-06-28

## 2024-06-28 ASSESSMENT — EXTERNAL EXAM - LEFT EYE: OS_EXAM: NORMAL

## 2024-06-28 ASSESSMENT — EXTERNAL EXAM - RIGHT EYE: OD_EXAM: NORMAL

## 2024-06-28 ASSESSMENT — TONOMETRY
OD_IOP_MMHG: 19
IOP_METHOD: TONOPEN

## 2024-06-28 ASSESSMENT — VISUAL ACUITY
OD_SC+: +1
METHOD: SNELLEN - LINEAR
OD_SC: 20/70

## 2024-06-28 ASSESSMENT — SLIT LAMP EXAM - LIDS: COMMENTS: NORMAL

## 2024-06-28 NOTE — LETTER
June 28, 2024      Tino Gleason  [address] [address] [address]        To Whom It May Concern:    Tino Gleason  was seen on 6/27/24.  Please excuse him  until 7/18/24 due to surgery.    Sincerely,          Jeremy Meza MD

## 2024-06-28 NOTE — NURSING NOTE
Chief Complaints and History of Present Illnesses   Patient presents with    Post Op (Ophthalmology) Right Eye     Pt here for POD#1 s/p PCIOL, complex cataract, with Iridocyclitis repair,  right eye DOS 06/27/2024.     Chief Complaint(s) and History of Present Illness(es)       Post Op (Ophthalmology) Right Eye              Laterality: right eye    Comments: Pt here for POD#1 s/p PCIOL, complex cataract, with Iridocyclitis repair,  right eye DOS 06/27/2024.              Comments    Pt notes he did not sleep well. Pt notes an hour after he left from surgery he was unable to keep anything down for an hour or two. He states he is doing better today- but does still have pain 4/10 and eye has been very excessively tearing. Pt does have eye pain especially after he was vomiting. Pt states he did not call nurses line and just went to lay down.   Pt using:  Pred forte tid right eye  EES roxanne at bedtime right eye    FERNANDO Keating on 6/28/2024 at 8:48 AM

## 2024-06-28 NOTE — PROGRESS NOTES
Chief complaint   Post PK    HPI    Tino Gleason 53 year old male       Chief Complaint(s) and History of Present Illness(es)       Post Op (Ophthalmology) Right Eye    In right eye.             Comments    S/p PKP 4/6/23    He hasn't noticed any changes to vision since LV here, feels things are stable.  Denies eye pain.      Oc meds:  Pred forte tid right eye  EES roxanne at bedtime right eye     Kelsey Givens, COT 9:20 AM 06/10/2024                         Interval hx 06/28/2024  Chief Complaint(s) and History of Present Illness(es)       Post Op (Ophthalmology) Right Eye    In right eye.             Comments    S/p PKP 4/6/23    He hasn't noticed any changes to vision since LV here, feels things are stable.  Denies eye pain.      Oc meds:  Pred forte tid right eye  EES roxanne at bedtime right eye     Kelsey Reyester, COT 9:20 AM 06/10/2024                           Past ocular history   Prior eye surgery/laser/Trauma: Pkx1 and 1 dmek  CTL wearer:No  Glasses : -  Family Hx of eye disease: -    PMH     Past Medical History:   Diagnosis Date    Diabetes mellitus, type 2 (H)     HTN (hypertension)     Hyperlipidemia LDL goal <70     Stable keratoconus of both eyes        PSH     Past Surgical History:   Procedure Laterality Date    COLONOSCOPY N/A 3/14/2024    Procedure: COLONOSCOPY, WITH POLYPECTOMY AND BIOPSY;  Surgeon: Zaire Rome MD;  Location: [facility]    KERATOPLASTY DESCEMETS MEMBRANE ENDOTHELIAL (DMEK) Right 3/29/2023    Procedure: ATTEMPTED DESCEMET'S MEMBRANE ENDOTHELIAL KERATOPLASTY (DMEK) RIGHT, VIEW OBSCURED, PROCEDURE ABORTED.;  Surgeon: Mick Paul MD;  [facility]    PHACOEMULSIFICATION WITH STANDARD INTRAOCULAR LENS IMPLANT Right 6/27/2024    Procedure: RIGHT EYE PHACOEMULSIFICATION, COMPLEX CATARACT, WITH STANDARD INTRAOCULAR LENS IMPLANT INSERTION;  Surgeon: Jeremy Meza MD;  [place] [facility] [place]    AL ANESTH,CORNEAL TRANSPLANT      REPAIR IRIS Right 6/27/2024    Procedure:  REPAIR, IRIS;  Surgeon: Jeremy Meza MD;  [place] [facility] [place]       [facility]     Current Outpatient Medications   Medication Sig Dispense Refill    alcohol swab prep pads Use to swab area of injection/finn as directed. 100 each 3    amLODIPine (NORVASC) 10 MG tablet Take 1 tablet (10 mg) by mouth daily 90 tablet 2    atorvastatin (LIPITOR) 40 MG tablet Take 1 tablet (40 mg) by mouth daily Need appointment for further refills 90 tablet 0    blood glucose (NO BRAND SPECIFIED) test strip Use to test blood sugar 3-4 times daily or as directed. To accompany: Blood Glucose Monitor Brands: per insurance. 100 strip 6    blood glucose monitoring (NO BRAND SPECIFIED) meter device kit Use to test blood sugar 3-4 times daily or as directed. Preferred blood glucose meter OR supplies to accompany: Blood Glucose Monitor Brands: per insurance. 1 kit 0    erythromycin (ROMYCIN) 5 MG/GM ophthalmic ointment Place 0.5 inches into the right eye 2 times daily      fluticasone (FLONASE) 50 MCG/ACT nasal spray as needed      lisinopril (ZESTRIL) 40 MG tablet Take 1 tablet (40 mg) by mouth daily 90 tablet 3    metFORMIN (GLUCOPHAGE XR) 500 MG 24 hr tablet Take 1 tablet (500 mg) by mouth 2 times daily (with meals) 180 tablet 1    metoprolol succinate ER (TOPROL XL) 50 MG 24 hr tablet Take 1 tablet (50 mg) by mouth 2 times daily 180 tablet 2    moxifloxacin (VIGAMOX) 0.5 % ophthalmic solution Apply 1 drop to eye 4 times daily In operative eye (Patient not taking: Reported on 6/24/2024) 3 mL 1    prednisoLONE acetate (PRED FORTE) 1 % ophthalmic suspension Place 1 drop into the right eye 3 times daily 5 mL 0    Semaglutide, 2 MG/DOSE, (OZEMPIC) 8 MG/3ML pen Inject 2 mg Subcutaneous every 7 days 9 mL 1    sodium chloride 0.9 % neb solution 3 mLs by Other route 2 times daily Medically necessary for scleral contact lens use 300 mL 11    tadalafil (CIALIS) 2.5 MG tablet Take 4 tablets (10 mg) by mouth daily as needed (sexual intercourse) 20 tablet  3    thin (NO BRAND SPECIFIED) lancets Use to test blood sugar 3-4 times daily or as directed. To accompany: Blood Glucose Monitor Brands: per insurance. 100 each 6     No current facility-administered medications for this visit.       Labs   -    Imaging   -    Drops Currently Taking   Prednisolone four times daily right eye   Moxifloxacin four times daily right eye     Assessment/Plan 06/28/2024   #Pseudophakia right eye   S/p CEIOL with iridoplasty and PK suture removal right eye 6/27/24   Postoperative restrictions reviewed and work note given  Return precautions reviewed    # # Keratoconus each eye  # History of right eye PKPx2 with failing graft  # S/p right eye attempted DMEK 3/29/23, aborted due to obstructed view from corneal edema (see OP note).  - right eye PKP 4/6/23, doing well, healing very well  S/p phaco IOL 6/27/24 with Iris repair    PLAN:  - Cont. pred forte 6x daily right eye  And moxi QID OS  - continue erythromycin qid    Follow up: 1 week    Thank you for entrusting us with your care  Alis Enrique MD, PGY3  Ophthalmology Resident  [facility]    Attending Physician Attestation:  Complete documentation of historical and exam elements from today's encounter can be found in the full encounter summary report (not reduplicated in this progress note).  I personally obtained the chief complaint(s) and history of present illness.  I confirmed and edited as necessary the review of systems, past medical/surgical history, family history, social history, and examination findings as documented by others; and I examined the patient myself.  I personally reviewed the relevant tests, images, and reports as documented above.  I formulated and edited as necessary the assessment and plan and discussed the findings and management plan with the patient and family. - Jeremy Meza MD

## 2024-07-01 ENCOUNTER — TELEPHONE (OUTPATIENT)
Dept: OPHTHALMOLOGY | Facility: CLINIC | Age: 54
End: 2024-07-01
Payer: COMMERCIAL

## 2024-07-01 NOTE — TELEPHONE ENCOUNTER
Called and spoke to Tino     Provided our fax number -    He will have his employer resend the [facility] paperwork.    Luz Elena Navarro Communication Facilitator on 7/1/2024 at 11:52 AM

## 2024-07-01 NOTE — TELEPHONE ENCOUNTER
Health Call Center    Phone Message    May a detailed message be left on voicemail: yes     Reason for Call: Other: Pt had surgery with Dr. Meza on 6/27. He states his employer faxed [place] paperwork to the clinic on 6/25, but they have not received it back yet. Please call pt with an update on paperwork. Thank you.     Action Taken: Message routed to:  [facility]    Travel Screening: Not Applicable     Date of Service:

## 2024-07-08 ENCOUNTER — OFFICE VISIT (OUTPATIENT)
Dept: OPHTHALMOLOGY | Facility: CLINIC | Age: 54
End: 2024-07-08
Attending: OPHTHALMOLOGY
Payer: COMMERCIAL

## 2024-07-08 DIAGNOSIS — Z98.890 POSTOPERATIVE EYE STATE: Primary | ICD-10-CM

## 2024-07-08 PROCEDURE — 76512 OPH US DX B-SCAN: CPT | Performed by: OPHTHALMOLOGY

## 2024-07-08 PROCEDURE — 99213 OFFICE O/P EST LOW 20 MIN: CPT | Performed by: OPHTHALMOLOGY

## 2024-07-08 PROCEDURE — 99024 POSTOP FOLLOW-UP VISIT: CPT | Performed by: OPHTHALMOLOGY

## 2024-07-08 PROCEDURE — 99207 ULTRASOUND B-SCAN OD (RIGHT EYE): CPT | Mod: 26 | Performed by: OPHTHALMOLOGY

## 2024-07-08 RX ORDER — METHYLPREDNISOLONE 4 MG
TABLET, DOSE PACK ORAL
Qty: 21 TABLET | Refills: 0 | Status: SHIPPED | OUTPATIENT
Start: 2024-07-08 | End: 2024-08-22

## 2024-07-08 RX ORDER — OFLOXACIN 3 MG/ML
1 SOLUTION/ DROPS OPHTHALMIC
Qty: 10 ML | Refills: 11 | Status: SHIPPED | OUTPATIENT
Start: 2024-07-08

## 2024-07-08 ASSESSMENT — VISUAL ACUITY
METHOD: SNELLEN - LINEAR
OS_SC: 20/70
OS_PH_SC+: -2
OD_SC+: -1
OS_PH_SC: 20/40
OD_SC: 20/80
OD_PH_SC+: -1
OD_PH_SC: 20/60
OS_SC+: -1

## 2024-07-08 ASSESSMENT — EXTERNAL EXAM - RIGHT EYE: OD_EXAM: NORMAL

## 2024-07-08 ASSESSMENT — TONOMETRY
OD_IOP_MMHG: 6
IOP_METHOD: ICARE
OS_IOP_MMHG: SCLERAL

## 2024-07-08 ASSESSMENT — EXTERNAL EXAM - LEFT EYE: OS_EXAM: NORMAL

## 2024-07-08 ASSESSMENT — SLIT LAMP EXAM - LIDS: COMMENTS: NORMAL

## 2024-07-08 NOTE — NURSING NOTE
Chief Complaints and History of Present Illnesses   Patient presents with    Post Op (Ophthalmology) Right Eye     RIGHT EYE PHACOEMULSIFICATION, COMPLEX CATARACT, WITH STANDARD INTRAOCULAR LENS IMPLANT INSERTION     Chief Complaint(s) and History of Present Illness(es)       Post Op (Ophthalmology) Right Eye              Laterality: right eye    Onset: 1 week ago    Associated symptoms: eye pain, tearing, headache and photophobia.  Negative for flashes, floaters and foreign body sensation    Pain scale: 8/10    Comments: RIGHT EYE PHACOEMULSIFICATION, COMPLEX CATARACT, WITH STANDARD INTRAOCULAR LENS IMPLANT INSERTION              Comments    Pt states vision is getting better.  Pain RE 8/10 staring last night.  RE light sensitive and tearing.   No flashes/floaters.  Pt is compliant with drops.  No trauma.    DM 2  Pt did not check BS today.  Lab Results       Component                Value               Date                       A1C                      7.2                 06/24/2024                 A1C                      7.4                 12/01/2023                 A1C                      7.8                 07/31/2023                 A1C                      9.8                 04/27/2023                 A1C                      12.6                02/21/2023              FERNANDO Erwin July 8, 2024 9:46 AM

## 2024-07-08 NOTE — PROGRESS NOTES
Chief complaint   Post PK    HPI    Tino Gleason 53 year old male       Chief Complaint(s) and History of Present Illness(es)       Post Op (Ophthalmology) Right Eye    In right eye.             Comments    S/p PKP 4/6/23    He hasn't noticed any changes to vision since LV here, feels things are stable.  Denies eye pain.      Oc meds:  Pred forte tid right eye  EES roxanne at bedtime right eye     Kelsey Givens, COT 9:20 AM 06/10/2024                       Interval hx 07/08/2024  10/10 pain right eye that started last night. Describes pressure and stabbing sensation. Nothing made it better. Constant. Took ibuprofen this AM and now pain 5/10.     Chief Complaint(s) and History of Present Illness(es)       Post Op (Ophthalmology) Right Eye    In right eye.  This started 1 week ago.  Associated symptoms include eye pain, tearing, headache and photophobia.  Negative for flashes, floaters and foreign body sensation.  Pain was noted as 8/10. Additional comments: RIGHT EYE PHACOEMULSIFICATION, COMPLEX CATARACT, WITH STANDARD INTRAOCULAR LENS IMPLANT INSERTION             Comments    Pt states vision is getting better.  Pain RE 8/10 staring last night.  RE light sensitive and tearing.   No flashes/floaters.  Pt is compliant with drops.  No trauma.    DM 2  Pt did not check BS today.  Lab Results       Component                Value               Date                       A1C                      7.2                 06/24/2024                 A1C                      7.4                 12/01/2023                 A1C                      7.8                 07/31/2023                 A1C                      9.8                 04/27/2023                 A1C                      12.6                02/21/2023              FERNANDO Erwin July 8, 2024 9:46 AM                           Past ocular history   Prior eye surgery/laser/Trauma: Pkx1 and 1 dmek  CTL wearer:No  Glasses : -  Family Hx of eye disease: -    PMH      Past Medical History:   Diagnosis Date    Diabetes mellitus, type 2 (H)     HTN (hypertension)     Hyperlipidemia LDL goal <70     Stable keratoconus of both eyes        PSH     Past Surgical History:   Procedure Laterality Date    CATARACT IOL, RT/LT      COLONOSCOPY N/A 03/14/2024    Procedure: COLONOSCOPY, WITH POLYPECTOMY AND BIOPSY;  Surgeon: Zaire Rome MD;  Location: [facility]    KERATOPLASTY DESCEMETS MEMBRANE ENDOTHELIAL (DMEK) Right 03/29/2023    Procedure: ATTEMPTED DESCEMET'S MEMBRANE ENDOTHELIAL KERATOPLASTY (DMEK) RIGHT, VIEW OBSCURED, PROCEDURE ABORTED.;  Surgeon: Mick Paul MD;  [facility]    PHACOEMULSIFICATION WITH STANDARD INTRAOCULAR LENS IMPLANT Right 06/27/2024    Procedure: RIGHT EYE PHACOEMULSIFICATION, COMPLEX CATARACT, WITH STANDARD INTRAOCULAR LENS IMPLANT INSERTION;  Surgeon: Jeremy Meza MD;  [place] [facility] [place]    IA ANESTH,CORNEAL TRANSPLANT      REPAIR IRIS Right 06/27/2024    Procedure: REPAIR, IRIS;  Surgeon: Jeremy Meza MD;  [place] [facility] [place]       [facility]     Current Outpatient Medications   Medication Sig Dispense Refill    alcohol swab prep pads Use to swab area of injection/finn as directed. 100 each 3    amLODIPine (NORVASC) 10 MG tablet Take 1 tablet (10 mg) by mouth daily 90 tablet 2    atorvastatin (LIPITOR) 40 MG tablet Take 1 tablet (40 mg) by mouth daily Need appointment for further refills 90 tablet 0    blood glucose (NO BRAND SPECIFIED) test strip Use to test blood sugar 3-4 times daily or as directed. To accompany: Blood Glucose Monitor Brands: per insurance. 100 strip 6    blood glucose monitoring (NO BRAND SPECIFIED) meter device kit Use to test blood sugar 3-4 times daily or as directed. Preferred blood glucose meter OR supplies to accompany: Blood Glucose Monitor Brands: per insurance. 1 kit 0    erythromycin (ROMYCIN) 5 MG/GM ophthalmic ointment Place 0.5 inches into the right eye 2 times daily      fluticasone (FLONASE) 50 MCG/ACT  nasal spray as needed      lisinopril (ZESTRIL) 40 MG tablet Take 1 tablet (40 mg) by mouth daily 90 tablet 3    metFORMIN (GLUCOPHAGE XR) 500 MG 24 hr tablet Take 1 tablet (500 mg) by mouth 2 times daily (with meals) 180 tablet 1    metoprolol succinate ER (TOPROL XL) 50 MG 24 hr tablet Take 1 tablet (50 mg) by mouth 2 times daily 180 tablet 2    moxifloxacin (VIGAMOX) 0.5 % ophthalmic solution Apply 1 drop to eye 4 times daily In operative eye 3 mL 1    prednisoLONE acetate (PRED FORTE) 1 % ophthalmic suspension Place 1-2 drops into the right eye 6 times daily 10 mL 11    prednisoLONE acetate (PRED FORTE) 1 % ophthalmic suspension Place 1 drop into the right eye 3 times daily 5 mL 0    Semaglutide, 2 MG/DOSE, (OZEMPIC) 8 MG/3ML pen Inject 2 mg Subcutaneous every 7 days 9 mL 1    sodium chloride 0.9 % neb solution 3 mLs by Other route 2 times daily Medically necessary for scleral contact lens use 300 mL 11    tadalafil (CIALIS) 2.5 MG tablet Take 4 tablets (10 mg) by mouth daily as needed (sexual intercourse) 20 tablet 3    thin (NO BRAND SPECIFIED) lancets Use to test blood sugar 3-4 times daily or as directed. To accompany: Blood Glucose Monitor Brands: per insurance. 100 each 6     No current facility-administered medications for this visit.       Labs   -    Imaging   -    Drops Currently Taking   Prednisolone six times daily right eye   Moxifloxacin four times daily right eye   Erythromycin ointment at bedtime right eye    Assessment/Plan 07/08/2024   #Pseudophakia right eye   S/p CEIOL with iridoplasty and PK suture removal right eye 6/27/24   Postoperative restrictions reviewed and work note given  Return precautions reviewed  07/08/24: gradual onset of pain last night over hours. Rates pain 10/10 with pressure and stabbing sensation. Took ibuprofen and helped the pain. Currently pain is 5/10. Exam today with 3+ injection of the right eye. No corkscrew vessels but tender to palpation. Diffuse PEE but  otherwise appropriate postop week 1. No leak at suture site. No corneal edema. No abrasions. No AC inflammation. Etiology of pain is scleritis vs dry eyes. Will obtain b scan today No blurry vision.  Family history of lupus.   DD: surgical pain, scleritis?    # Keratoconus each eye  # History of right eye PKPx2 with failing graft  # S/p right eye attempted DMEK 3/29/23, aborted due to obstructed view from corneal edema (see OP note).  - right eye PKP 4/6/23, doing well, healing very well  S/p phaco IOL 6/27/24 with Iris repair    PLAN:  Exam is within normal limit except for some hyperhemia  Cannot rule out scleritis. Bscan normal  - Cont. pred forte 6x daily right eye  - Continue moxi QID OD  - Start artificial tears QID OD   - Medrol dose pack given today  - continue erythromycin at bedtime     Follow up: 1 week    Diamante Dow MD  PGY-3  Department of Ophthalmology  July 8, 2024 10:34 AM   Attending Physician Attestation:  Complete documentation of historical and exam elements from today's encounter can be found in the full encounter summary report (not reduplicated in this progress note).  I personally obtained the chief complaint(s) and history of present illness.  I confirmed and edited as necessary the review of systems, past medical/surgical history, family history, social history, and examination findings as documented by others; and I examined the patient myself.  I personally reviewed the relevant tests, images, and reports as documented above.  I formulated and edited as necessary the assessment and plan and discussed the findings and management plan with the patient and family. - Jeremy Meza MD

## 2024-07-15 ENCOUNTER — OFFICE VISIT (OUTPATIENT)
Dept: OPHTHALMOLOGY | Facility: CLINIC | Age: 54
End: 2024-07-15
Attending: OPHTHALMOLOGY
Payer: COMMERCIAL

## 2024-07-15 DIAGNOSIS — Z98.890 POSTOPERATIVE EYE STATE: Primary | ICD-10-CM

## 2024-07-15 DIAGNOSIS — H18.613 STABLE KERATOCONUS OF BOTH EYES: ICD-10-CM

## 2024-07-15 PROCEDURE — 99213 OFFICE O/P EST LOW 20 MIN: CPT | Mod: GC | Performed by: OPHTHALMOLOGY

## 2024-07-15 PROCEDURE — 99211 OFF/OP EST MAY X REQ PHY/QHP: CPT | Performed by: OPHTHALMOLOGY

## 2024-07-15 ASSESSMENT — VISUAL ACUITY
OS_SC: 20/100
OD_PH_SC+: -2
OD_PH_SC: 20/50
OS_PH_SC+: -3
METHOD: SNELLEN - LINEAR
OD_SC: 20/70
OS_PH_SC: 20/30

## 2024-07-15 ASSESSMENT — EXTERNAL EXAM - LEFT EYE: OS_EXAM: NORMAL

## 2024-07-15 ASSESSMENT — TONOMETRY
IOP_METHOD: ICARE
OD_IOP_MMHG: 7
OS_IOP_MMHG: SCLERAL

## 2024-07-15 ASSESSMENT — EXTERNAL EXAM - RIGHT EYE: OD_EXAM: NORMAL

## 2024-07-15 ASSESSMENT — SLIT LAMP EXAM - LIDS: COMMENTS: NORMAL

## 2024-07-15 NOTE — PROGRESS NOTES
"Chief complaint   Post PK    HPI    Tino Gleason 53 year old male       Chief Complaint(s) and History of Present Illness(es)       Post Op (Ophthalmology) Right Eye    In right eye.             Comments    S/p PKP 4/6/23    He hasn't noticed any changes to vision since LV here, feels things are stable.  Denies eye pain.      Oc meds:  Pred forte tid right eye  EES roxanne at bedtime right eye     Kelsey Givens, COT 9:20 AM 06/10/2024                           Interval hx 07/15/2024  Chief Complaint(s) and History of Present Illness(es)       Post Op (Ophthalmology) Right Eye    In right eye.  Associated symptoms include Negative for flashes.  Response to treatment was mild improvement.  Pain was noted as 0/10. Additional comments: RIGHT EYE PHACOEMULSIFICATION, COMPLEX CATARACT, WITH STANDARD INTRAOCULAR LENS IMPLANT INSERTION             Comments    Pt noticing \"bubbles\" since having the ultrasound last visit, still there. Pt noticing vision might be getting better. Taking, pred forte 6x daily right eye,  moxi QID right eye, Using ointment at night.    Lab Results       Component                Value               Date                       A1C                      7.2                 06/24/2024                 A1C                      7.4                 12/01/2023                 A1C                      7.8                 07/31/2023                 A1C                      9.8                 04/27/2023                 A1C                      12.6                02/21/2023            Pt does not check BS daily.    Kay Junior 1:44 PM                                    Past ocular history   Prior eye surgery/laser/Trauma: Pkx1 and 1 dmek  CTL wearer:No  Glasses : -  Family Hx of eye disease: -    PMH     Past Medical History:   Diagnosis Date    Diabetes mellitus, type 2 (H)     HTN (hypertension)     Hyperlipidemia LDL goal <70     Stable keratoconus of both eyes        PSH     Past Surgical History: "   Procedure Laterality Date    CATARACT IOL, RT/LT      COLONOSCOPY N/A 03/14/2024    Procedure: COLONOSCOPY, WITH POLYPECTOMY AND BIOPSY;  Surgeon: Zaire Rome MD;  Location: [facility]    KERATOPLASTY DESCEMETS MEMBRANE ENDOTHELIAL (DMEK) Right 03/29/2023    Procedure: ATTEMPTED DESCEMET'S MEMBRANE ENDOTHELIAL KERATOPLASTY (DMEK) RIGHT, VIEW OBSCURED, PROCEDURE ABORTED.;  Surgeon: Mick Paul MD;  [facility]    PHACOEMULSIFICATION WITH STANDARD INTRAOCULAR LENS IMPLANT Right 06/27/2024    Procedure: RIGHT EYE PHACOEMULSIFICATION, COMPLEX CATARACT, WITH STANDARD INTRAOCULAR LENS IMPLANT INSERTION;  Surgeon: Jeremy Meza MD;  [place] [facility] [place] ANESTH,CORNEAL TRANSPLANT      REPAIR IRIS Right 06/27/2024    Procedure: REPAIR, IRIS;  Surgeon: Jeremy Meza MD;  [place] [facility] [place]       [facility]     Current Outpatient Medications   Medication Sig Dispense Refill    alcohol swab prep pads Use to swab area of injection/finn as directed. 100 each 3    amLODIPine (NORVASC) 10 MG tablet Take 1 tablet (10 mg) by mouth daily 90 tablet 2    atorvastatin (LIPITOR) 40 MG tablet Take 1 tablet (40 mg) by mouth daily Need appointment for further refills 90 tablet 0    blood glucose (NO BRAND SPECIFIED) test strip Use to test blood sugar 3-4 times daily or as directed. To accompany: Blood Glucose Monitor Brands: per insurance. 100 strip 6    blood glucose monitoring (NO BRAND SPECIFIED) meter device kit Use to test blood sugar 3-4 times daily or as directed. Preferred blood glucose meter OR supplies to accompany: Blood Glucose Monitor Brands: per insurance. 1 kit 0    erythromycin (ROMYCIN) 5 MG/GM ophthalmic ointment Place 0.5 inches into the right eye 2 times daily      fluticasone (FLONASE) 50 MCG/ACT nasal spray as needed      lisinopril (ZESTRIL) 40 MG tablet Take 1 tablet (40 mg) by mouth daily 90 tablet 3    metFORMIN (GLUCOPHAGE XR) 500 MG 24 hr tablet Take 1 tablet (500 mg) by mouth 2 times daily  (with meals) 180 tablet 1    methylPREDNISolone (MEDROL DOSEPAK) 4 MG tablet therapy pack Follow Package Directions 21 tablet 0    metoprolol succinate ER (TOPROL XL) 50 MG 24 hr tablet Take 1 tablet (50 mg) by mouth 2 times daily 180 tablet 2    moxifloxacin (VIGAMOX) 0.5 % ophthalmic solution Apply 1 drop to eye 4 times daily In operative eye 3 mL 1    ofloxacin (OCUFLOX) 0.3 % ophthalmic solution Place 1 drop into the right eye every 2 hours (while awake) 10 mL 11    prednisoLONE acetate (PRED FORTE) 1 % ophthalmic suspension Place 1-2 drops into the right eye 6 times daily 10 mL 11    prednisoLONE acetate (PRED FORTE) 1 % ophthalmic suspension Place 1 drop into the right eye 3 times daily 5 mL 0    Semaglutide, 2 MG/DOSE, (OZEMPIC) 8 MG/3ML pen Inject 2 mg Subcutaneous every 7 days 9 mL 1    sodium chloride 0.9 % neb solution 3 mLs by Other route 2 times daily Medically necessary for scleral contact lens use 300 mL 11    tadalafil (CIALIS) 2.5 MG tablet Take 4 tablets (10 mg) by mouth daily as needed (sexual intercourse) 20 tablet 3    thin (NO BRAND SPECIFIED) lancets Use to test blood sugar 3-4 times daily or as directed. To accompany: Blood Glucose Monitor Brands: per insurance. 100 each 6     No current facility-administered medications for this visit.       Labs   -    Imaging   -    Drops Currently Taking   Prednisolone six times daily right eye   Moxifloxacin four times daily right eye   Erythromycin ointment at bedtime right eye    Assessment/Plan 07/15/2024   #Pseudophakia right eye   #Concern for scleritis   S/p CEIOL with iridoplasty and PK suture removal right eye 6/27/24   Postoperative restrictions reviewed and work note given  Return precautions reviewed  07/08/24: gradual onset of pain last night over hours. Rates pain 10/10 with pressure and stabbing sensation. Took ibuprofen and helped the pain. Currently pain is 5/10. Exam today with 3+ injection of the right eye. No corkscrew vessels but  tender to palpation. Diffuse PEE but otherwise appropriate postop week 1. No leak at suture site. No corneal edema. No abrasions. No AC inflammation. Etiology of pain is scleritis vs dry eyes. Will obtain b scan today No blurry vision.  Family history of lupus.     07/15/24: reports improved pain to 0/10 today. States pain resolved the day after he started medrol dose pack. VIPUL resolved nasally today but now exposing engorged vessels compared to OS. Will defer scleritis work up for now and consider if second episode.     # Keratoconus each eye  # History of right eye PKPx2 with failing graft  # S/p right eye attempted DMEK 3/29/23, aborted due to obstructed view from corneal edema (see OP note).  - right eye PKP 4/6/23, doing well, healing very well  S/p phaco IOL 6/27/24 with Iris repair    PLAN:  Cannot rule out scleritis. Bscan normal. Ok to monitor for now  - Start pred forte taper to 4-3-2-1 q 1 week  - Stop moxi QID OD  - Continue artificial tears QID OD   - continue erythromycin at bedtime     Follow up: 1 month VT MRX     Diamante Dow MD  PGY-3  Department of Ophthalmology  July 15, 2024 2:21 PM       Attending Physician Attestation:  Complete documentation of historical and exam elements from today's encounter can be found in the full encounter summary report (not reduplicated in this progress note).  I personally obtained the chief complaint(s) and history of present illness.  I confirmed and edited as necessary the review of systems, past medical/surgical history, family history, social history, and examination findings as documented by others; and I examined the patient myself.  I personally reviewed the relevant tests, images, and reports as documented above.  I formulated and edited as necessary the assessment and plan and discussed the findings and management plan with the patient and family. - Jeremy Meza MD

## 2024-07-15 NOTE — PATIENT INSTRUCTIONS
Stop moxifloxacine/ofloxacine  Prednisolone drop three times a day for one week then two times a day for one week then once a day for one week then stop    Continue artificial tear drops 4 times daily  Continue erythromycin ointment at night

## 2024-07-15 NOTE — NURSING NOTE
"Pt noticing \"bubbles\" since having the ultrasound last visit, still there. Pt noticing vision might be getting better. Taking, pred forte 6x daily right eye,  moxi QID right eye, Using ointment at night.    Lab Results       Component                Value               Date                       A1C                      7.2                 06/24/2024                 A1C                      7.4                 12/01/2023                 A1C                      7.8                 07/31/2023                 A1C                      9.8                 04/27/2023                 A1C                      12.6                02/21/2023            Pt does not check BS daily.    Kay Junior 1:44 PM  "

## 2024-07-18 DIAGNOSIS — E11.65 TYPE 2 DIABETES MELLITUS WITH HYPERGLYCEMIA, WITHOUT LONG-TERM CURRENT USE OF INSULIN (H): ICD-10-CM

## 2024-07-18 NOTE — TELEPHONE ENCOUNTER
Pending Prescriptions:                       Disp   Refills    Semaglutide (2 MG/DOSE) (OZEMPIC) 8 MG/3M*9 mL   1            Sig: Inject 2 mg subcutaneously every 7 days

## 2024-07-30 DIAGNOSIS — E11.65 TYPE 2 DIABETES MELLITUS WITH HYPERGLYCEMIA, WITHOUT LONG-TERM CURRENT USE OF INSULIN (H): ICD-10-CM

## 2024-07-30 DIAGNOSIS — E78.5 HYPERLIPIDEMIA LDL GOAL <70: ICD-10-CM

## 2024-07-31 RX ORDER — METFORMIN HCL 500 MG
500 TABLET, EXTENDED RELEASE 24 HR ORAL 2 TIMES DAILY WITH MEALS
Qty: 180 TABLET | Refills: 0 | Status: SHIPPED | OUTPATIENT
Start: 2024-07-31 | End: 2024-08-22

## 2024-07-31 RX ORDER — ATORVASTATIN CALCIUM 40 MG/1
40 TABLET, FILM COATED ORAL DAILY
Qty: 90 TABLET | Refills: 0 | Status: SHIPPED | OUTPATIENT
Start: 2024-07-31

## 2024-07-31 NOTE — TELEPHONE ENCOUNTER
Natalia refill given x 1 patient has upcoming appointment with Thea Olvera NP 8/22/24.  Prescription approved per [facility] Refill Protocol.  Julie Behrendt RN

## 2024-08-04 ENCOUNTER — HEALTH MAINTENANCE LETTER (OUTPATIENT)
Age: 54
End: 2024-08-04

## 2024-08-14 ENCOUNTER — OFFICE VISIT (OUTPATIENT)
Dept: OPHTHALMOLOGY | Facility: CLINIC | Age: 54
End: 2024-08-14
Attending: OPHTHALMOLOGY
Payer: COMMERCIAL

## 2024-08-14 DIAGNOSIS — Z94.7 PENETRATING KERATOPLASTY GRAFT IN PLACE: ICD-10-CM

## 2024-08-14 DIAGNOSIS — Z98.890 POSTOPERATIVE EYE STATE: Primary | ICD-10-CM

## 2024-08-14 DIAGNOSIS — H52.213 IRREGULAR ASTIGMATISM OF BOTH EYES: ICD-10-CM

## 2024-08-14 DIAGNOSIS — Z96.1 PSEUDOPHAKIA OF RIGHT EYE: ICD-10-CM

## 2024-08-14 PROCEDURE — 99024 POSTOP FOLLOW-UP VISIT: CPT | Mod: GC | Performed by: OPHTHALMOLOGY

## 2024-08-14 PROCEDURE — 99211 OFF/OP EST MAY X REQ PHY/QHP: CPT | Performed by: OPHTHALMOLOGY

## 2024-08-14 PROCEDURE — 92015 DETERMINE REFRACTIVE STATE: CPT

## 2024-08-14 ASSESSMENT — CONF VISUAL FIELD
OS_SUPERIOR_NASAL_RESTRICTION: 0
OS_NORMAL: 1
OS_INFERIOR_NASAL_RESTRICTION: 0
OD_NORMAL: 1
OS_SUPERIOR_TEMPORAL_RESTRICTION: 0
OD_SUPERIOR_NASAL_RESTRICTION: 0
OS_INFERIOR_TEMPORAL_RESTRICTION: 0
OD_INFERIOR_TEMPORAL_RESTRICTION: 0
METHOD: COUNTING FINGERS
OD_INFERIOR_NASAL_RESTRICTION: 0
OD_SUPERIOR_TEMPORAL_RESTRICTION: 0

## 2024-08-14 ASSESSMENT — REFRACTION_MANIFEST
OD_ADD: +2.50
OS_SPHERE: +0.25
OS_CYLINDER: +0.75
OD_SPHERE: -1.50
OD_CYLINDER: SPHERE
OS_ADD: +2.50
OS_AXIS: 145

## 2024-08-14 ASSESSMENT — TONOMETRY
OS_IOP_MMHG: 16
OD_IOP_MMHG: 12
IOP_METHOD: TONOPEN

## 2024-08-14 ASSESSMENT — VISUAL ACUITY
OD_SC+: +1
OD_PH_SC+: -2
OD_SC: 20/80
OS_CC: 20/70
METHOD: SNELLEN - LINEAR
OS_CC+: +2
OD_PH_SC: 20/50
OS_PH_CC: 20/40
OS_PH_CC+: -1

## 2024-08-14 ASSESSMENT — EXTERNAL EXAM - LEFT EYE: OS_EXAM: NORMAL

## 2024-08-14 ASSESSMENT — SLIT LAMP EXAM - LIDS
COMMENTS: NORMAL
COMMENTS: NORMAL

## 2024-08-14 ASSESSMENT — EXTERNAL EXAM - RIGHT EYE: OD_EXAM: NORMAL

## 2024-08-14 NOTE — NURSING NOTE
Chief Complaints and History of Present Illnesses   Patient presents with    Follow Up     #Pseudophakia right eye   #Concern for scleritis        Chief Complaint(s) and History of Present Illness(es)       Follow Up              Course: stable    Associated symptoms: Negative for eye pain    Comments: #Pseudophakia right eye   #Concern for scleritis                 Comments    Pt reports stable vision. Denies discomfort.     - Start pred forte taper to 4-3-2-1 q 1 week - taking BID  - Continue artificial tears QID OD - Pt is not taking  - continue erythromycin at bedtime - Pt stopped taking     He is not taking any other drops.     Lab Results       Component                Value               Date                       A1C                      7.2                 06/24/2024            Maddy Asencio OA 9:11 AM August 14, 2024

## 2024-08-14 NOTE — PROGRESS NOTES
"Chief complaint   Post PK  S/p CEIOL     HPI    Tino Gleason 54 year old male       Interval hx 08/14/2024  Chief Complaint(s) and History of Present Illness(es)       Post Op (Ophthalmology) Right Eye    In right eye.  Associated symptoms include Negative for flashes.  Response to treatment was mild improvement.  Pain was noted as 0/10. Additional comments: RIGHT EYE PHACOEMULSIFICATION, COMPLEX CATARACT, WITH STANDARD INTRAOCULAR LENS IMPLANT INSERTION             Comments    Pt noticing \"bubbles\" since having the ultrasound last visit, still there. Pt noticing vision might be getting better. Taking, pred forte 6x daily right eye,  moxi QID right eye, Using ointment at night.    Lab Results       Component                Value               Date                       A1C                      7.2                 06/24/2024                 A1C                      7.4                 12/01/2023                 A1C                      7.8                 07/31/2023                 A1C                      9.8                 04/27/2023                 A1C                      12.6                02/21/2023            Pt does not check BS daily.    Kay Junior 1:44 PM                                    Past ocular history   Prior eye surgery/laser/Trauma: Pkx1 and 1 dmek  CTL wearer:No  Glasses : -  Family Hx of eye disease: -    PMH     Past Medical History:   Diagnosis Date    Diabetes mellitus, type 2 (H)     HTN (hypertension)     Hyperlipidemia LDL goal <70     Stable keratoconus of both eyes        PSH     Past Surgical History:   Procedure Laterality Date    CATARACT IOL, RT/LT      COLONOSCOPY N/A 03/14/2024    Procedure: COLONOSCOPY, WITH POLYPECTOMY AND BIOPSY;  Surgeon: Zaire Rome MD;  Location: [facility]    KERATOPLASTY DESCEMETS MEMBRANE ENDOTHELIAL (DMEK) Right 03/29/2023    Procedure: ATTEMPTED DESCEMET'S MEMBRANE ENDOTHELIAL KERATOPLASTY (DMEK) RIGHT, VIEW OBSCURED, PROCEDURE ABORTED.;  " Surgeon: Mick Paul MD;  [facility]    PHACOEMULSIFICATION WITH STANDARD INTRAOCULAR LENS IMPLANT Right 06/27/2024    Procedure: RIGHT EYE PHACOEMULSIFICATION, COMPLEX CATARACT, WITH STANDARD INTRAOCULAR LENS IMPLANT INSERTION;  Surgeon: Jeremy Meza MD;  [place]: [facility] [place]    [place] ANESTH,CORNEAL TRANSPLANT      REPAIR IRIS Right 06/27/2024    Procedure: REPAIR, IRIS;  Surgeon: Jeremy Meza MD;  Location: [facility] [place]       [facility]     Current Outpatient Medications   Medication Sig Dispense Refill    alcohol swab prep pads Use to swab area of injection/finn as directed. 100 each 3    amLODIPine (NORVASC) 10 MG tablet Take 1 tablet (10 mg) by mouth daily 90 tablet 2    atorvastatin (LIPITOR) 40 MG tablet TAKE 1 TABLET BY MOUTH ONCE DAILY . APPOINTMENT REQUIRED FOR FUTURE REFILLS 90 tablet 0    blood glucose (NO BRAND SPECIFIED) test strip Use to test blood sugar 3-4 times daily or as directed. To accompany: Blood Glucose Monitor Brands: per insurance. 100 strip 6    blood glucose monitoring (NO BRAND SPECIFIED) meter device kit Use to test blood sugar 3-4 times daily or as directed. Preferred blood glucose meter OR supplies to accompany: Blood Glucose Monitor Brands: per insurance. 1 kit 0    fluticasone (FLONASE) 50 MCG/ACT nasal spray as needed      lisinopril (ZESTRIL) 40 MG tablet Take 1 tablet (40 mg) by mouth daily 90 tablet 3    metFORMIN (GLUCOPHAGE XR) 500 MG 24 hr tablet TAKE 1 TABLET BY MOUTH TWICE DAILY WITH MEALS 180 tablet 0    methylPREDNISolone (MEDROL DOSEPAK) 4 MG tablet therapy pack Follow Package Directions 21 tablet 0    metoprolol succinate ER (TOPROL XL) 50 MG 24 hr tablet Take 1 tablet (50 mg) by mouth 2 times daily 180 tablet 2    prednisoLONE acetate (PRED FORTE) 1 % ophthalmic suspension Place 1-2 drops into the right eye 6 times daily 10 mL 11    prednisoLONE acetate (PRED FORTE) 1 % ophthalmic suspension Place 1 drop into the right eye 3 times daily 5 mL 0     Semaglutide, 2 MG/DOSE, (OZEMPIC) 8 MG/3ML pen Inject 2 mg subcutaneously every 7 days 9 mL 0    sodium chloride 0.9 % neb solution 3 mLs by Other route 2 times daily Medically necessary for scleral contact lens use 300 mL 11    tadalafil (CIALIS) 2.5 MG tablet Take 4 tablets (10 mg) by mouth daily as needed (sexual intercourse) 20 tablet 3    thin (NO BRAND SPECIFIED) lancets Use to test blood sugar 3-4 times daily or as directed. To accompany: Blood Glucose Monitor Brands: per insurance. 100 each 6    erythromycin (ROMYCIN) 5 MG/GM ophthalmic ointment Place 0.5 inches into the right eye 2 times daily      moxifloxacin (VIGAMOX) 0.5 % ophthalmic solution Apply 1 drop to eye 4 times daily In operative eye 3 mL 1    ofloxacin (OCUFLOX) 0.3 % ophthalmic solution Place 1 drop into the right eye every 2 hours (while awake) 10 mL 11     No current facility-administered medications for this visit.       Labs   -    Imaging   -    Drops Currently Taking   Prednisolone six times daily right eye   Moxifloxacin four times daily right eye   Erythromycin ointment at bedtime right eye    Assessment/Plan 08/14/2024   #Pseudophakia right eye   #Concern for scleritis   S/p CEIOL with iridoplasty and PK suture removal right eye 6/27/24   Postoperative restrictions reviewed and work note given  Return precautions reviewed  07/08/24: gradual onset of pain last night over hours. Rates pain 10/10 with pressure and stabbing sensation. Took ibuprofen and helped the pain. Currently pain is 5/10. Exam today with 3+ injection of the right eye. No corkscrew vessels but tender to palpation. Diffuse PEE but otherwise appropriate postop week 1. No leak at suture site. No corneal edema. No abrasions. No AC inflammation. Etiology of pain is scleritis vs dry eyes. Will obtain b scan today No blurry vision.  Family history of lupus.     07/15/24: reports improved pain to 0/10 today. States pain resolved the day after he started medrol dose pack. [facility]  resolved nasally today but now exposing engorged vessels compared to OS. Will defer scleritis work up for now and consider if second episode.     08/14/24 : reports stable vision. Exam is stable from prior. Unable to get Mrx today. Irregular astigmatism per pentacam on Meredith 10, 2024     # Keratoconus each eye  # History of right eye PKPx2 with failing graft  # S/p right eye attempted DMEK 3/29/23, aborted due to obstructed view from corneal edema (see OP note).  - right eye PKP 4/6/23, doing well, healing very well  S/p phaco IOL 6/27/24 with Iris repair    No complications noted today    PLAN:  - Continue pred forte daily  - Continue artificial tears QID OD   - continue erythromycin at bedtime     Follow up: with cornea in 6 months, aniyah next available for right CL fitting      Diamante Dow MD  PGY-3  Department of Ophthalmology    Attending Physician Attestation:  Complete documentation of historical and exam elements from today's encounter can be found in the full encounter summary report (not reduplicated in this progress note).  I personally obtained the chief complaint(s) and history of present illness.  I confirmed and edited as necessary the review of systems, past medical/surgical history, family history, social history, and examination findings as documented by others; and I examined the patient myself.  I personally reviewed the relevant tests, images, and reports as documented above.  I formulated and edited as necessary the assessment and plan and discussed the findings and management plan with the patient and family. - Jeremy Meza MD

## 2024-08-22 ENCOUNTER — OFFICE VISIT (OUTPATIENT)
Dept: FAMILY MEDICINE | Facility: CLINIC | Age: 54
End: 2024-08-22
Payer: COMMERCIAL

## 2024-08-22 VITALS
RESPIRATION RATE: 16 BRPM | DIASTOLIC BLOOD PRESSURE: 78 MMHG | OXYGEN SATURATION: 96 % | BODY MASS INDEX: 44.1 KG/M2 | SYSTOLIC BLOOD PRESSURE: 138 MMHG | HEART RATE: 67 BPM | WEIGHT: 315 LBS | TEMPERATURE: 97.2 F | HEIGHT: 71 IN

## 2024-08-22 DIAGNOSIS — J30.1 SEASONAL ALLERGIC RHINITIS DUE TO POLLEN: ICD-10-CM

## 2024-08-22 DIAGNOSIS — E11.65 TYPE 2 DIABETES MELLITUS WITH HYPERGLYCEMIA, WITHOUT LONG-TERM CURRENT USE OF INSULIN (H): ICD-10-CM

## 2024-08-22 DIAGNOSIS — I10 ESSENTIAL HYPERTENSION: ICD-10-CM

## 2024-08-22 DIAGNOSIS — Z00.00 ROUTINE GENERAL MEDICAL EXAMINATION AT A HEALTH CARE FACILITY: Primary | ICD-10-CM

## 2024-08-22 DIAGNOSIS — N52.1 ERECTILE DYSFUNCTION DUE TO DISEASES CLASSIFIED ELSEWHERE: ICD-10-CM

## 2024-08-22 LAB
CHOLEST SERPL-MCNC: 88 MG/DL
FASTING STATUS PATIENT QL REPORTED: NO
HDLC SERPL-MCNC: 31 MG/DL
LDLC SERPL CALC-MCNC: 41 MG/DL
NONHDLC SERPL-MCNC: 57 MG/DL
TRIGL SERPL-MCNC: 80 MG/DL

## 2024-08-22 PROCEDURE — 36415 COLL VENOUS BLD VENIPUNCTURE: CPT | Performed by: NURSE PRACTITIONER

## 2024-08-22 PROCEDURE — 99396 PREV VISIT EST AGE 40-64: CPT | Performed by: NURSE PRACTITIONER

## 2024-08-22 PROCEDURE — 99214 OFFICE O/P EST MOD 30 MIN: CPT | Mod: 25 | Performed by: NURSE PRACTITIONER

## 2024-08-22 PROCEDURE — 80061 LIPID PANEL: CPT | Performed by: NURSE PRACTITIONER

## 2024-08-22 RX ORDER — METFORMIN HCL 500 MG
500 TABLET, EXTENDED RELEASE 24 HR ORAL 2 TIMES DAILY WITH MEALS
Qty: 180 TABLET | Refills: 1 | Status: SHIPPED | OUTPATIENT
Start: 2024-08-22

## 2024-08-22 RX ORDER — TADALAFIL 2.5 MG/1
10 TABLET ORAL DAILY PRN
Qty: 20 TABLET | Refills: 3 | Status: SHIPPED | OUTPATIENT
Start: 2024-08-22

## 2024-08-22 SDOH — HEALTH STABILITY: PHYSICAL HEALTH: ON AVERAGE, HOW MANY DAYS PER WEEK DO YOU ENGAGE IN MODERATE TO STRENUOUS EXERCISE (LIKE A BRISK WALK)?: 0 DAYS

## 2024-08-22 SDOH — HEALTH STABILITY: PHYSICAL HEALTH: ON AVERAGE, HOW MANY MINUTES DO YOU ENGAGE IN EXERCISE AT THIS LEVEL?: 0 MIN

## 2024-08-22 ASSESSMENT — PAIN SCALES - GENERAL: PAINLEVEL: NO PAIN (0)

## 2024-08-22 ASSESSMENT — SOCIAL DETERMINANTS OF HEALTH (SDOH): HOW OFTEN DO YOU GET TOGETHER WITH FRIENDS OR RELATIVES?: ONCE A WEEK

## 2024-08-22 NOTE — PROGRESS NOTES
"Preventive Care Visit  [facility]  Thea Olvera NP, Family Medicine  Aug 22, 2024    Assessment & Plan     Routine general medical examination at a health care facility  Lipid ordered today.  No other screenings due at this time.  Patient declines HIV/Hep C screening due to low risk and he also declines Shingrex after discussion on effectiveness.  I reviewed preventative health recommendations and advised follow-up in 1 year for annual exam.  - REVIEW OF HEALTH MAINTENANCE PROTOCOL ORDERS    Type 2 diabetes mellitus with hyperglycemia, without long-term current use of insulin (H)  Stable.  A1C was mildly elevated over 7% and I would like to get this a little better controlled.  Plan to recheck A1C again at the end of the year and follow-up in 6 months for recheck diabetes in clinic.  Metformin refilled for 6 months.  - Lipid panel reflex to direct LDL Non-fasting; Future  - **Hemoglobin A1c FUTURE 3mo; Future  - PRIMARY CARE FOLLOW-UP SCHEDULING; Future    Essential hypertension  Stable.    BMI 45.0-49.9, adult (H)  Improving on Ozempic.    Erectile dysfunction due to disease classified elsewhere  Refill on Cialis for a year.    Seasonal lallergic rhinitis due to pollen  Advised continued use of Flonase as needed.  Recommended starting Zyrtec 10 mg at bedtime OTC in July until winter is here yearly to reduce symptoms.  If still not improving, I did discuss that we could add Singulair.  Plan to see how he does on the Zyrtec first.    Patient has been advised of split billing requirements and indicates understanding: Yes        BMI  Estimated body mass index is 44.18 kg/m  as calculated from the following:    Height as of this encounter: 1.803 m (5' 11\").    Weight as of this encounter: 143.7 kg (316 lb 12.8 oz).   Weight management plan: Discussed healthy diet and exercise guidelines    Counseling  Appropriate preventive services were addressed with this patient via screening, " questionnaire, or discussion as appropriate for fall prevention, nutrition, physical activity, Tobacco-use cessation, social engagement, weight loss and cognition.  Checklist reviewing preventive services available has been given to the patient.  Reviewed patient's diet, addressing concerns and/or questions.   The patient was instructed to see the dentist every 6 months.   He is at risk for psychosocial distress and has been provided with information to reduce risk.       See Patient Instructions    Amilcar Jiménez is a 54 year old, presenting for the following:  Diabetes, Lipids, Hypertension, and Physical        8/22/2024     7:51 AM   Additional Questions   Roomed by rmb   Accompanied by self         8/22/2024     7:51 AM   Patient Reported Additional Medications   Patient reports taking the following new medications none        Health Care Directive  Patient does not have a Health Care Directive or Living Will: Discussed advance care planning with patient; information given to patient to review.    HPI    Patient states that he has been having seasonal allergies that start in August and go until winter is here.  He is using flonase but just continues to sneeze.  He wants to know if there is anything else he can do.    Diabetes Follow-up    How often are you checking your blood sugar? A few times a month  What time of day are you checking your blood sugars (select all that apply)?  Before meals  Have you had any blood sugars above 200?  No  Have you had any blood sugars below 70?  No  What symptoms do you notice when your blood sugar is low?  None  What concerns do you have today about your diabetes? None   Do you have any of these symptoms? (Select all that apply)  Blurry vision    Hyperlipidemia Follow-Up    Are you regularly taking any medication or supplement to lower your cholesterol?   Yes-    Are you having muscle aches or other side effects that you think could be caused by your cholesterol lowering  medication?  No    Hypertension Follow-up    Do you check your blood pressure regularly outside of the clinic? No   Are you following a low salt diet? No  Are your blood pressures ever more than 140 on the top number (systolic) OR more   than 90 on the bottom number (diastolic), for example 140/90? No    BP Readings from Last 2 Encounters:   08/22/24 138/78   06/27/24 (!) 152/95     Hemoglobin A1C (%)   Date Value   06/24/2024 7.2 (H)   12/01/2023 7.4 (H)     LDL Cholesterol Calculated (mg/dL)   Date Value   07/31/2023 50   04/27/2023 125 (H)         4/27/2023   Nutrition   Three or more servings of calcium each day? Yes   Diet: regular (no restrictions)            4/27/2023   Exercise   Frequency of exercise: 1 day/week            1/9/2024   Social Factors   Worry food won't last until get money to buy more No   Food not last or not have enough money for food? Yes   Do you have housing? (Housing is defined as stable permanent housing and does not include staying ouside in a car, in a tent, in an abandoned building, in an overnight shelter, or couch-surfing.) Yes   Are you worried about losing your housing? Yes   Lack of transportation? No   Unable to get utilities (heat,electricity)? No   Want help with housing or utility concern? (!) YES            4/27/2023   Dental   Dentist two times every year? Yes          Today's PHQ-2 Score:       7/8/2024     9:47 AM   PHQ-2 ( 1999 [employer])   Q1: Little interest or pleasure in doing things 0   Q2: Feeling down, depressed or hopeless 0   PHQ-2 Score 0         4/27/2023   Substance Use   Alcohol more than 3/day or more than 7/wk No        Social History     Tobacco Use    Smoking status: Never    Smokeless tobacco: Never   Vaping Use    Vaping status: Never Used   Substance Use Topics    Alcohol use: Yes     Alcohol/week: 10.0 standard drinks of alcohol     Types: 10 Standard drinks or equivalent per week     Comment: occasional    Drug use: Never     Reviewed and updated as  needed this visit by Provider   Tobacco   Meds  Problems  Med Hx  Surg Hx  Fam Hx  Soc Hx Sexual   Activity          Past Medical History:   Diagnosis Date    Diabetes mellitus, type 2 (H)     HTN (hypertension)     Hyperlipidemia LDL goal <70     Stable keratoconus of both eyes      Past Surgical History:   Procedure Laterality Date    CATARACT IOL, RT/LT      COLONOSCOPY N/A 03/14/2024    Procedure: COLONOSCOPY, WITH POLYPECTOMY AND BIOPSY;  Surgeon: Zaire Rome MD;  Location: [facility]    KERATOPLASTY DESCEMETS MEMBRANE ENDOTHELIAL (DMEK) Right 03/29/2023    Procedure: ATTEMPTED DESCEMET'S MEMBRANE ENDOTHELIAL KERATOPLASTY (DMEK) RIGHT, VIEW OBSCURED, PROCEDURE ABORTED.;  Surgeon: Mick Paul MD;  Location: [facility]    PHACOEMULSIFICATION WITH STANDARD INTRAOCULAR LENS IMPLANT Right 06/27/2024    Procedure: RIGHT EYE PHACOEMULSIFICATION, COMPLEX CATARACT, WITH STANDARD INTRAOCULAR LENS IMPLANT INSERTION;  Surgeon: Jeremy Meza MD;  Location: [facility] OR    AL ANESTH,CORNEAL TRANSPLANT      REPAIR IRIS Right 06/27/2024    Procedure: REPAIR, IRIS;  Surgeon: Jeremy Meza MD;  Location: [facility] OR     Lab work is in process  Labs reviewed in EPIC  BP Readings from Last 3 Encounters:   08/22/24 138/78   06/27/24 (!) 152/95   06/24/24 (!) 140/86    Wt Readings from Last 3 Encounters:   08/22/24 143.7 kg (316 lb 12.8 oz)   06/27/24 146.1 kg (322 lb)   06/24/24 146.1 kg (322 lb 1.6 oz)                  Patient Active Problem List   Diagnosis    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    Postoperative eye state    Essential hypertension    Type 2 diabetes mellitus with hyperglycemia, without long-term current use of insulin (H)    Gout    BMI 45.0-49.9, adult (H)    Senile cataract of right eye, unspecified age-related cataract type    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    Nuclear senile cataract of right eye     Past Surgical History:   Procedure Laterality Date     CATARACT IOL, RT/LT      COLONOSCOPY N/A 03/14/2024    Procedure: COLONOSCOPY, WITH POLYPECTOMY AND BIOPSY;  Surgeon: Zaire Rome MD;  Location: [facility]    KERATOPLASTY DESCEMETS MEMBRANE ENDOTHELIAL (DMEK) Right 03/29/2023    Procedure: ATTEMPTED DESCEMET'S MEMBRANE ENDOTHELIAL KERATOPLASTY (DMEK) RIGHT, VIEW OBSCURED, PROCEDURE ABORTED.;  Surgeon: Mick Paul MD;  [facility]    PHACOEMULSIFICATION WITH STANDARD INTRAOCULAR LENS IMPLANT Right 06/27/2024    Procedure: RIGHT EYE PHACOEMULSIFICATION, COMPLEX CATARACT, WITH STANDARD INTRAOCULAR LENS IMPLANT INSERTION;  Surgeon: Jeremy Meza MD;  Location: [facility] OR    [place] ANESTH,CORNEAL TRANSPLANT      REPAIR IRIS Right 06/27/2024    Procedure: REPAIR, IRIS;  Surgeon: Jeremy Meza MD;  Location: [facility] OR       Social History     Tobacco Use    Smoking status: Never    Smokeless tobacco: Never   Substance Use Topics    Alcohol use: Yes     Alcohol/week: 10.0 standard drinks of alcohol     Types: 10 Standard drinks or equivalent per week     Comment: occasional     Family History   Problem Relation Age of Onset    Hypertension Mother     Hyperlipidemia Mother     Hypertension Brother     Hypertension Brother     Heart Failure Maternal Grandmother     Diabetes Maternal Grandfather     Liver Cancer Paternal Grandmother     Glaucoma No family hx of     Macular Degeneration No family hx of          Current Outpatient Medications   Medication Sig Dispense Refill    alcohol swab prep pads Use to swab area of injection/finn as directed. 100 each 3    amLODIPine (NORVASC) 10 MG tablet Take 1 tablet (10 mg) by mouth daily 90 tablet 2    atorvastatin (LIPITOR) 40 MG tablet TAKE 1 TABLET BY MOUTH ONCE DAILY . APPOINTMENT REQUIRED FOR FUTURE REFILLS 90 tablet 0    blood glucose (NO BRAND SPECIFIED) test strip Use to test blood sugar 3-4 times daily or as directed. To accompany: Blood Glucose Monitor Brands: per insurance. 100 strip 6    blood glucose  monitoring (NO BRAND SPECIFIED) meter device kit Use to test blood sugar 3-4 times daily or as directed. Preferred blood glucose meter OR supplies to accompany: Blood Glucose Monitor Brands: per insurance. 1 kit 0    erythromycin (ROMYCIN) 5 MG/GM ophthalmic ointment Place 0.5 inches into the right eye 2 times daily      fluticasone (FLONASE) 50 MCG/ACT nasal spray as needed      lisinopril (ZESTRIL) 40 MG tablet Take 1 tablet (40 mg) by mouth daily 90 tablet 3    metFORMIN (GLUCOPHAGE XR) 500 MG 24 hr tablet Take 1 tablet (500 mg) by mouth 2 times daily (with meals). 180 tablet 1    metoprolol succinate ER (TOPROL XL) 50 MG 24 hr tablet Take 1 tablet (50 mg) by mouth 2 times daily 180 tablet 2    moxifloxacin (VIGAMOX) 0.5 % ophthalmic solution Apply 1 drop to eye 4 times daily In operative eye 3 mL 1    ofloxacin (OCUFLOX) 0.3 % ophthalmic solution Place 1 drop into the right eye every 2 hours (while awake) 10 mL 11    prednisoLONE acetate (PRED FORTE) 1 % ophthalmic suspension Place 1-2 drops into the right eye 6 times daily 10 mL 11    prednisoLONE acetate (PRED FORTE) 1 % ophthalmic suspension Place 1 drop into the right eye 3 times daily 5 mL 0    Semaglutide, 2 MG/DOSE, (OZEMPIC) 8 MG/3ML pen Inject 2 mg subcutaneously every 7 days 9 mL 0    sodium chloride 0.9 % neb solution 3 mLs by Other route 2 times daily Medically necessary for scleral contact lens use 300 mL 11    tadalafil (CIALIS) 2.5 MG tablet Take 4 tablets (10 mg) by mouth daily as needed (sexual intercourse). 20 tablet 3    thin (NO BRAND SPECIFIED) lancets Use to test blood sugar 3-4 times daily or as directed. To accompany: Blood Glucose Monitor Brands: per insurance. 100 each 6     Allergies   Allergen Reactions    Hydrochlorothiazide      Gout flares    Hydrocodone-Acetaminophen Hives and Swelling    Naproxen GI Disturbance     Other reaction(s): GI Upset     Recent Labs   Lab Test 06/24/24  1033 01/03/24  0738 12/01/23  1106 10/04/23  1322  "07/31/23  1352 04/27/23  0848 02/21/23  1009 04/27/22  1122   A1C 7.2*  --  7.4*  --  7.8* 9.8*   < >  --    LDL  --   --   --   --  50 125*  --  112   HDL  --   --   --   --  36* 28*  --  34*   TRIG  --   --   --   --  106 217*  --  123   ALT  --   --   --   --   --  57*  --  34   CR  --  1.18*  --  0.95 1.07 1.08   < > 1.02   GFRESTIMATED  --  74  --  >90 84 83   < > 90   POTASSIUM  --  3.9  --  3.8 4.3 4.2   < > 4.1    < > = values in this interval not displayed.          Review of Systems  CONSTITUTIONAL: NEGATIVE for fever, chills, change in weight  INTEGUMENTARY/SKIN: NEGATIVE for worrisome rashes, moles or lesions  EYES: POSITIVE for ongoing eye issues following with opthamology  ENT/MOUTH: NEGATIVE for ear, mouth and throat problems  RESP: NEGATIVE for significant cough or SOB  CV: NEGATIVE for chest pain, palpitations or peripheral edema  GI: NEGATIVE for nausea, abdominal pain, heartburn, or change in bowel habits  : positive for and erectile dysfunction  MUSCULOSKELETAL: NEGATIVE for significant arthralgias or myalgia  NEURO: NEGATIVE for weakness, dizziness or paresthesias  ENDOCRINE: NEGATIVE for temperature intolerance, skin/hair changes  HEME/ALLERGY/IMMUNE: NEGATIVE for bleeding problems  PSYCHIATRIC: NEGATIVE for changes in mood or affect  ROS otherwise negative     Objective    Exam  /78   Pulse 67   Temp 97.2  F (36.2  C) (Tympanic)   Resp 16   Ht 1.803 m (5' 11\")   Wt 143.7 kg (316 lb 12.8 oz)   SpO2 96%   BMI 44.18 kg/m     Estimated body mass index is 44.18 kg/m  as calculated from the following:    Height as of this encounter: 1.803 m (5' 11\").    Weight as of this encounter: 143.7 kg (316 lb 12.8 oz).    Physical Exam  GENERAL: alert and no distress  EYES: Eyes grossly normal to inspection, PERRL and conjunctivae and sclerae normal  HENT: ear canals and TM's normal, nose and mouth without ulcers or lesions  NECK: no adenopathy, no asymmetry, masses, or scars  RESP: lungs clear " to auscultation - no rales, rhonchi or wheezes  CV: regular rate and rhythm, normal S1 S2, no S3 or S4, no murmur, click or rub, no peripheral edema  ABDOMEN: soft, nontender, no hepatosplenomegaly, no masses and bowel sounds normal  MS: no gross musculoskeletal defects noted, no edema  SKIN: no suspicious lesions or rashes  NEURO: Normal strength and tone, mentation intact and speech normal  PSYCH: mentation appears normal, affect normal/bright  LYMPH: normal ant/post cervical, supraclavicular nodes  Diabetic foot exam: normal DP and PT pulses, no trophic changes or ulcerative lesions, and normal sensory exam    Signed Electronically by: Thea Olvera NP

## 2024-08-22 NOTE — LETTER
August 27, 2024      Tino Gleason  [address]   [place] [address]        Dear ,    We are writing to inform you of your test results.  Your cholesterol is looking great!!   Resulted Orders   Lipid panel reflex to direct LDL Non-fasting   Result Value Ref Range    Cholesterol 88 <200 mg/dL    Triglycerides 80 <150 mg/dL    Direct Measure HDL 31 (L) >=40 mg/dL    LDL Cholesterol Calculated 41 <=100 mg/dL    Non HDL Cholesterol 57 <130 mg/dL    Patient Fasting > 8hrs? No     Narrative    Cholesterol  Desirable:  <200 mg/dL    Triglycerides  Normal:  Less than 150 mg/dL  Borderline High:  150-199 mg/dL  High:  200-499 mg/dL  Very High:  Greater than or equal to 500 mg/dL    Direct Measure HDL  Female:  Greater than or equal to 50 mg/dL   Male:  Greater than or equal to 40 mg/dL    LDL Cholesterol  Desirable:  <100mg/dL  Above Desirable:  100-129 mg/dL   Borderline High:  130-159 mg/dL   High:  160-189 mg/dL   Very High:  >= 190 mg/dL    Non HDL Cholesterol  Desirable:  130 mg/dL  Above Desirable:  130-159 mg/dL  Borderline High:  160-189 mg/dL  High:  190-219 mg/dL  Very High:  Greater than or equal to 220 mg/dL       If you have any questions or concerns, please call the clinic at the number listed above.       Sincerely,      Thea Olvera NP

## 2024-08-22 NOTE — PATIENT INSTRUCTIONS
Make lab appointment in the next 3 months for A1C recheck.  Continue Flonase as needed.  Start Zyrtec (Cetirizine) 10 mg at bedtime daily until freeze for the winter.  Start again in July next year.  If you still have issues, we can try Singulair daily as well.    Patient Education   Preventive Care Advice   This is general advice given by our system to help you stay healthy. However, your care team may have specific advice just for you. Please talk to your care team about your preventive care needs.  Nutrition  Eat 5 or more servings of fruits and vegetables each day.  Try wheat bread, brown rice and whole grain pasta (instead of white bread, rice, and pasta).  Get enough calcium and vitamin D. Check the label on foods and aim for 100% of the RDA (recommended daily allowance).  Lifestyle  Exercise at least 150 minutes each week  (30 minutes a day, 5 days a week).  Do muscle strengthening activities 2 days a week. These help control your weight and prevent disease.  No smoking.  Wear sunscreen to prevent skin cancer.  Have a dental exam and cleaning every 6 months.  Yearly exams  See your health care team every year to talk about:  Any changes in your health.  Any medicines your care team has prescribed.  Preventive care, family planning, and ways to prevent chronic diseases.  Shots (vaccines)   HPV shots (up to age 26), if you've never had them before.  Hepatitis B shots (up to age 59), if you've never had them before.  COVID-19 shot: Get this shot when it's due.  Flu shot: Get a flu shot every year.  Tetanus shot: Get a tetanus shot every 10 years.  Pneumococcal, hepatitis A, and RSV shots: Ask your care team if you need these based on your risk.  Shingles shot (for age 50 and up)  General health tests  Diabetes screening:  Starting at age 35, Get screened for diabetes at least every 3 years.  If you are younger than age 35, ask your care team if you should be screened for diabetes.  Cholesterol test: At age 39,  start having a cholesterol test every 5 years, or more often if advised.  Bone density scan (DEXA): At age 50, ask your care team if you should have this scan for osteoporosis (brittle bones).  Hepatitis C: Get tested at least once in your life.  STIs (sexually transmitted infections)  Before age 24: Ask your care team if you should be screened for STIs.  After age 24: Get screened for STIs if you're at risk. You are at risk for STIs (including HIV) if:  You are sexually active with more than one person.  You don't use condoms every time.  You or a partner was diagnosed with a sexually transmitted infection.  If you are at risk for HIV, ask about PrEP medicine to prevent HIV.  Get tested for HIV at least once in your life, whether you are at risk for HIV or not.  Cancer screening tests  Cervical cancer screening: If you have a cervix, begin getting regular cervical cancer screening tests starting at age 21.  Breast cancer scan (mammogram): If you've ever had breasts, begin having regular mammograms starting at age 40. This is a scan to check for breast cancer.  Colon cancer screening: It is important to start screening for colon cancer at age 45.  Have a colonoscopy test every 10 years (or more often if you're at risk) Or, ask your provider about stool tests like a FIT test every year or Cologuard test every 3 years.  To learn more about your testing options, visit:   .  For help making a decision, visit:   https://bit.ly/nu47276.  Prostate cancer screening test: If you have a prostate, ask your care team if a prostate cancer screening test (PSA) at age 55 is right for you.  Lung cancer screening: If you are a current or former smoker ages 50 to 80, ask your care team if ongoing lung cancer screenings are right for you.  For informational purposes only. Not to replace the advice of your health care provider. Copyright   2023 "[employer]". All rights reserved. Clinically reviewed by the [facility]  Transitions Program. [employer] [id number] - REV 01/24.

## 2024-08-28 ENCOUNTER — OFFICE VISIT (OUTPATIENT)
Dept: OPTOMETRY | Facility: CLINIC | Age: 54
End: 2024-08-28
Payer: COMMERCIAL

## 2024-08-28 DIAGNOSIS — Z94.7 POST CORNEAL TRANSPLANT: ICD-10-CM

## 2024-08-28 DIAGNOSIS — H18.603 KERATOCONUS OF BOTH EYES: Primary | ICD-10-CM

## 2024-08-28 ASSESSMENT — REFRACTION_CURRENTRX
OS_CYLINDER: -1.50
OS_DIAMETER: 17.0
OD_SPHERE: -2.00
OS_ADDL_SPECS: BOSTON XO BLUE
OD_DIAMETER: 17.0
OS_BRAND: ZENLENS PROLATE
OD_BRAND: ZENLENS PROLATE TRIAL
OS_SPHERE: -17.75
OD_SPHERE: -2.00
OD_DIAMETER: 17.0
OD_BASECURVE: 4.9/7.8
OD_BASECURVE: 5.5/6.9
OS_AXIS: 180

## 2024-08-28 ASSESSMENT — VISUAL ACUITY
OD_SC: 20/100
OS_CC: 20/50-2
CORRECTION_TYPE: CONTACTS
METHOD: SNELLEN - LINEAR

## 2024-08-28 ASSESSMENT — EXTERNAL EXAM - RIGHT EYE: OD_EXAM: NORMAL

## 2024-08-28 ASSESSMENT — TONOMETRY
OD_IOP_MMHG: 09
OS_IOP_MMHG: 04
IOP_METHOD: ICARE

## 2024-08-28 ASSESSMENT — SLIT LAMP EXAM - LIDS
COMMENTS: NORMAL
COMMENTS: NORMAL

## 2024-08-28 ASSESSMENT — EXTERNAL EXAM - LEFT EYE: OS_EXAM: NORMAL

## 2024-08-28 NOTE — PROGRESS NOTES
A/P  1.) Keratoconus OU, s/p PKP right eye  -Previously in scleral lens OU fit in [place]    Right eye:  -s/p PKP 04/2023  -s/p CE/IOL 06/2024  -Now ready for lens per cornea. BCVA 20/30 range today  -Good initial comfort/fit - still needs high sag    Left eye:  -High/degnerative myopia and DONNIE limiting acuity. BCVA approx 20/50-20/60 range.   -No significant OR today. Would rec continuing in current lens  -Some nonwetting, he would like a new left lens    Order new pair and mail direct (yvettee left eye). Follow-up 1-2 months right eye recheck (IOP/pachy/cornea/VA check)    Contact Lens Billing  V-Code:  - GP scleral  Final Contact Lens Rx         Brand Base Curve Diameter Sphere Cylinder Axis Lens Addl. Specs    Right Zenlens Prolate 5.5/7.3 17.0 -1.50   1 flat H x 2 steep V [place] XO2 clear    Left Zenlens Prolate 5.5sag, 6.5bc 17.0 -17.75 -1.50 180 -75um LC, 3 flat H x 1 steep V, 50um dec LC [place] XO blue           # of units: 2 lenses  Price per Unit: $225    This patient requires contact lenses that are medically necessary for either improvement in vision over spectacles, support of the ocular surface, or other therapeutic benefit. These are not cosmetic contact lenses.     Encounter Diagnoses   Name Primary?    Keratoconus of both eyes Yes    Post corneal transplant

## 2024-10-11 ENCOUNTER — APPOINTMENT (OUTPATIENT)
Dept: CT IMAGING | Facility: CLINIC | Age: 54
End: 2024-10-11
Attending: EMERGENCY MEDICINE
Payer: COMMERCIAL

## 2024-10-11 ENCOUNTER — HOSPITAL ENCOUNTER (EMERGENCY)
Facility: CLINIC | Age: 54
Discharge: HOME | End: 2024-10-11
Attending: EMERGENCY MEDICINE | Admitting: EMERGENCY MEDICINE
Payer: COMMERCIAL

## 2024-10-11 VITALS
DIASTOLIC BLOOD PRESSURE: 93 MMHG | WEIGHT: 315 LBS | TEMPERATURE: 98.5 F | HEART RATE: 67 BPM | RESPIRATION RATE: 20 BRPM | OXYGEN SATURATION: 99 % | HEIGHT: 71 IN | BODY MASS INDEX: 44.1 KG/M2 | SYSTOLIC BLOOD PRESSURE: 179 MMHG
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
## 2024-10-11 DIAGNOSIS — S09.90XA CLOSED HEAD INJURY, INITIAL ENCOUNTER: ICD-10-CM

## 2024-10-11 DIAGNOSIS — S16.1XXA NECK STRAIN, INITIAL ENCOUNTER: ICD-10-CM

## 2024-10-11 PROCEDURE — 99284 EMERGENCY DEPT VISIT MOD MDM: CPT | Performed by: EMERGENCY MEDICINE

## 2024-10-11 PROCEDURE — 72125 CT NECK SPINE W/O DYE: CPT

## 2024-10-11 PROCEDURE — 99284 EMERGENCY DEPT VISIT MOD MDM: CPT | Mod: 25

## 2024-10-11 PROCEDURE — 70486 CT MAXILLOFACIAL W/O DYE: CPT

## 2024-10-11 PROCEDURE — 70450 CT HEAD/BRAIN W/O DYE: CPT

## 2024-10-11 PROCEDURE — 250N000013 HC RX MED GY IP 250 OP 250 PS 637: Performed by: STUDENT IN AN ORGANIZED HEALTH CARE EDUCATION/TRAINING PROGRAM

## 2024-10-11 RX ORDER — IBUPROFEN 600 MG/1
600 TABLET, FILM COATED ORAL ONCE
Status: COMPLETED | OUTPATIENT
Start: 2024-10-11 | End: 2024-10-11

## 2024-10-11 RX ORDER — ACETAMINOPHEN 500 MG
1000 TABLET ORAL ONCE
Status: COMPLETED | OUTPATIENT
Start: 2024-10-11 | End: 2024-10-11

## 2024-10-11 RX ORDER — OXYCODONE HYDROCHLORIDE 5 MG/1
5 TABLET ORAL EVERY 4 HOURS PRN
Status: DISCONTINUED | OUTPATIENT
Start: 2024-10-11 | End: 2024-10-11 | Stop reason: HOSPADM

## 2024-10-11 RX ORDER — OXYCODONE HYDROCHLORIDE 5 MG/1
5 TABLET ORAL EVERY 6 HOURS PRN
Qty: 6 TABLET | Refills: 0 | Status: SHIPPED | OUTPATIENT
Start: 2024-10-11

## 2024-10-11 RX ADMIN — ACETAMINOPHEN 1000 MG: 500 TABLET ORAL at 05:53

## 2024-10-11 RX ADMIN — IBUPROFEN 600 MG: 600 TABLET, FILM COATED ORAL at 05:52

## 2024-10-11 ASSESSMENT — COLUMBIA-SUICIDE SEVERITY RATING SCALE - C-SSRS
1. IN THE PAST MONTH, HAVE YOU WISHED YOU WERE DEAD OR WISHED YOU COULD GO TO SLEEP AND NOT WAKE UP?: NO
6. HAVE YOU EVER DONE ANYTHING, STARTED TO DO ANYTHING, OR PREPARED TO DO ANYTHING TO END YOUR LIFE?: NO
2. HAVE YOU ACTUALLY HAD ANY THOUGHTS OF KILLING YOURSELF IN THE PAST MONTH?: NO

## 2024-10-11 ASSESSMENT — ACTIVITIES OF DAILY LIVING (ADL)
ADLS_ACUITY_SCORE: 35

## 2024-10-11 NOTE — Clinical Note
Tino Gleason was seen and treated in our emergency department on 10/11/2024.  He may return to work on 10/13/2024.       If you have any questions or concerns, please don't hesitate to call.      Kevin Esparza MD

## 2024-10-11 NOTE — ED TRIAGE NOTES
Triage Assessment (Adult)       Row Name 10/11/24 0535          Triage Assessment    Airway WDL WDL        Peripheral/Neurovascular WDL    Peripheral Neurovascular WDL WDL        Cognitive/Neuro/Behavioral WDL    Cognitive/Neuro/Behavioral WDL WDL

## 2024-10-11 NOTE — ED TRIAGE NOTES
"Pt arrives to the ER via private car accompanied by daughter.  While on the toilet, pt fell asleep and remembers hitting his head and being on the floor.  Unsure if he lost consciousness.  Denies nausea/vomiting.  Complains of \"excruciating\" neck pain when moving.  C collar secured with 2 Rns; C spine secured.  Pt states that he took 2 muscle relaxers with no relief; unsure what they were.  CMS intact.  Equal strength bilaterally.  Sensation equal.  Denies numbness/tingling.   Triage Assessment (Adult)       Row Name 10/11/24 0535          Triage Assessment    Airway WDL WDL        Peripheral/Neurovascular WDL    Peripheral Neurovascular WDL WDL        Cognitive/Neuro/Behavioral WDL    Cognitive/Neuro/Behavioral WDL WDL                     "

## 2024-10-11 NOTE — DISCHARGE INSTRUCTIONS
Return if symptoms worsen or new symptoms develop.  Follow-up with primary care physician next week for recheck.  Drink plenty of fluids.  Take ibuprofen or Tylenol for pain take Roxicet for significant pain.  No obvious injury to the cervical spine or head are noted.  Stand up slowly and monitor blood pressure blood pressure was elevated but has come down from initial and I feel this is probably due to pain and presentation.  Have your blood pressure rechecked at home tomorrow and if continues to elevate or with severe headaches vomiting focal numbness weakness any extremity bowel or bladder dysfunction or other symptoms please return for recheck.

## 2024-10-11 NOTE — ED PROVIDER NOTES
History     Chief Complaint   Patient presents with    Fall    Neck Pain     Pt fell from toilet to bathtub.  Unsure if lost consciousness.       SHAHRAM Gleason is a 54 year old male with past medical history significant for type 2 diabetes gout hypertension hyperlipidemia corneal transplant right eye who presents emergency department complaining of head and neck pain status post trauma.  Patient fell asleep last night around 11 or 12:00 on the toilet.  Patient fell hitting his head on the right side and then being on the floor for some time he is not sure if he lost consciousness or not is complaining of right lower forehead/orbital pain and is having neck pain.  This is posterior in nature does not radiate patient is not on blood thinners at this time.  Denies any chest pain shortness of breath has not had any abdominal pain or back pain denies any hip pain has not any focal numbness weakness any extremity denies any bowel or bladder dysfunction has not had any leg swelling.  Pain in neck is posterior and worsen with movement c-collar was placed by nursing staff on arrival.    Allergies:  Allergies   Allergen Reactions    Hydrochlorothiazide      Gout flares    Hydrocodone-Acetaminophen Hives and Swelling    Naproxen GI Disturbance     Other reaction(s): GI Upset       Problem List:    Patient Active Problem List    Diagnosis Date Noted    Senile cataract of right eye, unspecified age-related cataract type 06/24/2024     Priority: Medium    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 06/24/2024     Priority: Medium    Nuclear senile cataract of right eye 06/10/2024     Priority: Medium    BMI 45.0-49.9, adult (H) 12/01/2023     Priority: Medium    Essential hypertension 04/27/2023     Priority: Medium    Type 2 diabetes mellitus with hyperglycemia, without long-term current use of insulin (H) 04/27/2023     Priority: Medium    Gout 04/27/2023     Priority: Medium    Postoperative eye  Critical access hospital 03/30/2023     Priority: Medium     Added automatically from request for surgery [id number]      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     Priority: Medium     Added automatically from request for surgery [id number]          Past Medical History:    Past Medical History:   Diagnosis Date    Diabetes mellitus, type 2 (H)     HTN (hypertension)     Hyperlipidemia LDL goal <70     Stable keratoconus of both eyes        Past Surgical History:    Past Surgical History:   Procedure Laterality Date    CATARACT IOL, RT/LT      COLONOSCOPY N/A 03/14/2024    Procedure: COLONOSCOPY, WITH POLYPECTOMY AND BIOPSY;  Surgeon: Zaire Rome MD;  Location: [facility]    KERATOPLASTY DESCEMETS MEMBRANE ENDOTHELIAL (DMEK) Right 03/29/2023    Procedure: ATTEMPTED DESCEMET'S MEMBRANE ENDOTHELIAL KERATOPLASTY (DMEK) RIGHT, VIEW OBSCURED, PROCEDURE ABORTED.;  Surgeon: Mick Paul MD;  Location[facility]    PHACOEMULSIFICATION WITH STANDARD INTRAOCULAR LENS IMPLANT Right 06/27/2024    Procedure: RIGHT EYE PHACOEMULSIFICATION, COMPLEX CATARACT, WITH STANDARD INTRAOCULAR LENS IMPLANT INSERTION;  Surgeon: Jeremy Meza MD;  Location: [facility] OR    GA ANESTH,CORNEAL TRANSPLANT      REPAIR IRIS Right 06/27/2024    Procedure: REPAIR, IRIS;  Surgeon: Jeremy Meza MD;  Location: [facility] OR       Family History:    Family History   Problem Relation Age of Onset    Hypertension Mother     Hyperlipidemia Mother     Hypertension Brother     Hypertension Brother     Heart Failure Maternal Grandmother     Diabetes Maternal Grandfather     Liver Cancer Paternal Grandmother     Glaucoma No family hx of     Macular Degeneration No family hx of        Social History:  Marital Status:   [2]  Social History     Tobacco Use    Smoking status: Never    Smokeless tobacco: Never   Vaping Use    Vaping status: Never Used   Substance Use Topics    Alcohol use: Yes     Alcohol/week: 10.0 standard drinks of alcohol     Types: 10  "Standard drinks or equivalent per week     Comment: occasional    Drug use: Never        Medications:    alcohol swab prep pads  amLODIPine (NORVASC) 10 MG tablet  atorvastatin (LIPITOR) 40 MG tablet  blood glucose (NO BRAND SPECIFIED) test strip  blood glucose monitoring (NO BRAND SPECIFIED) meter device kit  erythromycin (ROMYCIN) 5 MG/GM ophthalmic ointment  fluticasone (FLONASE) 50 MCG/ACT nasal spray  lisinopril (ZESTRIL) 40 MG tablet  metFORMIN (GLUCOPHAGE XR) 500 MG 24 hr tablet  metoprolol succinate ER (TOPROL XL) 50 MG 24 hr tablet  moxifloxacin (VIGAMOX) 0.5 % ophthalmic solution  ofloxacin (OCUFLOX) 0.3 % ophthalmic solution  prednisoLONE acetate (PRED FORTE) 1 % ophthalmic suspension  prednisoLONE acetate (PRED FORTE) 1 % ophthalmic suspension  Semaglutide, 2 MG/DOSE, (OZEMPIC) 8 MG/3ML pen  sodium chloride 0.9 % neb solution  tadalafil (CIALIS) 2.5 MG tablet  thin (NO BRAND SPECIFIED) lancets          Review of Systems  As per HPI  Physical Exam   BP: (!) 205/121  Pulse: 87  Temp: 98.5  F (36.9  C)  Resp: 20  Height: 180.3 cm (5' 11\")  Weight: 142.9 kg (315 lb)  SpO2: 98 %      Physical Exam  Vitals and nursing note reviewed.   Constitutional:       Appearance: Normal appearance. He is not ill-appearing, toxic-appearing or diaphoretic.      Comments: Mild distress secondary to neck pain.   HENT:      Head: Normocephalic.      Comments: Tenderness to palpation along the right lower forehead/lateral orbital region mild swelling no laceration.  Patient has what appears to be cataract transplant right eye mild injection of this eye stating this is normal state     Nose: Nose normal.      Comments: No midface instability present.     Mouth/Throat:      Mouth: Mucous membranes are moist.      Pharynx: Oropharynx is clear.      Comments: Bite is symmetrical.  No jaw tenderness  Eyes:      Extraocular Movements: Extraocular movements intact.   Neck:      Comments: There is tenderness to palpation the posterior " lateral aspects of the neck and midline neck.  No midline shift is noted.  No significant swelling of the soft tissues.  C-collar in place.  Cardiovascular:      Rate and Rhythm: Normal rate and regular rhythm.      Pulses: Normal pulses.      Heart sounds: Normal heart sounds. No murmur heard.  Pulmonary:      Effort: Pulmonary effort is normal.      Breath sounds: Normal breath sounds. No stridor. No wheezing or rhonchi.   Abdominal:      General: Abdomen is flat. Bowel sounds are normal. There is no distension.      Palpations: Abdomen is soft.      Tenderness: There is no right CVA tenderness or left CVA tenderness.   Musculoskeletal:         General: No swelling or tenderness. Normal range of motion.      Right lower leg: No edema.      Left lower leg: No edema.      Comments: No midline back pain present.   Skin:     General: Skin is warm and dry.      Capillary Refill: Capillary refill takes less than 2 seconds.      Findings: No rash.   Neurological:      General: No focal deficit present.      Mental Status: He is oriented to person, place, and time.      Cranial Nerves: No cranial nerve deficit.      Motor: No weakness.      Coordination: Coordination normal.   Psychiatric:         Mood and Affect: Mood normal.         ED Course        Procedures              Critical Care time:  none              Results for orders placed or performed during the hospital encounter of 10/11/24 (from the past 24 hour(s))   CT Head w/o Contrast    Narrative    CT SCAN OF THE HEAD WITHOUT CONTRAST  October 11, 2024 6:52 AM   CT SCAN OF THE FACE WITHOUT CONTRAST     HISTORY: Fall with closed head injury and right forehead pain.    TECHNIQUE: Axial images of the head and facial bones were completed  with sagittal and coronal reformations. Radiation dose for this scan  was reduced using automated exposure control, adjustment of the mA  and/or kV according to patient size, or iterative reconstruction  technique.    COMPARISON:  None.    FINDINGS:   Head CT: There is no evidence of intracranial hemorrhage, mass, acute  infarct or anomaly. The ventricles are normal in size, shape and  configuration. The brain parenchyma and subarachnoid spaces are  normal.     No calvarial fracture appreciated.    Facial CT: No significant soft tissue swelling. No facial bone  fracture identified. The temporomandibular joints appear properly  located.     Mild mucosal thickening in the inferior right maxillary sinus. Small  left mastoid effusion. Large carious lesion of a right maxillary  molar.    No mass identified within the visualized soft tissues of the neck.       Impression    IMPRESSION:     Head CT: No evidence of acute intracranial hemorrhage, mass, or  herniation.    Facial bone CT:   1. No facial bone fracture.  2. Large carious lesion of a right maxillary molar.       FAREED PIERRE MD         SYSTEM ID:  [id number]   CT Facial Bones without Contrast    Narrative    CT SCAN OF THE HEAD WITHOUT CONTRAST  October 11, 2024 6:52 AM   CT SCAN OF THE FACE WITHOUT CONTRAST     HISTORY: Fall with closed head injury and right forehead pain.    TECHNIQUE: Axial images of the head and facial bones were completed  with sagittal and coronal reformations. Radiation dose for this scan  was reduced using automated exposure control, adjustment of the mA  and/or kV according to patient size, or iterative reconstruction  technique.    COMPARISON: None.    FINDINGS:   Head CT: There is no evidence of intracranial hemorrhage, mass, acute  infarct or anomaly. The ventricles are normal in size, shape and  configuration. The brain parenchyma and subarachnoid spaces are  normal.     No calvarial fracture appreciated.    Facial CT: No significant soft tissue swelling. No facial bone  fracture identified. The temporomandibular joints appear properly  located.     Mild mucosal thickening in the inferior right maxillary sinus. Small  left mastoid effusion. Large carious  lesion of a right maxillary  molar.    No mass identified within the visualized soft tissues of the neck.       Impression    IMPRESSION:     Head CT: No evidence of acute intracranial hemorrhage, mass, or  herniation.    Facial bone CT:   1. No facial bone fracture.  2. Large carious lesion of a right maxillary molar.       FAREED PIERRE MD         SYSTEM ID:  [id number]   CT Cervical Spine w/o Contrast    Narrative    CT CERVICAL SPINE WITHOUT CONTRAST October 11, 2024 6:57 AM     HISTORY: Fall with neck pain.     TECHNIQUE: Axial images of the cervical spine were obtained without  intravenous contrast. Multiplanar reformations were performed.  Radiation dose for this scan was reduced using automated exposure  control, adjustment of the mA and/or kV according to patient size, or  iterative reconstruction technique.    COMPARISON: None.    FINDINGS: Straightening of the normal cervical lordosis which may be  positional. No loss of vertebral body height. No acute lucent fracture  lines. No focal destructive bony lesions. Mild degenerative endplate  changes and loss of disc height throughout the cervical spine.  Prominent anterior osteophytes in the cervical spine from C4 down to  C7 compatible with diffuse idiopathic skeletal hyperostosis (DISH).  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  Congenital spinal canal narrowing. Mild to moderate spinal canal  narrowings from C3-C4 through C6-C7. No significant neural foraminal  narrowings.    Visualized paraspinous tissues: Atherosclerotic calcifications at the  carotid bifurcations.      Impression    IMPRESSION:   1. No evidence of acute fracture or subluxation in the cervical spine.  2. Diffuse idiopathic skeletal hyperostosis of the cervical spine.  3. 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  4. Degenerative findings in the cervical spine superimposed upon  congenital spinal canal narrowing. Mild to moderate spinal  canal  narrowings from C3-C4 through C6-C7.         FAREED PIERRE MD         SYSTEM ID:  [id number]       Medications   acetaminophen (TYLENOL) tablet 1,000 mg (1,000 mg Oral $Given 10/11/24 2239)   ibuprofen (ADVIL/MOTRIN) tablet 600 mg (600 mg Oral $Given 10/11/24 6485)       Assessments & Plan (with Medical Decision Making) records reviewed including past medical history medications and allergies.  Office visit from 8/22/2024 was reviewed.  General health care visit for his hypertension and diabetes.  CT scan of the head face and cervical spine were obtained.  Patient had not had any chest pain or other abnormality prior to going down and did not feel labs or EKG were necessary at this time.  I independently reviewed the imaging studies and agree with the radiologist findings.  CT scan of the head no evidence of any acute intracranial hemorrhage mass or herniation.  Facial bone CT with no facial bone fracture there is a carious lesion of the right maxillary molar.  CT scan of the cervical spine with significant degenerative changes but no acute fracture or subluxation present c-collar was removed and patient was able flex and extend his neck without significant difficulty.  I discussed labs with patient but he did not feel they were necessary.  Feels comfortable going home at this time.  He will drink plenty of fluids monitor sugars closely and return if symptoms worsen or new symptoms develop including severe headache neck pain numbness weakness any extremity or other symptoms present.     I have reviewed the nursing notes.    I have reviewed the findings, diagnosis, plan and need for follow up with the patient.           Discharge Medication List as of 10/11/2024  9:17 AM        START taking these medications    Details   oxyCODONE (ROXICODONE) 5 MG tablet Take 1 tablet (5 mg) by mouth every 6 hours as needed for severe pain., Disp-6 tablet, R-0, Local Print             Final diagnoses:   Fall, initial encounter    Closed head injury, initial encounter   Neck strain, initial encounter       10/11/2024   [facility] EMERGENCY DEPT       Kevin Esparza MD  10/11/24 1949

## 2024-10-11 NOTE — ED NOTES
Update to MD regarding imaging. CCollar removed as ordered by MD. Neuro status unchanged post removal.

## 2024-10-13 ENCOUNTER — HEALTH MAINTENANCE LETTER (OUTPATIENT)
Age: 54
End: 2024-10-13

## 2024-10-14 ENCOUNTER — HOSPITAL ENCOUNTER (EMERGENCY)
Facility: CLINIC | Age: 54
Discharge: HOME | End: 2024-10-14
Attending: PHYSICIAN ASSISTANT | Admitting: PHYSICIAN ASSISTANT
Payer: COMMERCIAL

## 2024-10-14 VITALS
HEART RATE: 70 BPM | SYSTOLIC BLOOD PRESSURE: 158 MMHG | OXYGEN SATURATION: 99 % | DIASTOLIC BLOOD PRESSURE: 85 MMHG | RESPIRATION RATE: 16 BRPM | TEMPERATURE: 98.2 F

## 2024-10-14 DIAGNOSIS — H92.02 LEFT EAR PAIN: ICD-10-CM

## 2024-10-14 DIAGNOSIS — M54.2 CERVICALGIA: ICD-10-CM

## 2024-10-14 PROCEDURE — G0463 HOSPITAL OUTPT CLINIC VISIT: HCPCS | Performed by: PHYSICIAN ASSISTANT

## 2024-10-14 PROCEDURE — 99213 OFFICE O/P EST LOW 20 MIN: CPT | Performed by: PHYSICIAN ASSISTANT

## 2024-10-14 RX ORDER — CYCLOBENZAPRINE HCL 10 MG
10 TABLET ORAL 3 TIMES DAILY PRN
Qty: 20 TABLET | Refills: 0 | Status: SHIPPED | OUTPATIENT
Start: 2024-10-14 | End: 2024-10-20

## 2024-10-14 NOTE — ED TRIAGE NOTES
Pt presents with lower back pain, fell Friday was seen in ER and had CT scan done.  Here today pain is still bad.  Left ear pain as well today.

## 2024-10-14 NOTE — ED PROVIDER NOTES
History   No chief complaint on file.    SHAHRAM Gleason is a 54 year old male who presents to urgent care for concern over persistent neck send upper back pain after he sustained a fall 10/11/2024.  He was evaluated in the emergency department at onset of symptoms had CT of neck which was negative for acute fracture or subluxation however did have diffuse idiopathic skeletal hyperostosis of the cervical spine, ossification of the posterior longitudinal ligament at C5-C6 and degenerative changes superimposed upon congenital spinal canal narrowing.  Mild to moderate spinal canal narrowing from C3-C4 through C6-C7.  He complains of persistent sharp pains at the lower part of his neck which is exacerbated by movements, changes in position.  Alleviated by rest.  He also notes pain in his left ear, decreased hearing, popping and crackling noises.  He denies any current headache, dizziness, lightheadedness, nausea, vomiting, photo phonophobia, nasal congestion, sore throat, cough, dyspnea, wheezing, numbness or paresthesias in his upper extremities, chest pain or palpitations.  He was discharged from the department with prescription for oxycodone which he finished as directed without significant improvement.  He has been taking ibuprofen up to 1000 mg at a time last dose this morning.        Allergies:  Allergies   Allergen Reactions    Hydrochlorothiazide      Gout flares    Hydrocodone-Acetaminophen Hives and Swelling    Naproxen GI Disturbance     Other reaction(s): GI Upset       Problem List:    Patient Active Problem List    Diagnosis Date Noted    Senile cataract of right eye, unspecified age-related cataract type 06/24/2024     Priority: Medium    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 06/24/2024     Priority: Medium    Nuclear senile cataract of right eye 06/10/2024     Priority: Medium    BMI 45.0-49.9, adult (H) 12/01/2023     Priority: Medium    Essential hypertension 04/27/2023      Priority: Medium    Type 2 diabetes mellitus with hyperglycemia, without long-term current use of insulin (H) 04/27/2023     Priority: Medium    Gout 04/27/2023     Priority: Medium    Postoperative eye state 03/30/2023     Priority: Medium     Added automatically from request for surgery [id number]      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     Priority: Medium     Added automatically from request for surgery [id number]          Past Medical History:    Past Medical History:   Diagnosis Date    Diabetes mellitus, type 2 (H)     HTN (hypertension)     Hyperlipidemia LDL goal <70     Stable keratoconus of both eyes        Past Surgical History:    Past Surgical History:   Procedure Laterality Date    CATARACT IOL, RT/LT      COLONOSCOPY N/A 03/14/2024    Procedure: COLONOSCOPY, WITH POLYPECTOMY AND BIOPSY;  Surgeon: Zaire Rome MD;  Location: [facility]    KERATOPLASTY DESCEMETS MEMBRANE ENDOTHELIAL (DMEK) Right 03/29/2023    Procedure: ATTEMPTED DESCEMET'S MEMBRANE ENDOTHELIAL KERATOPLASTY (DMEK) RIGHT, VIEW OBSCURED, PROCEDURE ABORTED.;  Surgeon: Mick Pual MD;  [facility]    PHACOEMULSIFICATION WITH STANDARD INTRAOCULAR LENS IMPLANT Right 06/27/2024    Procedure: RIGHT EYE PHACOEMULSIFICATION, COMPLEX CATARACT, WITH STANDARD INTRAOCULAR LENS IMPLANT INSERTION;  Surgeon: Jeremy Meza MD;  Location: [facility] OR    DC ANESTH,CORNEAL TRANSPLANT      REPAIR IRIS Right 06/27/2024    Procedure: REPAIR, IRIS;  Surgeon: Jeremy Meza MD;  Location: [facility] OR       Family History:    Family History   Problem Relation Age of Onset    Hypertension Mother     Hyperlipidemia Mother     Hypertension Brother     Hypertension Brother     Heart Failure Maternal Grandmother     Diabetes Maternal Grandfather     Liver Cancer Paternal Grandmother     Glaucoma No family hx of     Macular Degeneration No family hx of        Social History:  Marital Status:   [2]  Social History     Tobacco Use     Smoking status: Never    Smokeless tobacco: Never   Vaping Use    Vaping status: Never Used   Substance Use Topics    Alcohol use: Yes     Alcohol/week: 10.0 standard drinks of alcohol     Types: 10 Standard drinks or equivalent per week     Comment: occasional    Drug use: Never        Medications:    cyclobenzaprine (FLEXERIL) 10 MG tablet  alcohol swab prep pads  amLODIPine (NORVASC) 10 MG tablet  atorvastatin (LIPITOR) 40 MG tablet  blood glucose (NO BRAND SPECIFIED) test strip  blood glucose monitoring (NO BRAND SPECIFIED) meter device kit  erythromycin (ROMYCIN) 5 MG/GM ophthalmic ointment  fluticasone (FLONASE) 50 MCG/ACT nasal spray  lisinopril (ZESTRIL) 40 MG tablet  metFORMIN (GLUCOPHAGE XR) 500 MG 24 hr tablet  metoprolol succinate ER (TOPROL XL) 50 MG 24 hr tablet  moxifloxacin (VIGAMOX) 0.5 % ophthalmic solution  ofloxacin (OCUFLOX) 0.3 % ophthalmic solution  oxyCODONE (ROXICODONE) 5 MG tablet  prednisoLONE acetate (PRED FORTE) 1 % ophthalmic suspension  prednisoLONE acetate (PRED FORTE) 1 % ophthalmic suspension  Semaglutide, 2 MG/DOSE, (OZEMPIC) 8 MG/3ML pen  sodium chloride 0.9 % neb solution  tadalafil (CIALIS) 2.5 MG tablet  thin (NO BRAND SPECIFIED) lancets      Review of Systems  Per HPI  Physical Exam   BP: (!) 158/85  Pulse: 70  Temp: 98.2  F (36.8  C)  Resp: 16  SpO2: 99 %  Physical Exam  Constitutional:       General: He is not in acute distress.     Appearance: He is not ill-appearing or toxic-appearing.   HENT:      Head: Normocephalic and atraumatic.      Right Ear: Tympanic membrane, ear canal and external ear normal.      Left Ear: Tympanic membrane, ear canal and external ear normal.      Mouth/Throat:      Mouth: Mucous membranes are moist.      Pharynx: Oropharynx is clear.   Eyes:      Extraocular Movements: Extraocular movements intact.      Pupils: Pupils are equal, round, and reactive to light.   Cardiovascular:      Rate and Rhythm: Normal rate and regular rhythm.      Heart  sounds: No murmur heard.     No friction rub. No gallop.   Pulmonary:      Effort: Pulmonary effort is normal.      Breath sounds: Normal breath sounds. No wheezing, rhonchi or rales.   Musculoskeletal:      Cervical back: Tenderness present. No swelling or deformity. Decreased range of motion.      Thoracic back: Tenderness present. No swelling, deformity, lacerations or bony tenderness.   Skin:     General: Skin is warm and dry.      Findings: No abrasion, ecchymosis, erythema, laceration or rash.   Neurological:      Mental Status: He is alert and oriented to person, place, and time.      Sensory: No sensory deficit.       ED Course        Procedures         Critical Care time:  none         No results found for this or any previous visit (from the past 24 hour(s)).    Medications - No data to display    Assessments & Plan (with Medical Decision Making)     I have reviewed the nursing notes.    I have reviewed the findings, diagnosis, plan and need for follow up with the patient.       New Prescriptions    CYCLOBENZAPRINE (FLEXERIL) 10 MG TABLET    Take 1 tablet (10 mg) by mouth 3 times daily as needed for muscle spasms.       Final diagnoses:   Cervicalgia   Left ear pain     54-year-old male presents to urgent care with concern over persistent neck and upper back pain after he sustained a fall 10/11/2024.  He had elevated blood pressure upon arrival, remainder of vital signs were stable.  Physical exam findings significant for soft tissue tenderness palpation in the posterior aspect of the neck, decreased range of motion actively due to discomfort, distal neurovascular status is intact.  No evidence of otitis externa, otitis media.  I have low suspicion for TMJ do not suspect mastoiditis.  Would favor eustachian tube dysfunction as cause of ear symptoms.  Did review prior ED visit which had CT of cervical spine which is negative for evidence of acute fracture.  Symptoms consistent with soft tissue injury  differential favors sprain/strain.  He was discharged home stable with instructions for continued use of OTC symptomatic treatment with Tylenol/ibuprofen, topical Voltaren/lidocaine, appropriate dosing guidelines discussed.  Did discuss for/benefits of trial of Flexeril to be used as needed for muscle spasm and patient elected to proceed.  He was discharged home stable with instructions to follow-up with primary care provider if no improvement within the next 5 to 7 days.  Worrisome reasons to return to the ER/UC sooner discussed.    Disclaimer: This note consists of symbols derived from keyboarding, dictation, and/or voice recognition software. As a result, there may be errors in the script that have gone undetected.  Please consider this when interpreting information found in the chart.      10/14/2024   [facility] EMERGENCY DEPT       Mindy Gamble PA-C  10/20/24 2122

## 2024-10-14 NOTE — Clinical Note
Tino Gleason was seen and treated in our emergency department on 10/14/2024.    He was also seen in the emergency department 10/11/2024.  I recommend he rest the neck and upper back with limited activity only as tolerated by symptoms for the next 7 days or until his next follow-up appointment.  During this time he should avoid any twisting turning repetitive movements of the neck, lifting greater than 10 pounds or over the shoulder lifting.       Sincerely,     [facility] Emergency Dept

## 2024-10-18 ENCOUNTER — TRANSFERRED RECORDS (OUTPATIENT)
Dept: MULTI SPECIALTY CLINIC | Facility: CLINIC | Age: 54
End: 2024-10-18

## 2024-10-18 LAB — RETINOPATHY: NORMAL

## 2024-10-21 DIAGNOSIS — E11.65 TYPE 2 DIABETES MELLITUS WITH HYPERGLYCEMIA, WITHOUT LONG-TERM CURRENT USE OF INSULIN (H): ICD-10-CM

## 2024-10-22 RX ORDER — SEMAGLUTIDE 2.68 MG/ML
INJECTION, SOLUTION SUBCUTANEOUS
Qty: 9 ML | Refills: 0 | Status: SHIPPED | OUTPATIENT
Start: 2024-10-22

## 2024-10-30 ENCOUNTER — OFFICE VISIT (OUTPATIENT)
Dept: OPTOMETRY | Facility: CLINIC | Age: 54
End: 2024-10-30
Payer: COMMERCIAL

## 2024-10-30 DIAGNOSIS — H18.603 KERATOCONUS OF BOTH EYES: Primary | ICD-10-CM

## 2024-10-30 DIAGNOSIS — Z94.7 POST CORNEAL TRANSPLANT: ICD-10-CM

## 2024-10-30 ASSESSMENT — REFRACTION_CURRENTRX
OD_ADDL_SPECS: BOSTON XO2 CLEAR
OS_AXIS: 180
OS_CYLINDER: -1.50
OS_SPHERE: -17.75
OS_DIAMETER: 17.0
OS_ADDL_SPECS: BOSTON XO BLUE
OD_BRAND: ZENLENS PROLATE
OS_BRAND: ZENLENS PROLATE
OD_SPHERE: -1.50
OD_DIAMETER: 17.0

## 2024-10-30 ASSESSMENT — EXTERNAL EXAM - LEFT EYE: OS_EXAM: NORMAL

## 2024-10-30 ASSESSMENT — VISUAL ACUITY
OD_CC: 20/40
METHOD: SNELLEN - LINEAR
OS_CC: 20/50
OD_CC+: -1
CORRECTION_TYPE: CONTACTS

## 2024-10-30 ASSESSMENT — PACHYMETRY: OD_CT(UM): 707

## 2024-10-30 ASSESSMENT — TONOMETRY
IOP_METHOD: ICARE
OS_IOP_MMHG: 07
OD_IOP_MMHG: 12

## 2024-10-30 ASSESSMENT — EXTERNAL EXAM - RIGHT EYE: OD_EXAM: NORMAL

## 2024-10-30 ASSESSMENT — SLIT LAMP EXAM - LIDS
COMMENTS: NORMAL
COMMENTS: NORMAL

## 2024-10-30 NOTE — PROGRESS NOTES
A/P  1.) Keratoconus OU, s/p PKP right eye  -Previously in scleral lens OU fit in [place]    Right eye:  -s/p PKP 04/2023. On Pred bid OD  -s/p CE/IOL 06/2024  -Overall doing well in return to scleral lens. BCVA 20/25- with sphero-cyl  -Thick cornea today (707) but without rasheed edema. Monitor. Already in high Dk material  -Tilted graft, need more superior clearance on lens. Will flatten bc further to help accommodate    Left eye:  -High/degnerative myopia and DONNIE limiting acuity. BCVA approx 20/50-20/60 range.   -Stable, continue in current lens    Order new right and mail direct. Seeing Dr. Meza in 3 months, rec follow-up with me in 6 months      I have confirmed the patient's CC, HPI and reviewed Past Medical History, Past Surgical History, Social History, Family History, Problem List, Medication List and agree with Tech note.     Ana Gr, OD FAAO SRIRAMS

## 2024-10-30 NOTE — NURSING NOTE
Chief Complaints and History of Present Illnesses   Patient presents with    Contact Lens Follow Up     Pt here for scleral lens follow up.      Chief Complaint(s) and History of Present Illness(es)       Contact Lens Follow Up              Laterality: both eyes    Comments: Pt here for scleral lens follow up.               Comments    Pt notes new lenses clouds up after 12 hours- which is improved. No new concerns.     Joceline Lerma, COT on 10/30/2024 at 7:38 AM

## 2024-11-03 ENCOUNTER — TELEPHONE (OUTPATIENT)
Dept: FAMILY MEDICINE | Facility: CLINIC | Age: 54
End: 2024-11-03
Payer: COMMERCIAL

## 2024-11-03 ENCOUNTER — MYC MEDICAL ADVICE (OUTPATIENT)
Dept: ADMISSION | Facility: CLINIC | Age: 54
End: 2024-11-03
Payer: COMMERCIAL

## 2024-11-04 NOTE — TELEPHONE ENCOUNTER
[facility] form received, Sent patient a My Chart note asking for more information regarding needs of form. Only information listed on the form is date off work of 10/11/24    Form is in Awaiting Response Tray on forms desk.

## 2024-11-05 NOTE — TELEPHONE ENCOUNTER
Form completed, signed, and faxed to [phone]. Copy sent to scan and copy placed in cabinet. Patient notified of status via My Chart.

## 2024-11-05 NOTE — TELEPHONE ENCOUNTER
Form completed, signed, and faxed to [phone]. Copy sent to scan and copy placed in cabinet. Patient notified of status via My Chart.

## 2024-12-19 ENCOUNTER — OFFICE VISIT (OUTPATIENT)
Dept: FAMILY MEDICINE | Facility: CLINIC | Age: 54
End: 2024-12-19
Payer: COMMERCIAL

## 2024-12-19 VITALS
HEIGHT: 72 IN | SYSTOLIC BLOOD PRESSURE: 138 MMHG | WEIGHT: 315 LBS | RESPIRATION RATE: 20 BRPM | OXYGEN SATURATION: 99 % | TEMPERATURE: 98.2 F | HEART RATE: 71 BPM | DIASTOLIC BLOOD PRESSURE: 78 MMHG | BODY MASS INDEX: 42.66 KG/M2

## 2024-12-19 DIAGNOSIS — M54.32 SCIATICA OF LEFT SIDE: ICD-10-CM

## 2024-12-19 DIAGNOSIS — Z00.00 ROUTINE GENERAL MEDICAL EXAMINATION AT A HEALTH CARE FACILITY: Primary | ICD-10-CM

## 2024-12-19 DIAGNOSIS — Z13.9 ENCOUNTER FOR SCREENING INVOLVING SOCIAL DETERMINANTS OF HEALTH (SDOH): ICD-10-CM

## 2024-12-19 DIAGNOSIS — I10 ESSENTIAL HYPERTENSION: ICD-10-CM

## 2024-12-19 DIAGNOSIS — L30.9 ECZEMA, UNSPECIFIED TYPE: ICD-10-CM

## 2024-12-19 DIAGNOSIS — E78.5 HYPERLIPIDEMIA LDL GOAL <70: ICD-10-CM

## 2024-12-19 DIAGNOSIS — E11.65 TYPE 2 DIABETES MELLITUS WITH HYPERGLYCEMIA, WITHOUT LONG-TERM CURRENT USE OF INSULIN (H): ICD-10-CM

## 2024-12-19 LAB
ANION GAP SERPL CALCULATED.3IONS-SCNC: 9 MMOL/L (ref 7–15)
BUN SERPL-MCNC: 12.7 MG/DL (ref 6–20)
CALCIUM SERPL-MCNC: 9.7 MG/DL (ref 8.8–10.4)
CHLORIDE SERPL-SCNC: 106 MMOL/L (ref 98–107)
CREAT SERPL-MCNC: 1.12 MG/DL (ref 0.67–1.17)
EGFRCR SERPLBLD CKD-EPI 2021: 79 ML/MIN/1.73M2
EST. AVERAGE GLUCOSE BLD GHB EST-MCNC: 151 MG/DL
GLUCOSE SERPL-MCNC: 124 MG/DL (ref 70–99)
HBA1C MFR BLD: 6.9 % (ref 0–5.6)
HCO3 SERPL-SCNC: 27 MMOL/L (ref 22–29)
POTASSIUM SERPL-SCNC: 4.3 MMOL/L (ref 3.4–5.3)
SODIUM SERPL-SCNC: 142 MMOL/L (ref 135–145)

## 2024-12-19 RX ORDER — SEMAGLUTIDE 2.68 MG/ML
2 INJECTION, SOLUTION SUBCUTANEOUS
Qty: 9 ML | Refills: 1 | Status: SHIPPED | OUTPATIENT
Start: 2024-12-19

## 2024-12-19 RX ORDER — ATORVASTATIN CALCIUM 40 MG/1
40 TABLET, FILM COATED ORAL DAILY
Qty: 90 TABLET | Refills: 3 | Status: SHIPPED | OUTPATIENT
Start: 2024-12-19

## 2024-12-19 RX ORDER — METFORMIN HYDROCHLORIDE 500 MG/1
500 TABLET, EXTENDED RELEASE ORAL 2 TIMES DAILY WITH MEALS
Qty: 180 TABLET | Refills: 1 | Status: SHIPPED | OUTPATIENT
Start: 2024-12-19

## 2024-12-19 RX ORDER — TRIAMCINOLONE ACETONIDE 1 MG/G
CREAM TOPICAL 2 TIMES DAILY
Qty: 30 G | Refills: 3 | Status: SHIPPED | OUTPATIENT
Start: 2024-12-19

## 2024-12-19 RX ORDER — LISINOPRIL 40 MG/1
40 TABLET ORAL DAILY
Qty: 90 TABLET | Refills: 3 | Status: SHIPPED | OUTPATIENT
Start: 2024-12-19

## 2024-12-19 SDOH — HEALTH STABILITY: PHYSICAL HEALTH: ON AVERAGE, HOW MANY MINUTES DO YOU ENGAGE IN EXERCISE AT THIS LEVEL?: 0 MIN

## 2024-12-19 SDOH — HEALTH STABILITY: PHYSICAL HEALTH: ON AVERAGE, HOW MANY DAYS PER WEEK DO YOU ENGAGE IN MODERATE TO STRENUOUS EXERCISE (LIKE A BRISK WALK)?: 0 DAYS

## 2024-12-19 ASSESSMENT — PAIN SCALES - GENERAL: PAINLEVEL_OUTOF10: NO PAIN (0)

## 2024-12-19 ASSESSMENT — SOCIAL DETERMINANTS OF HEALTH (SDOH): HOW OFTEN DO YOU GET TOGETHER WITH FRIENDS OR RELATIVES?: PATIENT DECLINED

## 2024-12-19 NOTE — PROGRESS NOTES
Preventive Care Visit  [facility]  Thea Olvera NP, Family Medicine  Dec 19, 2024    Assessment & Plan     Routine general medical examination at a health care facility  Patient is up to date on screenings.  He has not checked with insurance on Shingles vaccinations so this was not given today.  Clinical history addressed below.  Reviewed preventative health recommendations and advised follow-up in clinic in 1 year for annual physical exam.    Encounter for screening involving social determinants of health (SDoH)  - Primary Care - Care Coordination Referral; Future    Type 2 diabetes mellitus with hyperglycemia, without long-term current use of insulin (H)  Metformin and Ozempic refilled for 6 months.  Ordered A1C for monitoring.  - Hemoglobin A1c; Future  - metFORMIN (GLUCOPHAGE XR) 500 MG 24 hr tablet; Take 1 tablet (500 mg) by mouth 2 times daily (with meals).  - Semaglutide, 2 MG/DOSE, (OZEMPIC, 2 MG/DOSE,) 8 MG/3ML pen; Inject 2 mg subcutaneously every 7 days.  - Hemoglobin A1c    Essential hypertension  Stable.  BMP ordered for monitoring.  Lisinopril filled for 1 year.  - BASIC METABOLIC PANEL; Future  - lisinopril (ZESTRIL) 40 MG tablet; Take 1 tablet (40 mg) by mouth daily.  - BASIC METABOLIC PANEL    Hyperlipidemia LDL goal <70  Stable lab in August.  Refilled Lipitor for 1 year.  - atorvastatin (LIPITOR) 40 MG tablet; Take 1 tablet (40 mg) by mouth daily.    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  He is having some small weight loss slowly on the ozempic.    Eczema, unspecified type  Treating with kenalog cream twice daily and moisturizer to eczema spots on the legs and left forearm.  Follow-up as needed.  - triamcinolone (KENALOG) 0.1 % external cream; Apply topically 2 times daily. For up to 2 weeks then stop for a week and reapply as needed    Sciatica of left  side  Referral to physical therapy to work on sciatica pain.  - Physical Therapy  Referral; Future    Patient has been advised of split billing requirements and indicates understanding: Yes    See Patient Instructions    Amilcar Jiménez is a 54 year old, presenting for the following:  Physical        12/19/2024     8:06 AM   Additional Questions   Roomed by RMB   Accompanied by self         12/19/2024     8:06 AM   Patient Reported Additional Medications   Patient reports taking the following new medications no        Via the Health Maintenance questionnaire, the patient has reported the following services have been completed -Eye Exam: [facility] 2024-10-24, this information has not been sent to the abstraction team.    HPI    Diabetes Follow-up    How often are you checking your blood sugar? A few times a month  What time of day are you checking your blood sugars (select all that apply)?  Before meals  Have you had any blood sugars above 200?  No  Have you had any blood sugars below 70?  No  What symptoms do you notice when your blood sugar is low?  None  What concerns do you have today about your diabetes? None   Do you have any of these symptoms? (Select all that apply)  No numbness or tingling in feet.  No redness, sores or blisters on feet.  No complaints of excessive thirst.  No reports of blurry vision.  No significant changes to weight.      Hyperlipidemia Follow-Up    Are you regularly taking any medication or supplement to lower your cholesterol?   Yes-    Are you having muscle aches or other side effects that you think could be caused by your cholesterol lowering medication?  No    Hypertension Follow-up    Do you check your blood pressure regularly outside of the clinic? Yes   Are you following a low salt diet? No  Are your blood pressures ever more than 140 on the top number (systolic) OR more   than 90 on the bottom number (diastolic), for example 140/90?  No    BP Readings from Last 2 Encounters:   12/19/24 138/78   10/14/24 (!) 158/85     Hemoglobin A1C (%)   Date Value   06/24/2024 7.2 (H)   12/01/2023 7.4 (H)     LDL Cholesterol Calculated (mg/dL)   Date Value   08/22/2024 41   07/31/2023 50     Health Care Directive  Patient does not have a Health Care Directive: Discussed advance care planning with patient; information given to patient to review.      12/19/2024   General Health   How would you rate your overall physical health? (!) FAIR   Feel stress (tense, anxious, or unable to sleep) Only a little   (!) STRESS CONCERN      12/19/2024   Nutrition   Three or more servings of calcium each day? Yes   Diet: Regular (no restrictions)   How many servings of fruit and vegetables per day? (!) 0-1   How many sweetened beverages each day? 0-1         12/19/2024   Exercise   Days per week of moderate/strenous exercise 0 days   Average minutes spent exercising at this level 0 min   (!) EXERCISE CONCERN        12/19/2024   Social Factors   Frequency of gathering with friends or relatives Patient declined   Worry food won't last until get money to buy more No   Food not last or not have enough money for food? Yes, going to food [employer] to help   Do you have housing? (Housing is defined as stable permanent housing and does not include staying ouside in a car, in a tent, in an abandoned building, in an overnight shelter, or couch-surfing.) No   Are you worried about losing your housing? Yes, being behind this month   Lack of transportation? No   Unable to get utilities (heat,electricity)? No   Want help with housing or utility concern? (!) YES   (!) FOOD SECURITY CONCERN PRESENT(!) HOUSING CONCERN PRESENT      12/19/2024   Fall Risk   Fallen 2 or more times in the past year? No   Trouble with walking or balance? No          12/19/2024   Dental   Dentist two times every year? (!) NO         8/22/2024   TB Screening   Were you born outside of the [place]? No       Today's PHQ-2  Score:       7/8/2024     9:47 AM   PHQ-2 ( 1999 [employer])   Q1: Little interest or pleasure in doing things 0   Q2: Feeling down, depressed or hopeless 0   PHQ-2 Score 0         12/19/2024   Substance Use   Alcohol more than 3/day or more than 7/wk No   Do you use any other substances recreationally? No     Social History     Tobacco Use    Smoking status: Never    Smokeless tobacco: Never   Vaping Use    Vaping status: Never Used   Substance Use Topics    Alcohol use: Yes     Alcohol/week: 10.0 standard drinks of alcohol     Types: 10 Standard drinks or equivalent per week     Comment: occasional    Drug use: Never         12/19/2024   One time HIV Screening   Previous HIV test? Yes         12/19/2024   STI Screening   New sexual partner(s) since last STI/HIV test? No   ASCVD Risk   The ASCVD Risk score (Benedicto LEE, et al., 2019) failed to calculate for the following reasons:    The valid total cholesterol range is 130 to 320 mg/dL    Reviewed and updated as needed this visit by Provider   Tobacco  Allergies  Meds  Problems  Med Hx  Surg Hx  Fam Hx  Soc   Hx Sexual Activity          Past Medical History:   Diagnosis Date    Diabetes mellitus, type 2 (H)     HTN (hypertension)     Hyperlipidemia LDL goal <70     Stable keratoconus of both eyes      Past Surgical History:   Procedure Laterality Date    CATARACT IOL, RT/LT      COLONOSCOPY N/A 03/14/2024    Procedure: COLONOSCOPY, WITH POLYPECTOMY AND BIOPSY;  Surgeon: Zaire Rome MD;  Location: [facility]    KERATOPLASTY DESCEMETS MEMBRANE ENDOTHELIAL (DMEK) Right 03/29/2023    Procedure: ATTEMPTED DESCEMET'S MEMBRANE ENDOTHELIAL KERATOPLASTY (DMEK) RIGHT, VIEW OBSCURED, PROCEDURE ABORTED.;  Surgeon: Mick Paul MD;  Location: [facility]    PHACOEMULSIFICATION WITH STANDARD INTRAOCULAR LENS IMPLANT Right 06/27/2024    Procedure: RIGHT EYE PHACOEMULSIFICATION, COMPLEX CATARACT, WITH STANDARD INTRAOCULAR LENS IMPLANT INSERTION;  Surgeon: Susana  MD Jeremy;  Location: [facility] OR    IA ANESTH,CORNEAL TRANSPLANT      REPAIR IRIS Right 06/27/2024    Procedure: REPAIR, IRIS;  Surgeon: Jeremy Meza MD;  Location: [facility] OR     Lab work is in process  Labs reviewed in EPIC  BP Readings from Last 3 Encounters:   12/19/24 138/78   10/14/24 (!) 158/85   10/11/24 (!) 179/93    Wt Readings from Last 3 Encounters:   12/19/24 144.2 kg (318 lb)   10/11/24 142.9 kg (315 lb)   08/22/24 143.7 kg (316 lb 12.8 oz)                  Patient Active Problem List   Diagnosis    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    Postoperative eye state    Essential hypertension    Type 2 diabetes mellitus with hyperglycemia, without long-term current use of insulin (H)    Gout    BMI 45.0-49.9, adult (H)    Senile cataract of right eye, unspecified age-related cataract type    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    Nuclear senile cataract of right eye     Past Surgical History:   Procedure Laterality Date    CATARACT IOL, RT/LT      COLONOSCOPY N/A 03/14/2024    Procedure: COLONOSCOPY, WITH POLYPECTOMY AND BIOPSY;  Surgeon: Zaire Rome MD;  Location: [facility]    KERATOPLASTY DESCEMETS MEMBRANE ENDOTHELIAL (DMEK) Right 03/29/2023    Procedure: ATTEMPTED DESCEMET'S MEMBRANE ENDOTHELIAL KERATOPLASTY (DMEK) RIGHT, VIEW OBSCURED, PROCEDURE ABORTED.;  Surgeon: Mick Paul MD;  [facility]    PHACOEMULSIFICATION WITH STANDARD INTRAOCULAR LENS IMPLANT Right 06/27/2024    Procedure: RIGHT EYE PHACOEMULSIFICATION, COMPLEX CATARACT, WITH STANDARD INTRAOCULAR LENS IMPLANT INSERTION;  Surgeon: Jeremy Meza MD;  Location: [facility] OR    IA ANESTH,CORNEAL TRANSPLANT      REPAIR IRIS Right 06/27/2024    Procedure: REPAIR, IRIS;  Surgeon: Jeremy Meza MD;  Location: [facility] OR       Social History     Tobacco Use    Smoking status: Never    Smokeless tobacco: Never   Substance Use Topics    Alcohol use: Yes     Alcohol/week: 10.0 standard drinks of alcohol      Types: 10 Standard drinks or equivalent per week     Comment: occasional     Family History   Problem Relation Age of Onset    Hypertension Mother     Hyperlipidemia Mother     Hypertension Brother     Hypertension Brother     Heart Failure Maternal Grandmother     Diabetes Maternal Grandfather     Liver Cancer Paternal Grandmother     Glaucoma No family hx of     Macular Degeneration No family hx of          Current Outpatient Medications   Medication Sig Dispense Refill    alcohol swab prep pads Use to swab area of injection/finn as directed. 100 each 3    amLODIPine (NORVASC) 10 MG tablet Take 1 tablet (10 mg) by mouth daily 90 tablet 2    atorvastatin (LIPITOR) 40 MG tablet Take 1 tablet (40 mg) by mouth daily. 90 tablet 3    blood glucose (NO BRAND SPECIFIED) test strip Use to test blood sugar 3-4 times daily or as directed. To accompany: Blood Glucose Monitor Brands: per insurance. 100 strip 6    blood glucose monitoring (NO BRAND SPECIFIED) meter device kit Use to test blood sugar 3-4 times daily or as directed. Preferred blood glucose meter OR supplies to accompany: Blood Glucose Monitor Brands: per insurance. 1 kit 0    erythromycin (ROMYCIN) 5 MG/GM ophthalmic ointment Place 0.5 inches into the right eye 2 times daily      fluticasone (FLONASE) 50 MCG/ACT nasal spray as needed      lisinopril (ZESTRIL) 40 MG tablet Take 1 tablet (40 mg) by mouth daily. 90 tablet 3    metFORMIN (GLUCOPHAGE XR) 500 MG 24 hr tablet Take 1 tablet (500 mg) by mouth 2 times daily (with meals). 180 tablet 1    metoprolol succinate ER (TOPROL XL) 50 MG 24 hr tablet Take 1 tablet (50 mg) by mouth 2 times daily 180 tablet 2    moxifloxacin (VIGAMOX) 0.5 % ophthalmic solution Apply 1 drop to eye 4 times daily In operative eye 3 mL 1    ofloxacin (OCUFLOX) 0.3 % ophthalmic solution Place 1 drop into the right eye every 2 hours (while awake) 10 mL 11    prednisoLONE acetate (PRED FORTE) 1 % ophthalmic suspension Place 1-2 drops  into the right eye 6 times daily 10 mL 11    prednisoLONE acetate (PRED FORTE) 1 % ophthalmic suspension Place 1 drop into the right eye 3 times daily 5 mL 0    Semaglutide, 2 MG/DOSE, (OZEMPIC, 2 MG/DOSE,) 8 MG/3ML pen Inject 2 mg subcutaneously every 7 days. 9 mL 1    sodium chloride 0.9 % neb solution 3 mLs by Other route 2 times daily Medically necessary for scleral contact lens use 300 mL 11    tadalafil (CIALIS) 2.5 MG tablet Take 4 tablets (10 mg) by mouth daily as needed (sexual intercourse). 20 tablet 3    thin (NO BRAND SPECIFIED) lancets Use to test blood sugar 3-4 times daily or as directed. To accompany: Blood Glucose Monitor Brands: per insurance. 100 each 6     Allergies   Allergen Reactions    Hydrochlorothiazide      Gout flares    Hydrocodone-Acetaminophen Hives and Swelling    Naproxen GI Disturbance     Other reaction(s): GI Upset     Recent Labs   Lab Test 08/22/24  0836 06/24/24  1033 01/03/24  0738 12/01/23  1106 10/04/23  1322 07/31/23  1352 04/27/23  0848 02/21/23  1009 04/27/22  1122   A1C  --  7.2*  --  7.4*  --  7.8* 9.8*   < >  --    LDL 41  --   --   --   --  50 125*  --  112   HDL 31*  --   --   --   --  36* 28*  --  34*   TRIG 80  --   --   --   --  106 217*  --  123   ALT  --   --   --   --   --   --  57*  --  34   CR  --   --  1.18*  --  0.95 1.07 1.08   < > 1.02   GFRESTIMATED  --   --  74  --  >90 84 83   < > 90   POTASSIUM  --   --  3.9  --  3.8 4.3 4.2   < > 4.1    < > = values in this interval not displayed.          Review of Systems  CONSTITUTIONAL: NEGATIVE for fever, chills, change in weight  INTEGUMENTARY/SKIN: POSITIVE for flat scaling areas on left arm and legs that itchs  EYES: POSITIVE for eye issues and followed by ophthalmology.  ENT/MOUTH: NEGATIVE for ear, mouth and throat problems  RESP: NEGATIVE for significant cough or SOB  CV: NEGATIVE for chest pain, palpitations or peripheral edema  GI: NEGATIVE for nausea, abdominal pain, heartburn, or change in bowel  "habits  : NEGATIVE for frequency, dysuria, or hematuria  MUSCULOSKELETAL: NEGATIVE for significant arthralgias or myalgia  NEURO: POSITIVE for \"hot iron\" burning pain in the posterior right hip when sitting down to go to the bathroom or sitting driving in the car for long periods.  He states that once he gets up this goes away.  Ibuprofen has not helped.  Hx of low back pain but currently no pain and wears brace at work which takes care of that for the most part.  ENDOCRINE: NEGATIVE for temperature intolerance, skin/hair changes  HEME: NEGATIVE for bleeding problems  PSYCHIATRIC: NEGATIVE for changes in mood or affect     Objective    Exam  /78   Pulse 71   Temp 98.2  F (36.8  C) (Tympanic)   Resp 20   Ht 1.825 m (5' 11.85\")   Wt 144.2 kg (318 lb)   SpO2 99%   BMI 43.31 kg/m     Estimated body mass index is 43.31 kg/m  as calculated from the following:    Height as of this encounter: 1.825 m (5' 11.85\").    Weight as of this encounter: 144.2 kg (318 lb).    Physical Exam  GENERAL: alert and no distress  EYES: Eyes grossly normal to inspection, PERRL and conjunctivae and sclerae normal  HENT: ear canals and TM's normal, nose and mouth without ulcers or lesions  NECK: no adenopathy, no asymmetry, masses, or scars  RESP: lungs clear to auscultation - no rales, rhonchi or wheezes  CV: regular rate and rhythm, normal S1 S2, no S3 or S4, no murmur, click or rub, no peripheral edema  ABDOMEN: soft, nontender, no hepatosplenomegaly, no masses and bowel sounds normal  MS: no gross musculoskeletal defects noted, no edema  SKIN: flat scale rash on the left forearm, no redness or inflammation noted.  NEURO: Normal strength and tone, mentation intact and speech normal  PSYCH: mentation appears normal, affect normal/bright  LYMPH: normal ant/post cervical, supraclavicular nodes        Signed Electronically by: Thea Olvera NP    "

## 2024-12-19 NOTE — PATIENT INSTRUCTIONS
Get diabetic eye exam paperwork faxed over here to [place].    Make appointment with Physical Therapy for left sciatic pain treatment.    I ordered you some triamcinolone steroid cream to use.  Apply and overlay moisturizer on it.    I refilled medications today. I will notify you of lab results.    Patient Education   Preventive Care Advice   This is general advice given by our system to help you stay healthy. However, your care team may have specific advice just for you. Please talk to your care team about your preventive care needs.  Nutrition  Eat 5 or more servings of fruits and vegetables each day.  Try wheat bread, brown rice and whole grain pasta (instead of white bread, rice, and pasta).  Get enough calcium and vitamin D. Check the label on foods and aim for 100% of the RDA (recommended daily allowance).  Lifestyle  Exercise at least 150 minutes each week  (30 minutes a day, 5 days a week).  Do muscle strengthening activities 2 days a week. These help control your weight and prevent disease.  No smoking.  Wear sunscreen to prevent skin cancer.  Have a dental exam and cleaning every 6 months.  Yearly exams  See your health care team every year to talk about:  Any changes in your health.  Any medicines your care team has prescribed.  Preventive care, family planning, and ways to prevent chronic diseases.  Shots (vaccines)   HPV shots (up to age 26), if you've never had them before.  Hepatitis B shots (up to age 59), if you've never had them before.  COVID-19 shot: Get this shot when it's due.  Flu shot: Get a flu shot every year.  Tetanus shot: Get a tetanus shot every 10 years.  Pneumococcal, hepatitis A, and RSV shots: Ask your care team if you need these based on your risk.  Shingles shot (for age 50 and up)  General health tests  Diabetes screening:  Starting at age 35, Get screened for diabetes at least every 3 years.  If you are younger than age 35, ask your care team if you should be screened for  diabetes.  Cholesterol test: At age 39, start having a cholesterol test every 5 years, or more often if advised.  Bone density scan (DEXA): At age 50, ask your care team if you should have this scan for osteoporosis (brittle bones).  Hepatitis C: Get tested at least once in your life.  STIs (sexually transmitted infections)  Before age 24: Ask your care team if you should be screened for STIs.  After age 24: Get screened for STIs if you're at risk. You are at risk for STIs (including HIV) if:  You are sexually active with more than one person.  You don't use condoms every time.  You or a partner was diagnosed with a sexually transmitted infection.  If you are at risk for HIV, ask about PrEP medicine to prevent HIV.  Get tested for HIV at least once in your life, whether you are at risk for HIV or not.  Cancer screening tests  Cervical cancer screening: If you have a cervix, begin getting regular cervical cancer screening tests starting at age 21.  Breast cancer scan (mammogram): If you've ever had breasts, begin having regular mammograms starting at age 40. This is a scan to check for breast cancer.  Colon cancer screening: It is important to start screening for colon cancer at age 45.  Have a colonoscopy test every 10 years (or more often if you're at risk) Or, ask your provider about stool tests like a FIT test every year or Cologuard test every 3 years.  To learn more about your testing options, visit:   .  For help making a decision, visit:   https://bit.ly/mi05724.  Prostate cancer screening test: If you have a prostate, ask your care team if a prostate cancer screening test (PSA) at age 55 is right for you.  Lung cancer screening: If you are a current or former smoker ages 50 to 80, ask your care team if ongoing lung cancer screenings are right for you.  For informational purposes only. Not to replace the advice of your health care provider. Copyright   2023 [employer]. All rights reserved.  Clinically reviewed by the [facility]. [employer] [id number] - REV 01/24.

## 2024-12-21 DIAGNOSIS — H18.603 KERATOCONUS OF BOTH EYES: ICD-10-CM

## 2024-12-26 RX ORDER — SODIUM CHLORIDE FOR INHALATION 0.9 %
VIAL, NEBULIZER (ML) INHALATION
Qty: 300 ML | Refills: 0 | Status: SHIPPED | OUTPATIENT
Start: 2024-12-26

## 2024-12-26 NOTE — TELEPHONE ENCOUNTER
Medication:  Sodium Chloride 0.9 % Inhalation Nebulization Solution     Requested directions: USE 1 VIAL(3ML) IN NEBULIZER TWICE DAILY FOR SCLERAL CONTACT LENS USE   Current directions on the medication list: Route: 3 mLs by Other route 2 times daily Medically necessary for scleral contact lens use - Other     Last Written Prescription Date:  10-27-23  Last Fill Quantity: 300 ml,   # refills: 11    Last Office Visit:10-30-24  Future Office visit: 4-30-25    Attending Provider: Simón  Last Clinic Note: A/P  1.) Keratoconus OU, s/p PKP right eye  -Previously in scleral lens OU fit in [place]     Right eye:  -s/p PKP 04/2023. On Pred bid OD  -s/p CE/IOL 06/2024  -Overall doing well in return to scleral lens. BCVA 20/25- with sphero-cyl  -Thick cornea today (707) but without rasheed edema. Monitor. Already in high Dk material  -Tilted graft, need more superior clearance on lens. Will flatten bc further to help accommodate     Left eye:  -High/degnerative myopia and DONNIE limiting acuity. BCVA approx 20/50-20/60 range.   -Stable, continue in current lens     Order new right and mail direct. Seeing Dr. Meza in 3 months, rec follow-up with me in 6 months    Routing refill request to provider for review/approval because:    Inconsistent dose/directions

## 2024-12-26 NOTE — TELEPHONE ENCOUNTER
Rx sent for NaCl neb solution-- used for scleral lens use.    Alexei Serrano RN 10:32 AM 12/26/24

## 2024-12-30 ENCOUNTER — PATIENT OUTREACH (OUTPATIENT)
Dept: NURSING | Facility: CLINIC | Age: 54
End: 2024-12-30
Payer: COMMERCIAL

## 2024-12-30 ASSESSMENT — ACTIVITIES OF DAILY LIVING (ADL): DEPENDENT_IADLS:: INDEPENDENT

## 2024-12-30 NOTE — LETTER
[facility] CARE COORDINATION  [facility]  [address] [place] [address]    January 2, 2025    Tino Gleason  [address] [address] [place] [address]  [facility] [address]      Dear Tino,    I am a clinic care coordinator who works with Thea Olvera NP with the [facility]. I wanted to thank you for spending the time to talk with me.  Below is a description of clinic care coordination and how I can further assist you.       The clinic care coordination team is made up of a registered nurse, , financial resource worker and community health worker who understand the health care system. The goal of clinic care coordination is to help you manage your health and improve access to the health care system. Our team works alongside your provider to assist you in determining your health and social needs. We can help you obtain health care and community resources, providing you with necessary information and education. We can work with you through any barriers and develop a care plan that helps coordinate and strengthen the communication between you and your care team.  Our services are voluntary and are offered without charge to you personally.    Please feel free to contact me with any questions or concerns regarding care coordination and what we can offer.      We are focused on providing you with the highest-quality healthcare experience possible.    Sincerely,     Theodora Hughes [facility]   Social Work Primary Care Clinic Care Coordinator   [facility]  [phone]  [email or web address]   Shala ORTIZ [phone]     Enclosed: I have enclosed a copy of the Patient Centered Plan of Care. This has helpful information and goals that we have talked about. Please keep this in an easy to access place to use as needed.

## 2024-12-30 NOTE — PROGRESS NOTES
Clinic Care Coordination Contact  Clinic Care Coordination Contact  OUTREACH    Referral Information:  Referral Source: PCP    Primary Diagnosis: Psychosocial    Chief Complaint   Patient presents with    Clinic Care Coordination - Initial        Universal Utilization:   Clinic Utilization  Difficulty keeping appointments: No  Compliance Concerns: No  No-Show Concerns: No  No PCP office visit in Past Year: No    Utilization      No Show Count (past year)  0             ED Visits  3             Hospital Admissions  1                    Current as of: 12/30/2024  1:08 PM                Clinical Concerns:  Current Medical Concerns: See PCP OV notes       Patient Active Problem List   Diagnosis    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    CHW: Patient was at work and CHW could not fill out initial assessment. Sent CC questionnaires via [employer].  Patient accepts CC: Yes. Patient scheduled for assessment with CC SW on 12/30/24 at 2:00 pm. Patient noted desire to discuss rental assistance as he is behind on rent this month, and food resources. CHW talked to patient about [employer] and [employer] and would like to get more info with CC SW.      PCP OV 12/19/24: Encounter for screening involving social determinants of health (SDoH)  - Primary Care - Care Coordination Referral; Future    Patient has to care for his wife so he does not get extra exercise  due to working and being busy with her care.     Plan: Get diabetic eye exam paperwork faxed over here to [place].     Make appointment with Physical Therapy for left sciatic pain treatment.     I ordered you some triamcinolone steroid cream to use.  Apply and overlay moisturizer on it.     I refilled medications today. I will notify you of lab results.    --------------------------------------------------     CC outreach to pt for initial assessment per CHW scheduling. Pt was not very talkative, limited responses given.    Ok to send info on Senior Living.    AZE[employer] CC to send food and rental resources. Discussed rental resources are limited. Pt used [facility] emergency assistance last in July 2024.    Discussed [employer]. Would like referral.    Pain  Pain: Not discussed     Health Maintenance Reviewed: Due/Overdue     Health Maintenance Due   Topic Date Due    ZOSTER IMMUNIZATION (1 of 2) Never done    EYE EXAM  Never done       Clinical Pathway: None    Medication Management:  Medication review status: Medications reviewed and no changes reported per patient.           Functional Status:  Dependent ADLs: Independent  Dependent IADLs: Independent  Bed or wheelchair confined: No  Mobility Status: Independent  Fallen 2 or more times in the past year?: No  Any fall with injury in the past year?: No    Living Situation:  Current living arrangement: I live in a private home with spouse  Type of residence: Private home - [facility]    Lifestyle & Psychosocial Needs:    Social Drivers of Health     Food Insecurity: High Risk (12/19/2024)    Food Insecurity     Within the past 12 months, did you worry that your food would run out before you got money to buy more?: Yes     Within the past 12 months, did the food you bought just not last and you didn t have money to get more?: No   Depression: Not at risk (7/8/2024)    PHQ-2     PHQ-2 Score: 0   Housing Stability: High Risk (12/19/2024)    Housing Stability     Do you have housing? : No      Are you worried about losing your housing?: Yes   Tobacco Use: Low Risk  (12/19/2024)    Patient History     Smoking Tobacco Use: Never     Smokeless Tobacco Use: Never     Passive Exposure: Not on file   Financial Resource Strain: Low Risk  (12/19/2024)    Financial Resource Strain     Within the past 12 months, have you or your family members you live with been unable to get utilities (heat, electricity) when it was really needed?: No   Alcohol Use: Not on file   Transportation Needs: Low Risk  (12/19/2024)    Transportation Needs     Within the past 12 months, has lack of transportation kept you from medical appointments, getting your medicines, non-medical meetings or appointments, work, or from getting things that you need?: No   Physical Activity: Inactive (12/19/2024)    Exercise Vital Sign     Days of Exercise per Week: 0 days     Minutes of Exercise per Session: 0 min   Interpersonal Safety: Low Risk  (12/19/2024)    Interpersonal Safety     Do you feel physically and emotionally safe where you currently live?: Yes     Within the past 12 months, have you been hit, slapped, kicked or otherwise physically hurt by someone?: No     Within the past 12 months, have you been humiliated or emotionally abused in other ways by your partner or ex-partner?: No   Stress: No Stress Concern Present (12/19/2024)    [place] - Occupational Stress Questionnaire     Feeling of Stress : Only a little   Social Connections: Unknown (12/19/2024)    Social Connection and Isolation Panel [NHANES]     Frequency of Communication with Friends and Family: Not on file     Frequency of Social Gatherings with Friends and Family: Patient declined     Attends Temple Services: Not on file     Active Member of Clubs or Organizations: Not on file     Attends Club or Organization Meetings: Not on file     Marital Status: Not on file   Health Literacy: Not on file       Diet: Regular  Inadequate nutrition:  No  Tube Feeding: No  Inadequate activity/exercise: No  Significant changes in sleep pattern: No  Transportation means: Regular car     Evangelical or spiritual beliefs that impact treatment: No  Mental health DX: No  Mental health management concern: No  Chemical Dependency Status: No Current Concerns  Informal Support system: Spouse     Care Coordinator has reviewed patient's Social Determinants of Health (SDoH) on this date. Upon review, changes were not made.      Resources and Interventions:  Current Resources:   Community Resources: Financial/Insurance  Supplies Currently Used at Home: None  Equipment Currently Used at Home: none  Employment Status: employed full-time     Advance Care Plan/Directive  Advanced Care Plans/Directives on file: No  Discussed with patient/caregiver: Referral to Honoring Choices    Referrals Placed: Community Resources, County Resources, Other, Financial Services     Care Plan:  Care Plan: Financial Wellbeing       Problem: Patient expresses financial resource strain       Goal: Create an action plan to increase financial stability       Start Date: 12/30/2024 Expected End Date: 6/1/2025    Priority: Medium    Note:     Barriers: Access   Strengths: Motivated   Patient expressed understanding of goal: Yes     Action steps to achieve this goal:  1. I will access [employer] and other food resources.   2. I will look into rental assistance options.   3. I will work with care coordination as needed.                                Patient/Caregiver understanding: Pt reports understanding and denies any additional questions or concerns at this times. SW CC engaged in [employer] communication during encounter.    Outreach Frequency: Monthly, more frequently as needed    Future Appointments                In 1 week Rylee Yeager PT [facility], [place] [facility]    In 1 month Jeremy Meza MD [facility] Eye [facility] CLIN    In 1 month Wade  Thea Buck NP [facility]  Arrive at: Clinic A    In 4 months Ana Gr OD [facility], EYE CONTACT    In 8 months Thea Olvera NP [facility]  Arrive at: Clinic A            Plan: Patient was provided with this writer's contact information and encouraged to call with any questions or concerns.      CC will mail care coordination introduction letter and care plan to patient. SW CC to chart review every 4-6 weeks.     CHW to outreach in 3-4 weeks.     BEATRICE Magallon   Social Work Primary Care Clinic Care Coordinator   [facility]  [phone]  [email or web address]

## 2024-12-30 NOTE — LETTER
[facility]  Patient Centered Plan of Care  About Me:        Patient Name:  Tino Gold    YOB: 1970  Age:         54 year old   iLsa MRN:    [id number] Telephone Information:  Home Phone [phone]   Mobile [phone]   Home Phone Not on file.       Address:  [address] [address]  [place] [address] Email address:  [email or web address]      Emergency Contact(s)    Name Relationship Lgl Grd Work Phone Home Phone Mobile Phone   1. VILLA GOLD Brother    [phone]   2. ILIANA GOLD Spouse    [phone]           Primary language:  English     needed? No   [employer]:  [phone] op. 1  Other communication barriers:None  Preferred Method of Communication:     Current living arrangement: I live in a private home with spouse  Mobility Status/ Medical Equipment: Independent    Health Maintenance  Health Maintenance Reviewed: Due/Overdue   Health Maintenance Due   Topic Date Due    ZOSTER IMMUNIZATION (1 of 2) Never done    EYE EXAM  Never done    PHQ-2 (once per calendar year)  01/01/2025       My Access Plan  Medical Emergency 911   Primary Clinic Line [facility] - [phone]   24 Hour Appointment Line [phone] or  [phone]) (toll-free)   24 Hour Nurse Line [phone] (toll-free)   Preferred Urgent Care [facility], [phone]     Preferred Hospital [place], [place]  [phone]     Preferred Pharmacy [facility] [place], [place] - [address]      Behavioral Health Crisis Line The National Suicide Prevention Lifeline at 1-809.969.4269 or Text/Call 738           My Care Team Members  Patient Care Team         Relationship Specialty Notifications Start End    Thea Olvera NP PCP - General Family Medicine  5/4/23     Phone: [phone] Fax: [phone] [address] [facility] [facility] [address]    Mick Paul  MD [facility] Ophthalmology  7/5/22     Phone: [phone] Fax: [phone]         [address] [facility] [address]    Mick Paul MD Assigned Surgical Provider   1/28/23     Phone: [phone] Fax: [phone]         [address] [facility] [address]    Thea Olvera, NP Assigned PCP   4/6/23     Phone: [phone] Fax: [phone]         [address] [facility] [address]    Dana Connolly, RD Diabetes Educator Dietitian, Registered  5/30/23     Phone: [phone] Fax: [phone]         [facility] [address] [place] [address]    Shala Weiss, CHW Community Health Worker  Admissions 12/19/24     Theodora Hughes LSW Lead Care Coordinator Primary Care -  Admissions 12/20/24     Phone: [phone] [address] [facility] [address]                My Care Plans  Self Management and Treatment Plan    Care Plan  Care Plan: Financial Wellbeing       Problem: Patient expresses financial resource strain       Goal: Create an action plan to increase financial stability       Start Date: 12/30/2024 Expected End Date: 6/1/2025    Priority: Medium    Note:     Barriers: Access   Strengths: Motivated   Patient expressed understanding of goal: Yes     Action steps to achieve this goal:  1. I will access [employer] and other food resources.   2. I will look into rental assistance options.   3. I will work with care coordination as needed.                                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    Care Coordination Start Date: 12/19/2024   Frequency of Care Coordination: monthly, more frequently as needed     Form Last Updated: 01/02/2025

## 2025-01-20 ENCOUNTER — PATIENT OUTREACH (OUTPATIENT)
Dept: CARE COORDINATION | Facility: CLINIC | Age: 55
End: 2025-01-20
Payer: COMMERCIAL

## 2025-01-20 NOTE — PROGRESS NOTES
Clinic Care Coordination Contact  Community Health Worker Follow Up    Care Gaps:     Health Maintenance Due   Topic Date Due    ZOSTER IMMUNIZATION (1 of 2) Never done    EYE EXAM  Never done    PHQ-2 (once per calendar year)  01/01/2025       Patient focused on addressing their current goals.    Care Plan:   Care Plan: Financial Wellbeing       Problem: Patient expresses financial resource strain       Goal: Create an action plan to increase financial stability       Start Date: 12/30/2024 Expected End Date: 6/1/2025    This Visit's Progress: 10%    Priority: Medium    Note:     Barriers: Access   Strengths: Motivated   Patient expressed understanding of goal: Yes     Action steps to achieve this goal:  1. I will access [employer] and other food resources.   2. I will look into rental assistance options.   3. I will work with care coordination as needed.                                Intervention and Education during outreach:     CHW was able to connect with the Patient and introduce self/care coordination and intent of call. Patient shares that he reached out the [employer] previous to talking with Care Coordination and states they do not have funding at this time. Encouraged the Patient to reach out to [facility] (sent in the [employer] message with resources by CC AZEB). Patient shares he will take a look at the [employer] message. Also reviewed [employer] which Patient is very interested in. Shares he forgot about this but will look into it.    Patient would like Writer to send ANGEL Last's phone number via [employer] as he does not have a pen and paper. Writer is agreeable with plan. [employer] message sent 1/20/2025.    No additional CC needs identified during the time of call.    CHW Plan: CHW will reach out to the Patient in 1 month to monitor the progression of their goals.        Devi AGRAWAL [facility] Community Health Worker  Ambulatory Care Coordination  Covering for Shala ORTIZ

## 2025-02-12 ENCOUNTER — PATIENT OUTREACH (OUTPATIENT)
Dept: CARE COORDINATION | Facility: CLINIC | Age: 55
End: 2025-02-12
Payer: COMMERCIAL

## 2025-02-12 NOTE — PROGRESS NOTES
Clinic Care Coordination Contact  Care Coordination Clinician Chart Review    Situation: Patient chart reviewed by Care Coordinator.       Background: Care Coordination Program started: 12/19/2024. Initial assessment completed and patient-centered care plan(s) were developed with participation from patient. Lead CC handed patient off to CHW for continued outreaches.       Assessment: Per chart review, patient outreach completed by CC CHW on 1/20/2025.  Per CHW note, patient is focusing on addressing current goals.      CHW was able to connect with Patient and introduce self/care coordination and intent of call. Patient shared that he reached out to the [employer] previous to talking with Care Coordination and stated they do not have funding at this time.  Encouraged Patient to reach out to [facility] (sent in the [employer] message with resources by CC AZEB). Patient shared he will take a look at the [employer] message. Also reviewed [employer] which Patient is very interested in. Shared he forgot about this but will look into it.     Patient would like Writer to send CHW Shala's phone number via [employer] as he does not have a pen and paper. Writer is agreeable with plan. [employer] message sent 1/20/2025.     No additional CC needs identified during the time of call.    Patient is actively working to accomplish goal(s). Patient's goal(s) appropriate and relevant at this time. Patient is due for updated Plan of Care.  Assessments will be completed annually or as needed/with change of patient status.      Care Plan: Financial Wellbeing       Problem: Patient expresses financial resource strain       Goal: Create an action plan to increase financial stability       Start Date: 12/30/2024 Expected End Date: 6/1/2025    This Visit's Progress: 10%    Priority: Medium    Note:     Barriers: Access   Strengths: Motivated   Patient expressed understanding of goal: Yes     Action steps to achieve this goal:  1. I will access [employer] and  other food resources.   2. I will look into rental assistance options.   3. I will work with care coordination as needed.                                   Plan/Recommendations: The patient will continue working with Care Coordination to achieve goal(s) as above. CHW will continue outreaches at minimum every 30 days and will involve Lead CC as needed or if patient is ready to move to Maintenance. Lead CC will continue to monitor CHW outreaches and patient's progress to goal(s) every 6 weeks.     Plan of Care updated and sent to patient: Yes, via [employer]. SW noted in chart that patient utilizes [employer], also referenced above.      Taylor Hamlin, SADIEW, MSW   [facility]  Care Coordination  [facility] and [facility]  [phone]  2/12/2025 10:57 AM

## 2025-02-12 NOTE — LETTER
[facility] CARE COORDINATION  [address] [facility] [address]     February 12, 2025        Tino Gold  [id number] [place] [address]        Dear Tino,     Feliz is an updated Patient Centered Plan of Care for your continued enrollment in Care Coordination. Please let us know if you have additional questions, concerns, or goals that we can assist with.    Sincerely,    Taylor Hamlin, LSW, MSW Clinic   [facility]  Care Coordination  [facility]   [email or web address]  Office: [phone]  Employed by [employer] [facility]  Patient Centered Plan of Care  About Me:        Patient Name:  Tino Gold    YOB: 1970  Age:         54 year old   Lisa MRN:    [id number] Telephone Information:  Home Phone [phone]   Mobile [phone]   Home Phone Not on file.       Address:  [address] [address] [address] Email address:  [email or web address]      Emergency Contact(s)    Name Relationship Lgl Grd Work Phone Home Phone Mobile Phone   1. VILLA GOLD Brother    [phone]   2. ILIANA GOLD Spouse    [phone]           Primary language:  English     needed? No   [facility]:  [phone] op. 1  Other communication barriers:None    Preferred Method of Communication:   Kei  Current living arrangement: I live in a private home with spouse    Mobility Status/ Medical Equipment: Independent        Health Maintenance  Health Maintenance Reviewed: Due/Overdue   Health Maintenance Due   Topic Date Due    ZOSTER IMMUNIZATION (1 of 2) Never done    EYE EXAM  Never done    PHQ-2 (once per calendar year)  01/01/2025          My Access Plan  Medical Emergency 911   Primary Clinic Line [facility] - [phone]   24 Hour Appointment Line [phone] or  [phone])  (toll-free)   24 Hour Nurse Line [phone] (toll-free)   Preferred Urgent Care [facility], [phone]     Preferred Hospital [facility]  [phone]     Preferred Pharmacy [id number] - [place], [place] - [address]     Behavioral Health Crisis Line The National Suicide Prevention Lifeline at 1-732.556.5845 or Text/Call 988           My Care Team Members  Patient Care Team         Relationship Specialty Notifications Start End    Thea Olvera, NP PCP - General Family Medicine  5/4/23     Phone: [phone] Fax: [phone] [address] [facility] [address]    Mick Paul MD MD Ophthalmology  7/5/22     Phone: [phone] Fax: [phone]         [address] [place] [address]    Thea Olvera NP Assigned PCP   4/6/23     Phone: [phone] Fax: [phone]         [address] [facility] [address]    Dana Connolly RD Diabetes Educator Dietitian, Registered  5/30/23     Phone: [phone] Fax: [phone]         [facility] [address] [place] [address]    Shala Weiss CHW Community Health Worker  Admissions 12/19/24     Theodora Hughes LSW Lead Care Coordinator Primary Care - CC Admissions 12/20/24     Phone: [phone]          [address] [place] [address]    Jeremy Meza MD Assigned Surgical Provider   1/23/25     Phone: [phone] Fax: [phone]         [address] [place] [address]    Taylor Hamlin BSW Lead Care Coordinator Primary Care - CC Admissions 2/11/25     Phone: [phone] Fax: [phone]                    My Care Plans  Self Management and Treatment Plan    Care Plan  Care Plan: Financial Wellbeing       Problem: Patient expresses financial resource strain       Goal: Create an action plan to increase financial stability       Start Date: 12/30/2024 Expected End Date: 6/1/2025    This  Visit's Progress: 10%    Priority: Medium    Note:     Barriers: Access   Strengths: Motivated   Patient expressed understanding of goal: Yes     Action steps to achieve this goal:  1. I will access [employer] and other food resources.   2. I will look into rental assistance options.   3. I will work with care coordination as needed.                                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    Care Coordination Start Date: 12/19/2024   Frequency of Care Coordination: monthly, more frequently as needed     Form Last Updated: 02/12/2025

## 2025-02-19 ENCOUNTER — OFFICE VISIT (OUTPATIENT)
Dept: OPHTHALMOLOGY | Facility: CLINIC | Age: 55
End: 2025-02-19
Attending: OPHTHALMOLOGY
Payer: COMMERCIAL

## 2025-02-19 ENCOUNTER — PATIENT OUTREACH (OUTPATIENT)
Dept: CARE COORDINATION | Facility: CLINIC | Age: 55
End: 2025-02-19

## 2025-02-19 DIAGNOSIS — H26.491 RIGHT POSTERIOR CAPSULAR OPACIFICATION: ICD-10-CM

## 2025-02-19 DIAGNOSIS — T86.8409 REJECTION OF CORNEAL GRAFT: ICD-10-CM

## 2025-02-19 DIAGNOSIS — Z94.7 PENETRATING KERATOPLASTY GRAFT IN PLACE: Primary | ICD-10-CM

## 2025-02-19 PROCEDURE — 76514 ECHO EXAM OF EYE THICKNESS: CPT | Performed by: OPHTHALMOLOGY

## 2025-02-19 PROCEDURE — 66821 AFTER CATARACT LASER SURGERY: CPT | Mod: RT | Performed by: OPHTHALMOLOGY

## 2025-02-19 PROCEDURE — 99214 OFFICE O/P EST MOD 30 MIN: CPT | Mod: 57 | Performed by: OPHTHALMOLOGY

## 2025-02-19 PROCEDURE — 99214 OFFICE O/P EST MOD 30 MIN: CPT | Performed by: OPHTHALMOLOGY

## 2025-02-19 ASSESSMENT — REFRACTION_WEARINGRX
OD_ADD: +2.25
OS_CYLINDER: +2.75
OD_CYLINDER: +3.00
OS_AXIS: 035
OS_SPHERE: -17.50
OS_ADD: +2.25
OD_AXIS: 150
OD_SPHERE: -14.75

## 2025-02-19 ASSESSMENT — SLIT LAMP EXAM - LIDS
COMMENTS: NORMAL
COMMENTS: NORMAL

## 2025-02-19 ASSESSMENT — PACHYMETRY: OD_CT(UM): 660

## 2025-02-19 ASSESSMENT — VISUAL ACUITY
OS_CC: 20/60
CORRECTION_TYPE: CONTACTS
OS_CC+: +1
METHOD: SNELLEN - LINEAR
OD_CC: 20/25
OD_CC+: -1
OS_PH_CC: 20/40

## 2025-02-19 ASSESSMENT — TONOMETRY
OD_IOP_MMHG: 08
OS_IOP_MMHG: 07
IOP_METHOD: ICARE

## 2025-02-19 ASSESSMENT — EXTERNAL EXAM - LEFT EYE: OS_EXAM: NORMAL

## 2025-02-19 ASSESSMENT — EXTERNAL EXAM - RIGHT EYE: OD_EXAM: NORMAL

## 2025-02-19 NOTE — NURSING NOTE
Chief Complaints and History of Present Illnesses   Patient presents with    Cornea Transplant Follow Up     Chief Complaint(s) and History of Present Illness(es)       Cornea Transplant Follow Up              Laterality: right eye    Onset: 6 months ago              Comments    Pt. States that he is doing well. Still seeing the same large floater RE since surgery. No change in VA BE. No pain BE.   Letty Wisdom COT 8:24 AM February 19, 2025

## 2025-02-19 NOTE — PROGRESS NOTES
"Clinic Care Coordination Contact  Community Health Worker Follow Up    Care Gaps:     Health Maintenance Due   Topic Date Due    ZOSTER IMMUNIZATION (1 of 2) Never done    EYE EXAM  Never done       Postponed to next outreach. Patient was driving.     Care Plan:   Care Plan: Financial Wellbeing       Problem: Patient expresses financial resource strain       Goal: Create an action plan to increase financial stability       Start Date: 12/30/2024 Expected End Date: 6/1/2025    This Visit's Progress: 40% Recent Progress: 10%    Priority: Medium    Note:     Barriers: Access   Strengths: Motivated   Patient expressed understanding of goal: Yes     Action steps to achieve this goal:  1. I will access [employer] and other food resources. It is difficult to make it to [employer] with my work schedule.  2. I will look into rental assistance options. I received rental assistance. I am going to reach back out to [facility] for energy assistance.  3. I will work with care coordination as needed.                                Intervention and Education during outreach: Patient reports he is doing overall well. Says his \"rent is paid\" when CHW asked if he reached out to [facility] for rental assistance or energy assistance. Stated he is going to call back to ask about energy assistance. CHW resending their number to patients Mychart along with additional food resources as patient stated it is difficult to make it to [employer] with his work schedule.    CHW Plan: Follow Up in one month.    ANGEL Pepe  [phone]  [facility]    "

## 2025-02-19 NOTE — PROGRESS NOTES
Chief complaint   Post PK  S/p CEIOL     HPI    Tino Gleason 54 year old male     Interval hx 02/19/2025  Chief Complaint(s) and History of Present Illness(es)       Cornea Transplant Follow Up    In right eye.  This started 6 months ago.             Comments    Pt. States that he is doing well. Still seeing the same large floater RE since surgery. No change in VA BE. No pain BE.   Letty Wisdom COT 8:24 AM February 19, 2025                                     Past ocular history   Prior eye surgery/laser/Trauma: Pkx1 and 1 dmek  CTL wearer:No  Glasses : -  Family Hx of eye disease: -    PMH     Past Medical History:   Diagnosis Date    Diabetes mellitus, type 2 (H)     HTN (hypertension)     Hyperlipidemia LDL goal <70     Stable keratoconus of both eyes        PSH     Past Surgical History:   Procedure Laterality Date    CATARACT IOL, RT/LT      COLONOSCOPY N/A 03/14/2024    Procedure: COLONOSCOPY, WITH POLYPECTOMY AND BIOPSY;  Surgeon: Zaire Rome MD;  Location: [facility]    KERATOPLASTY DESCEMETS MEMBRANE ENDOTHELIAL (DMEK) Right 03/29/2023    Procedure: ATTEMPTED DESCEMET'S MEMBRANE ENDOTHELIAL KERATOPLASTY (DMEK) RIGHT, VIEW OBSCURED, PROCEDURE ABORTED.;  Surgeon: Mick Paul MD;  Location: [facility]    PHACOEMULSIFICATION WITH STANDARD INTRAOCULAR LENS IMPLANT Right 06/27/2024    Procedure: RIGHT EYE PHACOEMULSIFICATION, COMPLEX CATARACT, WITH STANDARD INTRAOCULAR LENS IMPLANT INSERTION;  Surgeon: Jeremy Meza MD;  Location: [facility] OR    RI ANESTH,CORNEAL TRANSPLANT      REPAIR IRIS Right 06/27/2024    Procedure: REPAIR, IRIS;  Surgeon: Jeremy Meza MD;  Location: [facility] OR       [facility]     Current Outpatient Medications   Medication Sig Dispense Refill    alcohol swab prep pads Use to swab area of injection/finn as directed. 100 each 3    amLODIPine (NORVASC) 10 MG tablet Take 1 tablet (10 mg) by mouth daily 90 tablet 2    atorvastatin (LIPITOR) 40 MG tablet Take 1 tablet (40 mg) by mouth  daily. 90 tablet 3    blood glucose (NO BRAND SPECIFIED) test strip Use to test blood sugar 3-4 times daily or as directed. To accompany: Blood Glucose Monitor Brands: per insurance. 100 strip 6    blood glucose monitoring (NO BRAND SPECIFIED) meter device kit Use to test blood sugar 3-4 times daily or as directed. Preferred blood glucose meter OR supplies to accompany: Blood Glucose Monitor Brands: per insurance. 1 kit 0    erythromycin (ROMYCIN) 5 MG/GM ophthalmic ointment Place 0.5 inches into the right eye 2 times daily      fluticasone (FLONASE) 50 MCG/ACT nasal spray as needed      lisinopril (ZESTRIL) 40 MG tablet Take 1 tablet (40 mg) by mouth daily. 90 tablet 3    metFORMIN (GLUCOPHAGE XR) 500 MG 24 hr tablet Take 1 tablet (500 mg) by mouth 2 times daily (with meals). 180 tablet 1    metoprolol succinate ER (TOPROL XL) 50 MG 24 hr tablet Take 1 tablet (50 mg) by mouth 2 times daily 180 tablet 2    moxifloxacin (VIGAMOX) 0.5 % ophthalmic solution Apply 1 drop to eye 4 times daily In operative eye 3 mL 1    ofloxacin (OCUFLOX) 0.3 % ophthalmic solution Place 1 drop into the right eye every 2 hours (while awake) 10 mL 11    prednisoLONE acetate (PRED FORTE) 1 % ophthalmic suspension Place 1-2 drops into the right eye 6 times daily 10 mL 11    prednisoLONE acetate (PRED FORTE) 1 % ophthalmic suspension Place 1 drop into the right eye 3 times daily 5 mL 0    Semaglutide, 2 MG/DOSE, (OZEMPIC, 2 MG/DOSE,) 8 MG/3ML pen Inject 2 mg subcutaneously every 7 days. 9 mL 1    sodium chloride 0.9 % neb solution 3 ml twice daily for scleral lens use--medially necessary for scleral lens use. 300 mL 0    tadalafil (CIALIS) 2.5 MG tablet Take 4 tablets (10 mg) by mouth daily as needed (sexual intercourse). 20 tablet 3    thin (NO BRAND SPECIFIED) lancets Use to test blood sugar 3-4 times daily or as directed. To accompany: Blood Glucose Monitor Brands: per insurance. 100 each 6    triamcinolone (KENALOG) 0.1 % external cream  Apply topically 2 times daily. For up to 2 weeks then stop for a week and reapply as needed 30 g 3     No current facility-administered medications for this visit.       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 Rates pain 10/10 with pressure and stabbing sensation. Took ibuprofen and helped the pain. Currently pain is 5/10. Exam today with 3+ injection of the right eye. No corkscrew vessels but tender to palpation. Diffuse PEE but otherwise appropriate postop week 1. No leak at suture site. No corneal edema. No abrasions. No AC inflammation. Etiology of pain is scleritis vs dry eyes. Will obtain b scan today No blurry vision.  Family history of lupus.     07/15/24: reports improved pain to 0/10 today. States pain resolved the day after he started medrol dose pack. VIPUL resolved nasally today but now exposing engorged vessels compared to OS. Will defer scleritis work up for now and consider if second episode.     08/14/24 : reports stable vision. Exam is stable from prior. Unable to get Mrx today. Irregular astigmatism per pentacam on Meredith 10, 2024   2/19/205: stable examination, stable VA each eye; happy with CL OS    2/19/25 stable without changes. Pachy is decreased OD  R/B/A discussed with the patient including risk of RD, Bleeding, High IOP, inflammation. Patient agrees to proceed    # Keratoconus each eye  # History of right eye PKPx2 with failing graft/rejection  # S/p right eye attempted DMEK 3/29/23, aborted due to obstructed view from corneal edema (see OP note).  - right eye PKP 4/6/23, doing well, healing very well  S/p phaco IOL 6/27/24 with  Iris repair    No complications noted today    PLAN:  2/19/2025  - Continue pred forte daily  - f/up 6 months    Follow up: with cornea in 6 months      Ritchie Diaz MD  Cornea and External Disease Fellow  [facility]    Attending Physician Attestation: Complete documentation of historical and exam elements from today's encounter can be found in the full encounter summary report (not reduplicated in this progress note). I personally obtained the chief complaint(s) and history of present illness. I confirmed and edited as necessary the review of systems, past medical/surgical history, family history, social history, and examination findings as documented by others; and I examined the patient myself. I personally reviewed the relevant tests, images, and reports as documented above. I formulated and edited as necessary the assessment and plan and discussed the findings and management plan with the patient and family.  I was present for the entire procedure(s). - Jeremy Meza M.D

## 2025-02-26 ENCOUNTER — PATIENT OUTREACH (OUTPATIENT)
Dept: CARE COORDINATION | Facility: CLINIC | Age: 55
End: 2025-02-26
Payer: COMMERCIAL

## 2025-02-26 NOTE — PROGRESS NOTES
"Clinic Care Coordination Contact  Care Coordination Clinician Chart Review    Situation: Patient chart reviewed by Care Coordinator.       Background: Care Coordination Program started: 12/19/2024. Initial assessment completed and patient-centered care plan(s) were developed with participation from patient. Lead CC handed patient off to CHW for continued outreaches.       Assessment: Per chart review, patient outreach completed by CC CHW on 2/19/2025.  Per CHW note, discussion regarding care gaps was postponed as patient was driving.  Patient reported he is doing overall well. Patient reported his \"rent is paid\" when CHW asked if he reached out to [facility] for rental assistance or energy assistance. Patient stated he is going to call back to ask about energy assistance. CHW resending their number to patients MyChart along with additional food resources as patient stated it is difficult to make it to [employer] with his work schedule.     Patient is actively working to accomplish goal(s). Patient's goal(s) appropriate and relevant at this time. Patient is not due for updated Plan of Care.  Assessments will be completed annually or as needed/with change of patient status.      Care Plan: Financial Wellbeing       Problem: Patient expresses financial resource strain       Goal: Create an action plan to increase financial stability       Start Date: 12/30/2024 Expected End Date: 6/1/2025    This Visit's Progress: 50% Recent Progress: 40%    Priority: Medium    Note:     Barriers: Access   Strengths: Motivated   Patient expressed understanding of goal: Yes     Action steps to achieve this goal:  1. I will access [employer] and other food resources. It is difficult to make it to [employer] with my work schedule.  2. I will look into rental assistance options. I received rental assistance. I am going to reach back out to [facility] for energy assistance.  3. I will work with care coordination as needed.  "                                  Plan/Recommendations: The patient will continue working with Care Coordination to achieve goal(s) as above. CHW will continue outreaches at minimum every 30 days and will involve Lead CC as needed or if patient is ready to move to Maintenance. Lead CC will continue to monitor CHW outreaches and patient's progress to goal(s) every 6 weeks.     Plan of Care updated and sent to patient: No    BEATRICE Reyes, MSW   [facility]  Care Coordination  [facility]  [phone]  2/26/2025 8:12 AM

## 2025-02-27 ENCOUNTER — OFFICE VISIT (OUTPATIENT)
Dept: FAMILY MEDICINE | Facility: CLINIC | Age: 55
End: 2025-02-27
Attending: NURSE PRACTITIONER
Payer: COMMERCIAL

## 2025-02-27 VITALS
DIASTOLIC BLOOD PRESSURE: 78 MMHG | SYSTOLIC BLOOD PRESSURE: 148 MMHG | HEART RATE: 73 BPM | HEIGHT: 71 IN | BODY MASS INDEX: 44.1 KG/M2 | WEIGHT: 315 LBS | OXYGEN SATURATION: 96 % | TEMPERATURE: 97 F | RESPIRATION RATE: 18 BRPM

## 2025-02-27 DIAGNOSIS — E11.65 TYPE 2 DIABETES MELLITUS WITH HYPERGLYCEMIA, WITHOUT LONG-TERM CURRENT USE OF INSULIN (H): Primary | ICD-10-CM

## 2025-02-27 DIAGNOSIS — I1A.0 RESISTANT HYPERTENSION: ICD-10-CM

## 2025-02-27 PROBLEM — I10 ESSENTIAL HYPERTENSION: Status: RESOLVED | Noted: 2023-04-27 | Resolved: 2025-02-27

## 2025-02-27 LAB
EST. AVERAGE GLUCOSE BLD GHB EST-MCNC: 169 MG/DL
HBA1C MFR BLD: 7.5 % (ref 0–5.6)

## 2025-02-27 ASSESSMENT — PAIN SCALES - GENERAL: PAINLEVEL_OUTOF10: NO PAIN (0)

## 2025-02-27 NOTE — PROGRESS NOTES
Assessment & Plan     Type 2 diabetes mellitus with hyperglycemia, without long-term current use of insulin (H)  A1C ordered today for recheck.  Plan to follow-up in 6 months for recheck.  Set up patient for savings plan for his ozempic since currently this is over $200 out of pocket monthly on his insurance.  - PRIMARY CARE FOLLOW-UP SCHEDULING  - Hemoglobin A1c; Future  - Hemoglobin A1c    Resistant hypertension  Patient is on 3 different medications with allergies and issues on diuretics in the past.  I am referring to cardiology since he continues to have elevated BP.  - Adult Cardiology Eval Herb Referral; Future    See Patient Instructions    Amilcar Jiménez is a 54 year old, presenting for the following health issues:  Diabetes and Hypertension        2/27/2025    10:50 AM   Additional Questions   Roomed by rmb   Accompanied by self         2/27/2025    10:50 AM   Patient Reported Additional Medications   Patient reports taking the following new medications none     Via the Health Maintenance questionnaire, the patient has reported the following services have been completed -Eye Exam: [facility]  clinic 2024-10-18, this information has been sent to the abstraction team.  History of Present Illness       Diabetes:   He presents for follow up of diabetes.    He is not checking blood glucose.         He has no concerns regarding his diabetes at this time.   He is not experiencing numbness or burning in feet, excessive thirst, blurry vision, weight changes or redness, sores or blisters on feet. The patient has not had a diabetic eye exam in the last 12 months.             Hypertension Follow-up    Do you check your blood pressure regularly outside of the clinic? No   Are you following a low salt diet? Yes  Are your blood pressures ever more than 140 on the top number (systolic) OR more   than 90 on the bottom number (diastolic), for example 140/90? N/A  How many servings of fruits and vegetables do you eat  "daily?  2-3  On average, how many sweetened beverages do you drink each day (Examples: soda, juice, sweet tea, etc.  Do NOT count diet or artificially sweetened beverages)?   0  How many days per week do you exercise enough to make your heart beat faster? 3 or less  How many minutes a day do you exercise enough to make your heart beat faster? 9 or less  How many days per week do you miss taking your medication? 0  Diabetes Follow-up    How often are you checking your blood sugar? Not at all  What concerns do you have today about your diabetes? None   Do you have any of these symptoms? (Select all that apply)  No numbness or tingling in feet.  No redness, sores or blisters on feet.  No complaints of excessive thirst.  No reports of blurry vision.  No significant changes to weight.      BP Readings from Last 2 Encounters:   02/27/25 (!) 148/78   12/19/24 138/78     Hemoglobin A1C (%)   Date Value   12/19/2024 6.9 (H)   06/24/2024 7.2 (H)     LDL Cholesterol Calculated (mg/dL)   Date Value   08/22/2024 41   07/31/2023 50         Review of Systems  CONSTITUTIONAL: NEGATIVE for fever, chills, change in weight  RESP: NEGATIVE for significant cough or SOB  CV: NEGATIVE for chest pain, palpitations or peripheral edema  PSYCHIATRIC: NEGATIVE for changes in mood or affect  ROS otherwise negative      Objective    BP (!) 148/78   Pulse 73   Temp 97  F (36.1  C) (Tympanic)   Resp 18   Ht 1.803 m (5' 11\")   Wt (!) 147.9 kg (326 lb)   SpO2 96%   BMI 45.47 kg/m    Body mass index is 45.47 kg/m .  Physical Exam   GENERAL: alert and no distress  RESP: lungs clear to auscultation - no rales, rhonchi or wheezes  CV: regular rate and rhythm, normal S1 S2, no S3 or S4, no murmur, click or rub, no peripheral edema  PSYCH: mentation appears normal, affect normal/bright        Signed Electronically by: Thea Olvera NP    "

## 2025-03-15 DIAGNOSIS — I10 ESSENTIAL HYPERTENSION: ICD-10-CM

## 2025-03-17 RX ORDER — METOPROLOL SUCCINATE 100 MG/1
100 TABLET, EXTENDED RELEASE ORAL DAILY
Qty: 90 TABLET | Refills: 3 | Status: SHIPPED | OUTPATIENT
Start: 2025-03-17

## 2025-03-18 DIAGNOSIS — I10 ESSENTIAL HYPERTENSION: ICD-10-CM

## 2025-03-18 RX ORDER — AMLODIPINE BESYLATE 10 MG/1
10 TABLET ORAL DAILY
Qty: 90 TABLET | Refills: 1 | Status: SHIPPED | OUTPATIENT
Start: 2025-03-18

## 2025-03-27 ENCOUNTER — PATIENT OUTREACH (OUTPATIENT)
Dept: CARE COORDINATION | Facility: CLINIC | Age: 55
End: 2025-03-27
Payer: COMMERCIAL

## 2025-03-27 NOTE — LETTER
[facility] CARE COORDINATION  [address] [facility] [address]     March 27, 2025        Tino Gold  [address] [address] [address]          Dear Tino,     Feliz is an updated Patient Centered Plan of Care for your continued enrollment in Care Coordination. Please let us know if you have additional questions, concerns, or goals that we can assist with.    Sincerely,    Taylor Hamlin, SADIEW, MSW Clinic   [facility]  Care Coordination  [facility]   [email or web address]  Office: [phone]  Employed by [facility]  Patient Centered Plan of Care  About Me:        Patient Name:  Tino Gold    YOB: 1970  Age:         54 year old   Lisa MRN:    [id number] Telephone Information:  Home Phone [phone]   Mobile [phone]   Home Phone Not on file.       Address:  [address] [address] [address] Email address:  [email or web address]      Emergency Contact(s)    Name Relationship Lgl Grd Work Phone Home Phone Mobile Phone   1. VILLA GOLD Brother    [phone]   2. ILIANA OGLD Spouse    [phone]           Primary language:  English     needed? No   [place] [employer] [facility]:  [phone] op. 1  Other communication barriers:None    Preferred Method of Communication:   Kei  Current living arrangement: I live in a private home with spouse    Mobility Status/ Medical Equipment: Independent        Health Maintenance  Health Maintenance Reviewed: Due/Overdue   Health Maintenance Due   Topic Date Due    ZOSTER IMMUNIZATION (1 of 2) Never done          My Access Plan  Medical Emergency 911   Primary Clinic Line [facility] - [phone]   24 Hour Appointment Line [phone] or  [phone]) (toll-free)   24 Hour Nurse Line [phone] (toll-free)   Preferred  Urgent Care [facility], [phone]     Preferred Hospital [facility], [place]  [phone]     Preferred Pharmacy [facility] - [place], [place] - [address] Behavioral Health Crisis Line The National Suicide Prevention Lifeline at 1-981.617.2756 or Text/Call 398           My Care Team Members  Patient Care Team         Relationship Specialty Notifications Start End    Thea Olvera, NP PCP - General Family Medicine  5/4/23     Phone: [phone] Fax: [phone] [address] [facility] [address]    Mick Paul MD MD Ophthalmology  7/5/22     Phone: [phone] Fax: [phone]         [address] [place] [address]    Thea Olvera, NP Assigned PCP   4/6/23     Phone: [phone] Fax: [phone]         [facility] [address]    Dana Connolly RD Diabetes Educator Dietitian, Registered  5/30/23     Phone: [phone] Fax: [phone]         [place] [address] [place] [address]    Shala Weiss, CHW Community Health Worker  Admissions 12/19/24     Theodora Hughes LSW Lead Care Coordinator Primary Care - CC Admissions 12/20/24     Phone: [phone]          [facility] [address]    Jeremy Meza MD Assigned Surgical Provider   1/23/25     Phone: [phone] Fax: [phone]          [facility] [address]    Taylor Hamlin BSW Lead Care Coordinator Primary Care - CC Admissions 2/11/25     Phone: [phone] Fax: [phone]        Kyaw Abbott MD MD Cardiovascular Disease  3/3/25     Pager: [phone]                     My Care Plans  Self Management and Treatment Plan    Care Plan      Action Plans on File: none                      Advance Care Plans/Directives:   Advanced Care Plan/Directives on file: No    Discussed with patient/caregiver(s): Referral to Honoring  Choices             My Medical and Care Information  Problem List   Patient Active Problem List   Diagnosis    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    Care Coordination Start Date: 12/19/2024   Frequency of Care Coordination: 2 months, more frequently as needed     Form Last Updated: 03/27/2025

## 2025-03-27 NOTE — PROGRESS NOTES
Clinic Care Coordination Contact  Care Coordination Clinician Chart Review    Situation: Patient chart reviewed by Care Coordinator.       Background: Care Coordination Program started: 12/19/2024. Initial assessment completed and patient-centered care plan(s) were developed with participation from patient. Lead CC handed patient off to CHW for continued outreaches.       Assessment: Per chart review, patient outreach completed by CC CHW on 3/21/2025.    Patient reports he is doing well. [facility] and is waiting to hear back. Was told it can take 4-6 weeks to hear. CHW asked if he saw CHW's message in [employer] with more food resources and patient stated he must have missed it. CHW resent message. No other needs.     Patient is actively working to accomplish goal(s). Patient's goal(s) appropriate and relevant at this time. Patient is due for updated Plan of Care.  Assessments will be completed annually or as needed/with change of patient status.      Plan/Recommendations: Patient has no current care gaps.  Patient is ready to move to Maintenance. CHW will call in 2 months, SW will review 2 weeks after      Plan of Care updated and sent to patient: Yes, via [employer]    SADIE ReyesW, MSW   [facility]  Care Coordination  [facility]  [phone]  3/27/2025 7:04 AM

## 2025-04-30 ENCOUNTER — OFFICE VISIT (OUTPATIENT)
Dept: OPTOMETRY | Facility: CLINIC | Age: 55
End: 2025-04-30
Payer: COMMERCIAL

## 2025-04-30 DIAGNOSIS — H52.213 IRREGULAR ASTIGMATISM OF BOTH EYES: ICD-10-CM

## 2025-04-30 DIAGNOSIS — Z94.7 POST CORNEAL TRANSPLANT: ICD-10-CM

## 2025-04-30 DIAGNOSIS — H18.603 KERATOCONUS OF BOTH EYES: Primary | ICD-10-CM

## 2025-04-30 DIAGNOSIS — H52.32 ANISOMETROPIA AND ANISEIKONIA: ICD-10-CM

## 2025-04-30 DIAGNOSIS — H52.31 ANISOMETROPIA AND ANISEIKONIA: ICD-10-CM

## 2025-04-30 ASSESSMENT — VISUAL ACUITY
OS_CC+: -1
OD_CC: 20/50
OS_PH_CC: 20/30
METHOD: SNELLEN - LINEAR
OS_CC: 20/50
OS_PH_CC+: -1
CORRECTION_TYPE: CONTACTS
OD_CC+: +1

## 2025-04-30 ASSESSMENT — REFRACTION_CURRENTRX
OS_CYLINDER: -1.50
OD_ADDL_SPECS: BOSTON XO2 CLEAR
OS_AXIS: 180
OS_DIAMETER: 17.0
OS_SPHERE: -17.75
OD_SPHERE: +1.25
OD_CYLINDER: -0.75
OD_DIAMETER: 17.0
OD_AXIS: 180
OS_ADDL_SPECS: BOSTON XO BLUE

## 2025-04-30 ASSESSMENT — REFRACTION_MANIFEST
OD_SPHERE: +0.75
OS_AXIS: 015
OS_SPHERE: +1.00
OD_CYLINDER: SPHERE
OS_CYLINDER: +0.50

## 2025-04-30 ASSESSMENT — SLIT LAMP EXAM - LIDS
COMMENTS: NORMAL
COMMENTS: NORMAL

## 2025-04-30 ASSESSMENT — EXTERNAL EXAM - LEFT EYE: OS_EXAM: NORMAL

## 2025-04-30 ASSESSMENT — PACHYMETRY: OD_CT(UM): 689

## 2025-04-30 ASSESSMENT — TONOMETRY
IOP_METHOD: ICARE
OD_IOP_MMHG: 10
OS_IOP_MMHG: 06

## 2025-04-30 ASSESSMENT — EXTERNAL EXAM - RIGHT EYE: OD_EXAM: NORMAL

## 2025-04-30 NOTE — PROGRESS NOTES
A/P  1.) Keratoconus OU, s/p PKP right eye  -Previously in scleral lens OU fit in [place]    Right eye:  -s/p PKP 04/2023. On Pred bid OD  -s/p CE/IOL 06/2024  -Generally doing well with scleral lens. Can improve centration by adjusting haptics. Tilted graft, less superior clearance but no epitheliopathy  -Plus OR to improve overall vision  -Pachy fairly stable (689). No rasheed edema. Monitor. Already in high Dk material    Left eye:  -High/degnerative myopia and DONNIE limiting acuity. BCVA approx 20/50 range.   -Also improved with plus OR  -Lens fitting well    Order new PAIR and mail direct. Follow-up 1 year here, sooner prn    I have confirmed the patient's CC, HPI and reviewed Past Medical History, Past Surgical History, Social History, Family History, Problem List, Medication List and agree with Tech note.     Ana Simón, OD FAAO FSLS    Contact Lens Billing  V-Code:  - GP scleral  Final Contact Lens Rx         Brand Base Curve Diameter Sphere Cylinder Axis Lens Addl. Specs    Right Zenlens Prolate 5.5/7.8 17.0 +2.00 -0.75 180 2 flat H x 4 steep V [place] XO2 clear    Left Zenlens Prolate 5.5sag, 6.5bc 17.0 -16.75 -1.00 180 -75um LC, 2 flat H x 1 steep V [place] XO blue           # of units: 2  Price per Unit: $235    This patient requires contact lenses that are medically necessary for either improvement in vision over spectacles, support of the ocular surface, or other therapeutic benefit. These are not cosmetic contact lenses.     Encounter Diagnoses   Name Primary?    Keratoconus of both eyes Yes    Post corneal transplant     Irregular astigmatism of both eyes     Anisometropia and aniseikonia

## 2025-04-30 NOTE — NURSING NOTE
Chief Complaints and History of Present Illnesses   Patient presents with    Contact Lens Follow Up     Pt here for scleral lens follow up.     Chief Complaint(s) and History of Present Illness(es)       Contact Lens Follow Up              Laterality: both eyes    Comments: Pt here for scleral lens follow up.              Comments    Pt has largely unchanged vision since last exam. No concerns with lenses.     Joceline Lerma, COT on 4/30/2025 at 7:41 AM

## 2025-05-13 NOTE — PROGRESS NOTES
CARDIOLOGY CONSULT    REASON FOR CONSULT: hypertension    PRIMARY CARE PHYSICIAN:  Thea Olvera    HISTORY OF PRESENT ILLNESS:  54-year-old male seen for hypertension.  He has diabetes type 2, gout.    He has had hypertension for many years.  It has been intermittently controlled over the years.  Currently he is on 3 drug regimen, blood pressure is not checked at home.    He does some light to moderate activity.  He works in [employer] and does a lot of lifting and movement with work, but really no dedicated exercise.  He has been on Ozempic and has lost about 10 pounds.  He denies any significant shortness of breath, chest pain, or lower extremity edema.    PAST MEDICAL HISTORY:  Past Medical History:   Diagnosis Date    Diabetes mellitus, type 2 (H)     HTN (hypertension)     Hyperlipidemia LDL goal <70     Stable keratoconus of both eyes        MEDICATIONS:  Current Outpatient Medications   Medication Sig Dispense Refill    alcohol swab prep pads Use to swab area of injection/finn as directed. 100 each 3    amLODIPine (NORVASC) 10 MG tablet Take 1 tablet by mouth once daily 90 tablet 1    atorvastatin (LIPITOR) 40 MG tablet Take 1 tablet (40 mg) by mouth daily. 90 tablet 3    blood glucose (NO BRAND SPECIFIED) test strip Use to test blood sugar 3-4 times daily or as directed. To accompany: Blood Glucose Monitor Brands: per insurance. 100 strip 6    blood glucose monitoring (NO BRAND SPECIFIED) meter device kit Use to test blood sugar 3-4 times daily or as directed. Preferred blood glucose meter OR supplies to accompany: Blood Glucose Monitor Brands: per insurance. 1 kit 0    erythromycin (ROMYCIN) 5 MG/GM ophthalmic ointment Place 0.5 inches into the right eye 2 times daily      fluticasone (FLONASE) 50 MCG/ACT nasal spray as needed      lisinopril (ZESTRIL) 40 MG tablet Take 1 tablet (40 mg) by mouth daily. 90 tablet 3    metFORMIN (GLUCOPHAGE XR) 500 MG 24 hr tablet Take 1 tablet (500 mg) by mouth 2  times daily (with meals). 180 tablet 1    metoprolol succinate ER (TOPROL XL) 100 MG 24 hr tablet Take 1 tablet (100 mg) by mouth daily. 90 tablet 3    moxifloxacin (VIGAMOX) 0.5 % ophthalmic solution Apply 1 drop to eye 4 times daily In operative eye 3 mL 1    ofloxacin (OCUFLOX) 0.3 % ophthalmic solution Place 1 drop into the right eye every 2 hours (while awake) 10 mL 11    prednisoLONE acetate (PRED FORTE) 1 % ophthalmic suspension Place 1-2 drops into the right eye 6 times daily 10 mL 11    prednisoLONE acetate (PRED FORTE) 1 % ophthalmic suspension Place 1 drop into the right eye 3 times daily 5 mL 0    Semaglutide, 2 MG/DOSE, (OZEMPIC, 2 MG/DOSE,) 8 MG/3ML pen Inject 2 mg subcutaneously every 7 days. 9 mL 1    sodium chloride 0.9 % neb solution 3 ml twice daily for scleral lens use--medially necessary for scleral lens use. 300 mL 0    tadalafil (CIALIS) 2.5 MG tablet Take 4 tablets (10 mg) by mouth daily as needed (sexual intercourse). 20 tablet 3    thin (NO BRAND SPECIFIED) lancets Use to test blood sugar 3-4 times daily or as directed. To accompany: Blood Glucose Monitor Brands: per insurance. 100 each 6    triamcinolone (KENALOG) 0.1 % external cream Apply topically 2 times daily. For up to 2 weeks then stop for a week and reapply as needed 30 g 3     No current facility-administered medications for this visit.       ALLERGIES:  Allergies   Allergen Reactions    Hydrochlorothiazide      Gout flares    Hydrocodone-Acetaminophen Hives and Swelling    Naproxen GI Disturbance     Other reaction(s): GI Upset       SOCIAL HISTORY:  I have reviewed this patient's social history and updated it with pertinent information if needed. Tino Gleason  reports that he has never smoked. He has never used smokeless tobacco. He reports current alcohol use of about 10.0 standard drinks of alcohol per week. He reports that he does not use drugs.    FAMILY HISTORY:  I have reviewed this patient's family history and updated  it with pertinent information if needed.   Family History   Problem Relation Age of Onset    Hypertension Mother     Hyperlipidemia Mother     Hypertension Brother     Hypertension Brother     Heart Failure Maternal Grandmother     Diabetes Maternal Grandfather     Liver Cancer Paternal Grandmother     Glaucoma No family hx of     Macular Degeneration No family hx of        REVIEW OF SYSTEMS:  Constitutional:  No weight loss, fever, chills  HEENT:  Eyes:  No visual loss, blurred vision, double vision or yellow sclerae. No hearing loss, sneezing, congestion, runny nose or sore throat.  Skin:  No rash or itching.  Cardiovascular: per HPI  Respiratory: per HPI  GI:  No anorexia, nausea, vomiting or diarrhea. No abdominal pain or blood.  :  No dysurea, hematuria  Neurologic:  No headache, paralysis, ataxia, numbness or tingling in the extremities. No change in bowel or bladder control.  Musculoskeletal:  No muscle pain  Hematologic:  No bleeding or bruising.  Lymphatics:  No enlarged nodes. No history of splenectomy.  Endocrine:  No reports of sweating, cold or heat intolerance. No polyuria or polydipsia.  Allergies:  No history of asthma, hives, eczema or rhinitis.    PHYSICAL EXAM:  BP (!) 151/88 (BP Location: Right arm, Patient Position: Sitting)   Pulse 67   Resp 16   Wt (!) 147.4 kg (325 lb)   SpO2 97%   BMI 45.33 kg/m    Constitutional: awake, alert, no distress  Eyes: PERRL, sclera nonicteric  ENT: trachea midline  Respiratory: lungs clear  Cardiovascular: RRR, no murmurs  GI: nondistended, nontender, bowel sounds present  Lymph/Hematologic: no lymphadenopathy  Skin: dry, no rash  Musculoskeletal: good muscle tone, strength 5/5 in upper and lower extremities  Neurologic: no focal deficits  Neuropsychiatric: appropriate affact    DATA:  Labs:   December 2024: Potassium 4.3, creatinine 1.1  Recent Labs   Lab Test 08/22/24  0836 07/31/23  1352   CHOL 88 107   HDL 31* 36*   LDL 41 50   TRIG 80 106     EKG: May  20: Sinus rhythm, rate 67, normal EKG    ASSESSMENT:  54-year-old male seen for hypertension.  Blood pressure is a little high in clinic, it is not checked at home.  He is currently on 3 drug regimen.  This is likely primary hypertension.  His obesity is definitely factoring in, this is probably not technically resistant.  He is willing to start spironolactone, the risk and benefit of the medication was explained in detail.    For optimal blood pressure control he will need some lifestyle modification, mainly calorie restriction and weight loss.  We talked about some strategies to reduce calorie intake.  Unless weight improves, it may be very hard to optimally control blood pressure with medications alone.    Echo will be done to assess for any hypertensive heart disease findings.    RECOMMENDATIONS:  1.  Hypertension  - Encouraged more frequent home blood checks  - Start spironolactone 25 mg once daily, check BMP in 1 week  - In the future could change metoprolol to carvedilol  - Echo    Follow-up in 2 months with DAISY.    Kyaw Abbott MD  Cardiology - [facility]  Pager:  [phone]  Text Page  May 20, 2025

## 2025-05-19 ENCOUNTER — PATIENT OUTREACH (OUTPATIENT)
Dept: CARE COORDINATION | Facility: CLINIC | Age: 55
End: 2025-05-19
Payer: COMMERCIAL

## 2025-05-19 NOTE — PROGRESS NOTES
Pt to remain on maintenance status for one additional outreach per CHW discussion.     Theodora Hughes, BEATRICE   Social Work Primary Care Clinic Care Coordinator   [facility]  [phone]  [email or web address]

## 2025-05-19 NOTE — PROGRESS NOTES
Clinic Care Coordination Contact  Community Health Worker Follow Up    Care Gaps:     Health Maintenance Due   Topic Date Due    ZOSTER IMMUNIZATION (1 of 2) Never done    A1C  05/27/2025       Plans to complete A1C    Care Plan:   Care Plan: Financial Wellbeing Completed 3/27/2025      Problem: Patient expresses financial resource strain  Resolved 3/27/2025      Goal: Create an action plan to increase financial stability  Completed 3/27/2025      Start Date: 12/30/2024 Expected End Date: 6/1/2025    Recent Progress: 60%    Priority: Medium    Note:     Barriers: Access   Strengths: Motivated   Patient expressed understanding of goal: Yes     Action steps to achieve this goal:  1. I will access [employer] and other food resources. It is difficult to make it to [employer] with my work schedule. My CHW sent me additional food resources/food shelves in [facility] on 3/21/25. I will attend as needed.  2. I will look into rental assistance options. I received rental assistance. I am going to reach back out to [facility] for energy assistance. I applied to energy assistance and am waiting to hear back.  3. I will work with care coordination as needed.                                Intervention and Education during outreach: Patient was approved for Energy Assistance and reports he has no further needs from [facility] at this time. Agreed with maintenance status.    CHW Plan: Send chart to CC AZEB to review for maintenance.     Patient has completed all goals with Clinic Care Coordination.  Please review the chart and confirm if maintenance  is approved.    ANGEL Pepe  [phone]  [facility]

## 2025-05-20 ENCOUNTER — RESULTS FOLLOW-UP (OUTPATIENT)
Dept: CARDIOLOGY | Facility: CLINIC | Age: 55
End: 2025-05-20

## 2025-05-20 ENCOUNTER — OFFICE VISIT (OUTPATIENT)
Dept: CARDIOLOGY | Facility: CLINIC | Age: 55
End: 2025-05-20
Payer: COMMERCIAL

## 2025-05-20 VITALS
WEIGHT: 315 LBS | SYSTOLIC BLOOD PRESSURE: 151 MMHG | OXYGEN SATURATION: 97 % | HEART RATE: 67 BPM | BODY MASS INDEX: 45.33 KG/M2 | DIASTOLIC BLOOD PRESSURE: 88 MMHG | RESPIRATION RATE: 16 BRPM

## 2025-05-20 DIAGNOSIS — I1A.0 RESISTANT HYPERTENSION: Primary | ICD-10-CM

## 2025-05-20 PROCEDURE — 3077F SYST BP >= 140 MM HG: CPT | Performed by: INTERNAL MEDICINE

## 2025-05-20 PROCEDURE — 3079F DIAST BP 80-89 MM HG: CPT | Performed by: INTERNAL MEDICINE

## 2025-05-20 PROCEDURE — 99204 OFFICE O/P NEW MOD 45 MIN: CPT | Mod: 24 | Performed by: INTERNAL MEDICINE

## 2025-05-20 PROCEDURE — 93000 ELECTROCARDIOGRAM COMPLETE: CPT | Performed by: INTERNAL MEDICINE

## 2025-05-20 RX ORDER — SPIRONOLACTONE 25 MG/1
25 TABLET ORAL DAILY
Qty: 30 TABLET | Refills: 3 | Status: SHIPPED | OUTPATIENT
Start: 2025-05-20

## 2025-05-20 NOTE — LETTER
5/20/2025    Thea Olvera, NP  [address] [facility] [address]    RE: Tino Gleason       Dear Colleague,     I had the pleasure of seeing Tino Gleason in the [facility].  CARDIOLOGY CONSULT    REASON FOR CONSULT: hypertension    PRIMARY CARE PHYSICIAN:  Thea Olvera    HISTORY OF PRESENT ILLNESS:  54-year-old male seen for hypertension.  He has diabetes type 2, gout.    He has had hypertension for many years.  It has been intermittently controlled over the years.  Currently he is on 3 drug regimen, blood pressure is not checked at home.    He does some light to moderate activity.  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  He has been on Ozempic and has lost about 10 pounds.  He denies any significant shortness of breath, chest pain, or lower extremity edema.    PAST MEDICAL HISTORY:  Past Medical History:   Diagnosis Date     Diabetes mellitus, type 2 (H)      HTN (hypertension)      Hyperlipidemia LDL goal <70      Stable keratoconus of both eyes        MEDICATIONS:  Current Outpatient Medications   Medication Sig Dispense Refill     alcohol swab prep pads Use to swab area of injection/finn as directed. 100 each 3     amLODIPine (NORVASC) 10 MG tablet Take 1 tablet by mouth once daily 90 tablet 1     atorvastatin (LIPITOR) 40 MG tablet Take 1 tablet (40 mg) by mouth daily. 90 tablet 3     blood glucose (NO BRAND SPECIFIED) test strip Use to test blood sugar 3-4 times daily or as directed. To accompany: Blood Glucose Monitor Brands: per insurance. 100 strip 6     blood glucose monitoring (NO BRAND SPECIFIED) meter device kit Use to test blood sugar 3-4 times daily or as directed. Preferred blood glucose meter OR supplies to accompany: Blood Glucose Monitor Brands: per insurance. 1 kit 0     erythromycin (ROMYCIN) 5 MG/GM ophthalmic ointment Place 0.5 inches into the right eye 2 times daily       fluticasone  (FLONASE) 50 MCG/ACT nasal spray as needed       lisinopril (ZESTRIL) 40 MG tablet Take 1 tablet (40 mg) by mouth daily. 90 tablet 3     metFORMIN (GLUCOPHAGE XR) 500 MG 24 hr tablet Take 1 tablet (500 mg) by mouth 2 times daily (with meals). 180 tablet 1     metoprolol succinate ER (TOPROL XL) 100 MG 24 hr tablet Take 1 tablet (100 mg) by mouth daily. 90 tablet 3     moxifloxacin (VIGAMOX) 0.5 % ophthalmic solution Apply 1 drop to eye 4 times daily In operative eye 3 mL 1     ofloxacin (OCUFLOX) 0.3 % ophthalmic solution Place 1 drop into the right eye every 2 hours (while awake) 10 mL 11     prednisoLONE acetate (PRED FORTE) 1 % ophthalmic suspension Place 1-2 drops into the right eye 6 times daily 10 mL 11     prednisoLONE acetate (PRED FORTE) 1 % ophthalmic suspension Place 1 drop into the right eye 3 times daily 5 mL 0     Semaglutide, 2 MG/DOSE, (OZEMPIC, 2 MG/DOSE,) 8 MG/3ML pen Inject 2 mg subcutaneously every 7 days. 9 mL 1     sodium chloride 0.9 % neb solution 3 ml twice daily for scleral lens use--medially necessary for scleral lens use. 300 mL 0     tadalafil (CIALIS) 2.5 MG tablet Take 4 tablets (10 mg) by mouth daily as needed (sexual intercourse). 20 tablet 3     thin (NO BRAND SPECIFIED) lancets Use to test blood sugar 3-4 times daily or as directed. To accompany: Blood Glucose Monitor Brands: per insurance. 100 each 6     triamcinolone (KENALOG) 0.1 % external cream Apply topically 2 times daily. For up to 2 weeks then stop for a week and reapply as needed 30 g 3     No current facility-administered medications for this visit.       ALLERGIES:  Allergies   Allergen Reactions     Hydrochlorothiazide      Gout flares     Hydrocodone-Acetaminophen Hives and Swelling     Naproxen GI Disturbance     Other reaction(s): GI Upset       SOCIAL HISTORY:  I have reviewed this patient's social history and updated it with pertinent information if needed. Tino Gleason  reports that he has never smoked. He has  never used smokeless tobacco. He reports current alcohol use of about 10.0 standard drinks of alcohol per week. He reports that he does not use drugs.    FAMILY HISTORY:  I have reviewed this patient's family history and updated it with pertinent information if needed.   Family History   Problem Relation Age of Onset     Hypertension Mother      Hyperlipidemia Mother      Hypertension Brother      Hypertension Brother      Heart Failure Maternal Grandmother      Diabetes Maternal Grandfather      Liver Cancer Paternal Grandmother      Glaucoma No family hx of      Macular Degeneration No family hx of        REVIEW OF SYSTEMS:  Constitutional:  No weight loss, fever, chills  HEENT:  Eyes:  No visual loss, blurred vision, double vision or yellow sclerae. No hearing loss, sneezing, congestion, runny nose or sore throat.  Skin:  No rash or itching.  Cardiovascular: per HPI  Respiratory: per HPI  GI:  No anorexia, nausea, vomiting or diarrhea. No abdominal pain or blood.  :  No dysurea, hematuria  Neurologic:  No headache, paralysis, ataxia, numbness or tingling in the extremities. No change in bowel or bladder control.  Musculoskeletal:  No muscle pain  Hematologic:  No bleeding or bruising.  Lymphatics:  No enlarged nodes. No history of splenectomy.  Endocrine:  No reports of sweating, cold or heat intolerance. No polyuria or polydipsia.  Allergies:  No history of asthma, hives, eczema or rhinitis.    PHYSICAL EXAM:  BP (!) 151/88 (BP Location: Right arm, Patient Position: Sitting)   Pulse 67   Resp 16   Wt (!) 147.4 kg (325 lb)   SpO2 97%   BMI 45.33 kg/m    Constitutional: awake, alert, no distress  Eyes: PERRL, sclera nonicteric  ENT: trachea midline  Respiratory: lungs clear  Cardiovascular: RRR, no murmurs  GI: nondistended, nontender, bowel sounds present  Lymph/Hematologic: no lymphadenopathy  Skin: dry, no rash  Musculoskeletal: good muscle tone, strength 5/5 in upper and lower extremities  Neurologic:  no focal deficits  Neuropsychiatric: appropriate affact    DATA:  Labs:   December 2024: Potassium 4.3, creatinine 1.1  Recent Labs   Lab Test 08/22/24  0836 07/31/23  1352   CHOL 88 107   HDL 31* 36*   LDL 41 50   TRIG 80 106     EKG: May 20: Sinus rhythm, rate 67, normal EKG    ASSESSMENT:  54-year-old male seen for hypertension.  Blood pressure is a little high in clinic, it is not checked at home.  He is currently on 3 drug regimen.  This is likely primary hypertension.  His obesity is definitely factoring in, this is probably not technically resistant.  He is willing to start spironolactone, the risk and benefit of the medication was explained in detail.    For optimal blood pressure control he will need some lifestyle modification, mainly calorie restriction and weight loss.  We talked about some strategies to reduce calorie intake.  Unless weight improves, it may be very hard to optimally control blood pressure with medications alone.    Echo will be done to assess for any hypertensive heart disease findings.    RECOMMENDATIONS:  1.  Hypertension  - Encouraged more frequent home blood checks  - Start spironolactone 25 mg once daily, check BMP in 1 week  - In the future could change metoprolol to carvedilol  - Echo    Follow-up in 2 months with DAISY.    Kyaw Abbott MD  Cardiology - [facility]  Pager:  [phone]  Text Page  May 20, 2025        Thank you for allowing me to participate in the care of your patient.      Sincerely,     Kyaw Abbott MD     [facility]  cc:   Thea Olvera, NP  [address] [place] [address]

## 2025-05-20 NOTE — PATIENT INSTRUCTIONS
Start spironolactone 25mg once daily.    Check labs one week after starting medication.    Check home blood pressure, goal is 130/80 or less.    Echocardiogram  Will schedule an echocardiogram, or ultrasound of the heart.  This looks at the size and function of the heart and valves.  It generally takes about 20-30 minutes.  This will tell if there is any reduced heart function or problem with any of the valves.

## 2025-06-12 ENCOUNTER — TELEPHONE (OUTPATIENT)
Dept: FAMILY MEDICINE | Facility: CLINIC | Age: 55
End: 2025-06-12
Payer: COMMERCIAL

## 2025-06-12 NOTE — TELEPHONE ENCOUNTER
Patient Quality Outreach    Patient is due for the following:   Hypertension -  BP check    Action(s) Taken:   Left message     Type of outreach:    Phone, left message for patient/parent to call back.    Questions for provider review:    None         Grady Vizcarra  Chart routed to None.

## 2025-06-14 ENCOUNTER — HEALTH MAINTENANCE LETTER (OUTPATIENT)
Age: 55
End: 2025-06-14

## 2025-06-18 ENCOUNTER — HOSPITAL ENCOUNTER (OUTPATIENT)
Dept: CARDIOLOGY | Facility: CLINIC | Age: 55
Discharge: HOME | End: 2025-06-18
Attending: INTERNAL MEDICINE
Payer: COMMERCIAL

## 2025-06-18 ENCOUNTER — LAB (OUTPATIENT)
Dept: LAB | Facility: CLINIC | Age: 55
End: 2025-06-18
Payer: COMMERCIAL

## 2025-06-18 DIAGNOSIS — I1A.0 RESISTANT HYPERTENSION: ICD-10-CM

## 2025-06-18 LAB
ANION GAP SERPL CALCULATED.3IONS-SCNC: 10 MMOL/L (ref 7–15)
BUN SERPL-MCNC: 13.8 MG/DL (ref 6–20)
CALCIUM SERPL-MCNC: 9.5 MG/DL (ref 8.8–10.4)
CHLORIDE SERPL-SCNC: 104 MMOL/L (ref 98–107)
CREAT SERPL-MCNC: 1.15 MG/DL (ref 0.67–1.17)
EGFRCR SERPLBLD CKD-EPI 2021: 76 ML/MIN/1.73M2
GLUCOSE SERPL-MCNC: 128 MG/DL (ref 70–99)
HCO3 SERPL-SCNC: 26 MMOL/L (ref 22–29)
LVEF ECHO: NORMAL
POTASSIUM SERPL-SCNC: 3.8 MMOL/L (ref 3.4–5.3)
SODIUM SERPL-SCNC: 140 MMOL/L (ref 135–145)

## 2025-06-18 PROCEDURE — 93306 TTE W/DOPPLER COMPLETE: CPT

## 2025-06-18 PROCEDURE — 93306 TTE W/DOPPLER COMPLETE: CPT | Mod: 26 | Performed by: INTERNAL MEDICINE

## 2025-06-18 NOTE — RESULT ENCOUNTER NOTE
EF 60-65%; mod to severe LVH; no valve disease. Follow up with Lori Gates NP on 8/5/25. Will discuss with Dr Abbott for recommendations prior to visit.

## 2025-07-04 DIAGNOSIS — E11.65 TYPE 2 DIABETES MELLITUS WITH HYPERGLYCEMIA, WITHOUT LONG-TERM CURRENT USE OF INSULIN (H): ICD-10-CM

## 2025-07-07 RX ORDER — SEMAGLUTIDE 2.68 MG/ML
INJECTION, SOLUTION SUBCUTANEOUS
Qty: 3 ML | Refills: 1 | Status: SHIPPED | OUTPATIENT
Start: 2025-07-07

## 2025-07-07 NOTE — TELEPHONE ENCOUNTER
GFR Estimate   Date Value Ref Range Status   06/18/2025 76 >60 mL/min/1.73m2 Final     Comment:     eGFR calculated using 2021 CKD-EPI equation.

## 2025-07-08 ENCOUNTER — NURSE TRIAGE (OUTPATIENT)
Dept: OPHTHALMOLOGY | Facility: CLINIC | Age: 55
End: 2025-07-08
Payer: COMMERCIAL

## 2025-07-08 NOTE — TELEPHONE ENCOUNTER
I spoke to the patient who notes he has had Right eye haze for one to two weeks.  He is scheduled for tomorrow

## 2025-07-08 NOTE — TELEPHONE ENCOUNTER
"Nurse Triage SBAR    Is this a 2nd Level Triage? YES, LICENSED PRACTITIONER REVIEW IS REQUIRED    Situation: Patient noting gradual onset of white haze right eye over past two weeks. No trauma, no pain, no diplopia, no eye discharge.  States this was gradual, would generally clear after gtts instilled but now white haze persists all day. With contact lens in, he can read but needs to get closer to print.Denies curtains/ flashers/ field cut/ redness.    He is using Prednisolone gtts daily to RIGHT eye   Last seen by Dr Meza 2/19/25  Background: S/p CEIOL with iridoplasty and PK suture removal right eye 6/27/24     # Keratoconus each eye  # History of right eye PKPx2 with failing graft/rejection  # S/p right eye attempted DMEK 3/29/23, aborted due to obstructed view from corneal edema (see OP note).  Assessment:    Vision change, similar to prior to corneal transplant ~ 10 years ago. Gradual onset over two weeks  Protocol Recommended Disposition:   See in clinic ASAP    Routed to providerteam       Reason for Disposition   Blurred vision or visual changes and present now and sudden onset or new (e.g., minutes, hours, days)  (Exception: Seeing floaters / black specks OR previously diagnosed migraine headaches with same symptoms.)    Additional Information   Negative: Weakness of the face, arm or leg on one side of the body   Negative: Followed getting substance in the eye   Negative: Foreign body stuck in the eye   Negative: Followed an eye injury   Negative: Followed sun lamp or sun exposure (UV keratitis)   Negative: Yellow or green discharge (pus) in the eye   Negative: Pregnant   Negative: Complete loss of vision in one or both eyes   Negative: Severe eye pain   Negative: Severe headache   Negative: Double vision    Answer Assessment - Initial Assessment Questions  1. DESCRIPTION: \"How has your vision changed?\" (e.g., complete vision loss, blurred vision, double vision, floaters, etc.)      RIGHT blurry  2. " "LOCATION: \"One or both eyes?\" If one, ask: \"Which eye?\"      Two weeks- really blurry white haze  3. SEVERITY: \"Can you see anything?\" If Yes, ask: \"What can you see?\" (e.g., fine print)       He can read with contact - must get closer  4. ONSET: \"When did this begin?\" \"Did it start suddenly or has this been gradual?\"      Gradual, usually would clear in AM, now its hazy all day  5. PATTERN: \"Does this come and go, or has it been constant since it started?\"    Gradual  6. PAIN: \"Is there any pain in your eye(s)?\"  (Scale 1-10; or mild, moderate, severe)      No pain  7. CONTACTS-GLASSES: \"Do you wear contacts or glasses?\"      contacts  8. CAUSE: \"What do you think is causing this visual problem?\"      Happened years ago prior to 1st corneal transplantNo  9. OTHER SYMPTOMS: \"Do you have any other symptoms?\" (e.g., confusion, headache, arm or leg weakness, speech problems)      No    Protocols used: Vision Loss or Change-A-OH    "

## 2025-07-08 NOTE — TELEPHONE ENCOUNTER
I spoke to the patient who notes he has had Right eye haze for one to two weeks.  He is scheduled for tomorrow.

## 2025-07-08 NOTE — TELEPHONE ENCOUNTER
Caller reporting the following red-flag symptom(s): Pt reports that his Rt eye has been blurry for 1-2 weeks and is not improving. He did have a cornea transplant in this eye last year.    Per the system red-flag symptom policy, patient was instructed to:  speak with a Registered Nurse    Action:  Patient warm transferred to a Registered Nurse

## 2025-07-09 ENCOUNTER — OFFICE VISIT (OUTPATIENT)
Dept: OPHTHALMOLOGY | Facility: CLINIC | Age: 55
End: 2025-07-09
Attending: OPHTHALMOLOGY
Payer: COMMERCIAL

## 2025-07-09 DIAGNOSIS — T86.8409 REJECTION OF CORNEAL GRAFT: Primary | ICD-10-CM

## 2025-07-09 DIAGNOSIS — H43.393 VITREOUS SYNERESIS OF BOTH EYES: ICD-10-CM

## 2025-07-09 DIAGNOSIS — Z98.890 POSTOPERATIVE EYE STATE: ICD-10-CM

## 2025-07-09 PROCEDURE — 76514 ECHO EXAM OF EYE THICKNESS: CPT | Performed by: OPHTHALMOLOGY

## 2025-07-09 PROCEDURE — 68200 TREAT EYELID BY INJECTION: CPT | Mod: RT | Performed by: OPHTHALMOLOGY

## 2025-07-09 PROCEDURE — 92134 CPTRZ OPH DX IMG PST SGM RTA: CPT | Mod: 26 | Performed by: OPHTHALMOLOGY

## 2025-07-09 PROCEDURE — 92134 CPTRZ OPH DX IMG PST SGM RTA: CPT | Performed by: OPHTHALMOLOGY

## 2025-07-09 PROCEDURE — 99214 OFFICE O/P EST MOD 30 MIN: CPT | Mod: 25 | Performed by: OPHTHALMOLOGY

## 2025-07-09 PROCEDURE — 99211 OFF/OP EST MAY X REQ PHY/QHP: CPT | Performed by: OPHTHALMOLOGY

## 2025-07-09 PROCEDURE — 250N000011 HC RX IP 250 OP 636: Performed by: OPHTHALMOLOGY

## 2025-07-09 RX ORDER — DEXAMETHASONE SODIUM PHOSPHATE 4 MG/ML
0.1 INJECTION, SOLUTION INTRA-ARTICULAR; INTRALESIONAL; INTRAMUSCULAR; INTRAVENOUS; SOFT TISSUE ONCE
Status: COMPLETED | OUTPATIENT
Start: 2025-07-09 | End: 2025-07-09

## 2025-07-09 RX ADMIN — DEXAMETHASONE SODIUM PHOSPHATE 4 MG: 4 INJECTION, SOLUTION INTRA-ARTICULAR; INTRALESIONAL; INTRAMUSCULAR; INTRAVENOUS; SOFT TISSUE at 12:28

## 2025-07-09 ASSESSMENT — VISUAL ACUITY
OD_SC: 20/150
OS_PH_CC+: -1
OS_CC: 20/50
OS_PH_CC: 20/25
OD_PH_SC: 20/80
METHOD: SNELLEN - LINEAR
CORRECTION_TYPE: CONTACTS
OS_CC+: +1
OD_PH_SC+: -1

## 2025-07-09 ASSESSMENT — REFRACTION_WEARINGRX
OS_AXIS: 035
OS_CYLINDER: +2.75
OS_SPHERE: -17.50
SPECS_TYPE: SCLERAL LENS

## 2025-07-09 ASSESSMENT — CONF VISUAL FIELD
OS_INFERIOR_TEMPORAL_RESTRICTION: 0
OS_INFERIOR_NASAL_RESTRICTION: 0
METHOD: COUNTING FINGERS
OD_INFERIOR_TEMPORAL_RESTRICTION: 0
OD_SUPERIOR_NASAL_RESTRICTION: 0
OD_SUPERIOR_TEMPORAL_RESTRICTION: 0
OS_NORMAL: 1
OS_SUPERIOR_NASAL_RESTRICTION: 0
OD_NORMAL: 1
OD_INFERIOR_NASAL_RESTRICTION: 0
OS_SUPERIOR_TEMPORAL_RESTRICTION: 0

## 2025-07-09 ASSESSMENT — SLIT LAMP EXAM - LIDS
COMMENTS: NORMAL
COMMENTS: NORMAL

## 2025-07-09 ASSESSMENT — PACHYMETRY: OD_CT(UM): 739

## 2025-07-09 ASSESSMENT — TONOMETRY
IOP_METHOD: ICARE
OD_IOP_MMHG: 7

## 2025-07-09 ASSESSMENT — EXTERNAL EXAM - RIGHT EYE: OD_EXAM: NORMAL

## 2025-07-09 ASSESSMENT — EXTERNAL EXAM - LEFT EYE: OS_EXAM: NORMAL

## 2025-07-09 NOTE — PROGRESS NOTES
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     Interval hx 07/09/2025: states that the right eye has started to be more blurred, is almost always white in the morning and improves throughout the day. He describes his vision as cloudy. Reports drop compliance with pred forte QD OD. Denies pain, photophobia.     Chief Complaint(s) and History of Present Illness(es)       Post Op (Ophthalmology) Right Eye              Laterality: right eye    Onset: gradual    Onset: 2 weeks ago    Location: central vision    Quality: blurred vision    Severity: moderate    Frequency: constantly    Timing: in the morning    Course: gradually worsening    Associated symptoms: tearing and floaters (OD).  Negative for eye pain, redness, headache, photophobia, flashes and foreign body sensation    Comments: Follow up Penetrating keratoplasty right eye               Comments    Patient uses prednisolone every day right eye  And uses scleral lens for both eyes and his blurryness get worse in right eye when scleral lens is On.  Lab Results       Component                Value               Date                       A1C                      7.5                 02/27/2025                 A1C                      6.9                 12/19/2024                 A1C                      7.2                 06/24/2024                 A1C                      7.4                 12/01/2023                 A1C                      7.8                 07/31/2023              Leanne Enciso 9:42 AM July 9, 2025                        Past ocular history   Prior eye surgery/laser/Trauma: Pkx1 and 1 dmek  CTL wearer:No  Glasses : -  Family Hx of eye disease: -    PMH     Past Medical History:   Diagnosis Date    Diabetes mellitus, type 2 (H)     HTN (hypertension)     Hyperlipidemia LDL goal <70     Stable keratoconus  of both eyes        PSH     Past Surgical History:   Procedure Laterality Date    CATARACT IOL, RT/LT      COLONOSCOPY N/A 03/14/2024    Procedure: COLONOSCOPY, WITH POLYPECTOMY AND BIOPSY;  Surgeon: Zaire Rome MD;  Location: [facility]    KERATOPLASTY DESCEMETS MEMBRANE ENDOTHELIAL (DMEK) Right 03/29/2023    Procedure: ATTEMPTED DESCEMET'S MEMBRANE ENDOTHELIAL KERATOPLASTY (DMEK) RIGHT, VIEW OBSCURED, PROCEDURE ABORTED.;  Surgeon: Mick Paul MD;  Location: [facility]    PHACOEMULSIFICATION WITH STANDARD INTRAOCULAR LENS IMPLANT Right 06/27/2024    Procedure: RIGHT EYE PHACOEMULSIFICATION, COMPLEX CATARACT, WITH STANDARD INTRAOCULAR LENS IMPLANT INSERTION;  Surgeon: Jeremy Meza MD;  Location: [facility] OR    [place] ANESTH,CORNEAL TRANSPLANT      REPAIR IRIS Right 06/27/2024    Procedure: REPAIR, IRIS;  Surgeon: Jeremy Meza MD;  Location: [facility] OR       [facility]     Current Outpatient Medications   Medication Sig Dispense Refill    amLODIPine (NORVASC) 10 MG tablet Take 1 tablet by mouth once daily 90 tablet 1    atorvastatin (LIPITOR) 40 MG tablet Take 1 tablet (40 mg) by mouth daily. 90 tablet 3    fluticasone (FLONASE) 50 MCG/ACT nasal spray as needed      lisinopril (ZESTRIL) 40 MG tablet Take 1 tablet (40 mg) by mouth daily. 90 tablet 3    metFORMIN (GLUCOPHAGE XR) 500 MG 24 hr tablet Take 1 tablet (500 mg) by mouth 2 times daily (with meals). 180 tablet 1    metoprolol succinate ER (TOPROL XL) 100 MG 24 hr tablet Take 1 tablet (100 mg) by mouth daily. 90 tablet 3    prednisoLONE acetate (PRED FORTE) 1 % ophthalmic suspension Place 1 drop into the right eye 3 times daily 5 mL 0    Semaglutide, 2 MG/DOSE, (OZEMPIC, 2 MG/DOSE,) 8 MG/3ML pen INJECT 2 MG SUBCUTANEOUSLY ONCE A WEEK 3 mL 1    sodium chloride 0.9 % neb solution 3 ml twice daily for scleral lens use--medially necessary for scleral lens use. 300 mL 0    spironolactone (ALDACTONE) 25 MG tablet Take 1 tablet (25 mg) by mouth daily. 30 tablet 3     tadalafil (CIALIS) 2.5 MG tablet Take 4 tablets (10 mg) by mouth daily as needed (sexual intercourse). 20 tablet 3    triamcinolone (KENALOG) 0.1 % external cream Apply topically 2 times daily. For up to 2 weeks then stop for a week and reapply as needed 30 g 3    alcohol swab prep pads Use to swab area of injection/finn as directed. 100 each 3    blood glucose (NO BRAND SPECIFIED) test strip Use to test blood sugar 3-4 times daily or as directed. To accompany: Blood Glucose Monitor Brands: per insurance. 100 strip 6    blood glucose monitoring (NO BRAND SPECIFIED) meter device kit Use to test blood sugar 3-4 times daily or as directed. Preferred blood glucose meter OR supplies to accompany: Blood Glucose Monitor Brands: per insurance. 1 kit 0    erythromycin (ROMYCIN) 5 MG/GM ophthalmic ointment Place 0.5 inches into the right eye 2 times daily (Patient not taking: Reported on 7/9/2025)      moxifloxacin (VIGAMOX) 0.5 % ophthalmic solution Apply 1 drop to eye 4 times daily In operative eye (Patient not taking: Reported on 7/9/2025) 3 mL 1    ofloxacin (OCUFLOX) 0.3 % ophthalmic solution Place 1 drop into the right eye every 2 hours (while awake) (Patient not taking: Reported on 7/9/2025) 10 mL 11    prednisoLONE acetate (PRED FORTE) 1 % ophthalmic suspension Place 1-2 drops into the right eye 6 times daily 10 mL 11    thin (NO BRAND SPECIFIED) lancets Use to test blood sugar 3-4 times daily or as directed. To accompany: Blood Glucose Monitor Brands: per insurance. 100 each 6     No current facility-administered medications for this visit.       Labs   -    Imaging   -    Drops Currently Taking   07/09/25   Prednisolone one time daily right eye       Assessment/Plan 07/09/2025   #Pseudophakia right eye   #Concern for scleritis   #PCO OD  S/p CEIOL with iridoplasty and PK suture removal right eye 6/27/24   Postoperative restrictions reviewed and work note given  Return precautions reviewed  07/08/24: gradual onset  of pain last night over hours. Rates pain 10/10 with pressure and stabbing sensation. Took ibuprofen and helped the pain. Currently pain is 5/10. Exam today with 3+ injection of the right eye. No corkscrew vessels but tender to palpation. Diffuse PEE but otherwise appropriate postop week 1. No leak at suture site. No corneal edema. No abrasions. No AC inflammation. Etiology of pain is scleritis vs dry eyes. Will obtain b scan today No blurry vision.  Family history of lupus.     07/15/24: reports improved pain to 0/10 today. States pain resolved the day after he started medrol dose pack. VIPUL resolved nasally today but now exposing engorged vessels compared to OS. Will defer scleritis work up for now and consider if second episode.     08/14/24 : reports stable vision. Exam is stable from prior. Unable to get Mrx today. Irregular astigmatism per pentacam on Meredith 10, 2024   2/19/205: stable examination, stable VA each eye; happy with CL OS    2/19/25 stable without changes. Pachy is decreased OD  R/B/A discussed with the patient including risk of RD, Bleeding, High IOP, inflammation. Patient agrees to proceed    07/09/25: Worsened vision compared to prior, describes as more cloudy. IOP remains within normal limits. Exam showing diffuse injection and limbal flush, mild stromal haze. No intraocular inflammation. Likely has a combination of rejection as well as endothelial failure.     # Keratoconus each eye  # History of right eye PKPx3 with failing graft/rejection  # S/p right eye attempted DMEK 3/29/23, aborted due to obstructed view from corneal edema (see OP note)  - Right eye PKP 4/6/23, doing well, healing very well  - S/p phaco IOL 6/27/24 with Iris repair    PLAN 07/09/25:   - Increase pred forte to 6 times daily  - Follow up 2 weeks [facility]  - Follow up 1-2 weeks retina (prior to next [facility] visit) for evaluation of IS/OS disruption on OCT macula seen today; defer to retina but suggest VTD, FAF, Optos,  repeat OCT macula    Nithin Mendoza MD  PGY-3, Ophthalmology  [facility]    Attending Physician Attestation: Complete documentation of historical and exam elements from today's encounter can be found in the full encounter summary report (not reduplicated in this progress note). I personally obtained the chief complaint(s) and history of present illness. I confirmed and edited as necessary the review of systems, past medical/surgical history, family history, social history, and examination findings as documented by others; and I examined the patient myself. I personally reviewed the relevant tests, images, and reports as documented above. I formulated and edited as necessary the assessment and plan and discussed the findings and management plan with the patient and family. I personally reviewed the ophthalmic test(s) associated with this encounter, agree with the interpretation(s) as documented by the resident/fellow, and have edited the corresponding report(s) as necessary. I was present for the entire procedure(s). - Jeremy Meza M.D

## 2025-07-17 DIAGNOSIS — H43.393 VITREOUS SYNERESIS OF BOTH EYES: Primary | ICD-10-CM

## 2025-07-17 RX ORDER — PREDNISOLONE ACETATE 10 MG/ML
1-2 SUSPENSION/ DROPS OPHTHALMIC
Qty: 15 ML | Refills: 5 | Status: SHIPPED | OUTPATIENT
Start: 2025-07-17

## 2025-07-17 NOTE — TELEPHONE ENCOUNTER
Medication:     Requested directions: and  Current directions on the medication list:   prednisoLONE acetate (PRED FORTE) 1 % ophthalmic suspension 10 mL 11 6/28/2024 -- No   Sig - Route: Place 1-2 drops into the right eye 6 times daily - Right Eye     Last Office Visit: 7-9-25  Future Office visit: 7-23-25    Attending Provider: Susana  Last Clinic Note: PLAN 07/09/25:   - Increase pred forte to 6 times daily    Routing refill request to provider for review/approval because:    Not on protocol  Requires provider review

## 2025-07-17 NOTE — TELEPHONE ENCOUNTER
Rx for Prednisolone eye drops sent to pharmacy per pharmacy request.    Alexei Serrano RN 7:19 AM 07/17/25

## 2025-07-23 ENCOUNTER — OFFICE VISIT (OUTPATIENT)
Dept: OPHTHALMOLOGY | Facility: CLINIC | Age: 55
End: 2025-07-23
Attending: OPHTHALMOLOGY
Payer: COMMERCIAL

## 2025-07-23 DIAGNOSIS — T86.8409 REJECTION OF CORNEAL GRAFT: Primary | ICD-10-CM

## 2025-07-23 PROCEDURE — 76514 ECHO EXAM OF EYE THICKNESS: CPT | Performed by: OPHTHALMOLOGY

## 2025-07-23 PROCEDURE — 99213 OFFICE O/P EST LOW 20 MIN: CPT | Performed by: OPHTHALMOLOGY

## 2025-07-23 PROCEDURE — 99212 OFFICE O/P EST SF 10 MIN: CPT | Performed by: OPHTHALMOLOGY

## 2025-07-23 ASSESSMENT — VISUAL ACUITY
OS_CC: 20/50
OS_PH_CC: 20/30
OD_SC: 20/200
CORRECTION_TYPE: CONTACTS
OD_PH_SC+: -1
OS_PH_CC+: -2
OD_PH_SC: 20/125
METHOD: SNELLEN - LINEAR

## 2025-07-23 ASSESSMENT — TONOMETRY
OS_IOP_MMHG: 15
IOP_METHOD: TONOPEN
OD_IOP_MMHG: UNABLE

## 2025-07-23 ASSESSMENT — EXTERNAL EXAM - RIGHT EYE: OD_EXAM: NORMAL

## 2025-07-23 ASSESSMENT — PACHYMETRY
OD_CT(UM): 740
EXAM_DATE: 7/23/2025

## 2025-07-23 ASSESSMENT — EXTERNAL EXAM - LEFT EYE: OS_EXAM: NORMAL

## 2025-07-23 ASSESSMENT — SLIT LAMP EXAM - LIDS
COMMENTS: NORMAL
COMMENTS: NORMAL

## 2025-07-23 NOTE — PROGRESS NOTES
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     Interval hx 07/23/2025: states that the right eye has started to be more blurred, is almost always white in the morning and improves throughout the day. He describes his vision as cloudy. Reports drop compliance with pred forte QD OD. Denies pain, photophobia.     Chief Complaint(s) and History of Present Illness(es)       Follow Up              Quality: blurred vision    Course: stable    Associated symptoms: floaters.  Negative for eye pain, flashes and swelling              Comments    Pt denies any changes since last exam.     Ocular Meds:   Pred 6x daily right eye    Maddy Asencio OA 9:43 AM July 23, 2025                      Past ocular history   Prior eye surgery/laser/Trauma: Pkx1 and 1 dmek  CTL wearer:No  Glasses : -  Family Hx of eye disease: -    PMH     Past Medical History:   Diagnosis Date    Diabetes mellitus, type 2 (H)     HTN (hypertension)     Hyperlipidemia LDL goal <70     Stable keratoconus of both eyes        PSH     Past Surgical History:   Procedure Laterality Date    CATARACT IOL, RT/LT      COLONOSCOPY N/A 03/14/2024    Procedure: COLONOSCOPY, WITH POLYPECTOMY AND BIOPSY;  Surgeon: Zaire Rome MD;  Location: [facility]    KERATOPLASTY DESCEMETS MEMBRANE ENDOTHELIAL (DMEK) Right 03/29/2023    Procedure: ATTEMPTED DESCEMET'S MEMBRANE ENDOTHELIAL KERATOPLASTY (DMEK) RIGHT, VIEW OBSCURED, PROCEDURE ABORTED.;  Surgeon: Mick Paul MD;  Location: [facility]    PHACOEMULSIFICATION WITH STANDARD INTRAOCULAR LENS IMPLANT Right 06/27/2024    Procedure: RIGHT EYE PHACOEMULSIFICATION, COMPLEX CATARACT, WITH STANDARD INTRAOCULAR LENS IMPLANT INSERTION;  Surgeon: Jeremy Meza MD;  [place] [facility] [place]    IA ANESTH,CORNEAL TRANSPLANT      REPAIR IRIS Right 06/27/2024    Procedure: REPAIR, IRIS;  Surgeon:  Jeremy Meza MD;  Location: [facility] [place]       [facility]     Current Outpatient Medications   Medication Sig Dispense Refill    alcohol swab prep pads Use to swab area of injection/finn as directed. 100 each 3    amLODIPine (NORVASC) 10 MG tablet Take 1 tablet by mouth once daily 90 tablet 1    atorvastatin (LIPITOR) 40 MG tablet Take 1 tablet (40 mg) by mouth daily. 90 tablet 3    blood glucose (NO BRAND SPECIFIED) test strip Use to test blood sugar 3-4 times daily or as directed. To accompany: Blood Glucose Monitor Brands: per insurance. 100 strip 6    blood glucose monitoring (NO BRAND SPECIFIED) meter device kit Use to test blood sugar 3-4 times daily or as directed. Preferred blood glucose meter OR supplies to accompany: Blood Glucose Monitor Brands: per insurance. 1 kit 0    erythromycin (ROMYCIN) 5 MG/GM ophthalmic ointment Place 0.5 inches into the right eye 2 times daily      fluticasone (FLONASE) 50 MCG/ACT nasal spray as needed      lisinopril (ZESTRIL) 40 MG tablet Take 1 tablet (40 mg) by mouth daily. 90 tablet 3    metFORMIN (GLUCOPHAGE XR) 500 MG 24 hr tablet Take 1 tablet (500 mg) by mouth 2 times daily (with meals). 180 tablet 1    metoprolol succinate ER (TOPROL XL) 100 MG 24 hr tablet Take 1 tablet (100 mg) by mouth daily. 90 tablet 3    moxifloxacin (VIGAMOX) 0.5 % ophthalmic solution Apply 1 drop to eye 4 times daily In operative eye 3 mL 1    ofloxacin (OCUFLOX) 0.3 % ophthalmic solution Place 1 drop into the right eye every 2 hours (while awake) 10 mL 11    prednisoLONE acetate (PRED FORTE) 1 % ophthalmic suspension Place 1-2 drops into the right eye 6 times daily. 15 mL 5    Semaglutide, 2 MG/DOSE, (OZEMPIC, 2 MG/DOSE,) 8 MG/3ML pen INJECT 2 MG SUBCUTANEOUSLY ONCE A WEEK 3 mL 1    sodium chloride 0.9 % neb solution 3 ml twice daily for scleral lens use--medially necessary for scleral lens use. 300 mL 0    spironolactone (ALDACTONE) 25 MG tablet Take 1 tablet (25 mg) by mouth daily. 30 tablet 3     tadalafil (CIALIS) 2.5 MG tablet Take 4 tablets (10 mg) by mouth daily as needed (sexual intercourse). 20 tablet 3    thin (NO BRAND SPECIFIED) lancets Use to test blood sugar 3-4 times daily or as directed. To accompany: Blood Glucose Monitor Brands: per insurance. 100 each 6    triamcinolone (KENALOG) 0.1 % external cream Apply topically 2 times daily. For up to 2 weeks then stop for a week and reapply as needed 30 g 3    prednisoLONE acetate (PRED FORTE) 1 % ophthalmic suspension Place 1 drop into the right eye 3 times daily (Patient not taking: Reported on 7/23/2025) 5 mL 0     No current facility-administered medications for this visit.       Labs   -    Imaging   -    Drops Currently Taking   07/09/25   Prednisolone one time daily right eye       Assessment/Plan 07/23/2025   #Pseudophakia right eye   #rejectiion of corneal transplant right eye?????  S/p CEIOL with iridoplasty and PK suture removal right eye 6/27/24   Postoperative restrictions reviewed and work note given  Return precautions reviewed  07/08/24: gradual onset of pain last night over hours. Rates pain 10/10 with pressure and stabbing sensation. Took ibuprofen and helped the pain. Currently pain is 5/10. Exam today with 3+ injection of the right eye. No corkscrew vessels but tender to palpation. Diffuse PEE but otherwise appropriate postop week 1. No leak at suture site. No corneal edema. No abrasions. No AC inflammation. Etiology of pain is scleritis vs dry eyes. Will obtain b scan today No blurry vision.  Family history of lupus.     07/15/24: reports improved pain to 0/10 today. States pain resolved the day after he started medrol dose pack. VIPUL resolved nasally today but now exposing engorged vessels compared to OS. Will defer scleritis work up for now and consider if second episode.     08/14/24 : reports stable vision. Exam is stable from prior. Unable to get Mrx today. Irregular astigmatism per pentacam on Meredith 10, 2024   2/19/205: stable  examination, stable VA each eye; happy with CL OS    2/19/25 stable without changes. Pachy is decreased OD  R/B/A discussed with the patient including risk of RD, Bleeding, High IOP, inflammation. Patient agrees to proceed    07/09/25: Worsened vision compared to prior, describes as more cloudy. IOP remains within normal limits. Exam showing diffuse injection and limbal flush, mild stromal haze. No intraocular inflammation. Likely has a combination of rejection as well as endothelial failure.     # Keratoconus each eye  # History of right eye PKPx3 with failing graft/rejection  # S/p right eye attempted DMEK 3/29/23, aborted due to obstructed view from corneal edema (see OP note)  - Right eye PKP 4/6/23, doing well, healing very well  - S/p phaco IOL 6/27/24 with Iris repair    PLAN :   Increased pachy compared to 2024  Recent changes are not completely explained by the cornea  - pred forte to 5 times daily for 2 weeks then 4 times daily for 2 weeks    - Follow up 1 month with cornea   retina for evaluation of IS/OS disruption on OCT macula seen today; defer to retina but suggest VTD, FAF, Optos, repeat OCT macula  Attending Physician Attestation:  Complete documentation of historical and exam elements from today's encounter can be found in the full encounter summary report (not reduplicated in this progress note).  I personally obtained the chief complaint(s) and history of present illness.  I confirmed and edited as necessary the review of systems, past medical/surgical history, family history, social history, and examination findings as documented by others; and I examined the patient myself.  I personally reviewed the relevant tests, images, and reports as documented above.  I formulated and edited as necessary the assessment and plan and discussed the findings and management plan with the patient and family. - Jeremy Meza MD

## 2025-07-24 ENCOUNTER — OFFICE VISIT (OUTPATIENT)
Dept: OPHTHALMOLOGY | Facility: CLINIC | Age: 55
End: 2025-07-24
Payer: COMMERCIAL

## 2025-07-24 DIAGNOSIS — H35.351 CYSTOID MACULAR EDEMA, RIGHT EYE: ICD-10-CM

## 2025-07-24 DIAGNOSIS — H43.393 VITREOUS SYNERESIS OF BOTH EYES: ICD-10-CM

## 2025-07-24 DIAGNOSIS — E11.311 DIABETIC MACULAR EDEMA, RIGHT EYE (H): Primary | ICD-10-CM

## 2025-07-24 PROCEDURE — 92134 CPTRZ OPH DX IMG PST SGM RTA: CPT

## 2025-07-24 PROCEDURE — 67028 INJECTION EYE DRUG: CPT | Mod: RT

## 2025-07-24 PROCEDURE — 92250 FUNDUS PHOTOGRAPHY W/I&R: CPT

## 2025-07-24 PROCEDURE — 99213 OFFICE O/P EST LOW 20 MIN: CPT

## 2025-07-24 PROCEDURE — 250N000011 HC RX IP 250 OP 636

## 2025-07-24 RX ADMIN — Medication 1.25 MG: at 12:09

## 2025-07-24 ASSESSMENT — TONOMETRY
OS_IOP_MMHG: 06
OD_IOP_MMHG: 10
IOP_METHOD: TONOPEN

## 2025-07-24 ASSESSMENT — VISUAL ACUITY
CORRECTION_TYPE: CONTACTS
OD_SC: 20/200
OS_CC+: +2
OD_PH_SC: 20/150
OS_CC: 20/50
OD_PH_SC+: -1
METHOD: SNELLEN - LINEAR

## 2025-07-24 ASSESSMENT — SLIT LAMP EXAM - LIDS
COMMENTS: NORMAL
COMMENTS: NORMAL

## 2025-07-24 ASSESSMENT — EXTERNAL EXAM - RIGHT EYE: OD_EXAM: NORMAL

## 2025-07-24 ASSESSMENT — EXTERNAL EXAM - LEFT EYE: OS_EXAM: NORMAL

## 2025-07-24 NOTE — PROGRESS NOTES
CC: r/o maculopathy initial exam     HPI: blurred vision OD>OS, gradually worsening over time    PMHx:   DM type II   HTN on rx     Imaging:  OCT: 07/24/2025  Right eye: IRF/SRF   Left eye: unclear view    Retina Laser procedures:  none    Intravitreal injections:  none    Assessment/ Plan: 07/24/2025       # DM type II   For 10 year   # Cystoid macular edema right eye   Last A1c is:  Lab Results   Component Value Date    A1C 7.5 02/27/2025    A1C 6.9 12/19/2024    A1C 7.2 06/24/2024    A1C 7.4 12/01/2023    A1C 7.8 07/31/2023   OCT shows diabetic macular edema right eye    Recommend adequate blood sugar control, also recommend adequate BP control , patient states that it is 140-150 SBP and that his pcp is trying to improve it   R/o diabetic macular edema vs inflammatory cme   R/b/a to anti-VEGF discussed with the patient, will proceed with Avastin OD today , follow-up in 4 weeks with Oct macula both eyes  If no improvement in CME might consider PSTK    # Pseudophakia right eye   # Rejection of corneal transplant right eye  S/p CEIOL with iridoplasty and PK suture removal right eye 6/27/24   Currently on predforte 1 drop 5 times daily     # Keratoconus each eye  # History of right eye PKPx3 with failing graft/rejection  # S/p right eye attempted DMEK 3/29/23, aborted due to obstructed view from corneal edema (see OP note)  - Right eye PKP 4/6/23, doing well, healing very well  - S/p phaco IOL 6/27/24 with Iris repair    , follow-up in 4 weeks with Oct macula both eyes  If no improvement in CME might consider PSTK- please try to get OCT cuts OS with the rigid contact lens? Might improve visibility to back     Diamante Clancy MD     Medical Retina  [facility]     Attending Physician Attestation:  Complete documentation of historical and exam elements from today's encounter can be found in the full encounter summary report (not reduplicated in this progress note).  I personally obtained  the chief complaint(s) and history of present illness.  I confirmed and edited as necessary the review of systems, past medical/surgical history, family history, social history, and examination findings as documented by others; and I examined the patient myself.  I personally reviewed the relevant tests, images, and reports as documented above.  I formulated and edited as necessary the assessment and plan and discussed the findings and management plan with the patient and family. Diamante Clancy MD

## 2025-08-05 ENCOUNTER — OFFICE VISIT (OUTPATIENT)
Dept: CARDIOLOGY | Facility: CLINIC | Age: 55
End: 2025-08-05
Attending: INTERNAL MEDICINE
Payer: COMMERCIAL

## 2025-08-05 VITALS
BODY MASS INDEX: 44.1 KG/M2 | SYSTOLIC BLOOD PRESSURE: 144 MMHG | RESPIRATION RATE: 16 BRPM | DIASTOLIC BLOOD PRESSURE: 85 MMHG | OXYGEN SATURATION: 97 % | WEIGHT: 315 LBS | HEART RATE: 74 BPM | HEIGHT: 71 IN

## 2025-08-05 DIAGNOSIS — E11.65 TYPE 2 DIABETES MELLITUS WITH HYPERGLYCEMIA, WITHOUT LONG-TERM CURRENT USE OF INSULIN (H): ICD-10-CM

## 2025-08-05 DIAGNOSIS — I1A.0 RESISTANT HYPERTENSION: Primary | ICD-10-CM

## 2025-08-05 RX ORDER — NEBIVOLOL 20 MG/1
20 TABLET ORAL DAILY
Qty: 30 TABLET | Refills: 3 | Status: SHIPPED | OUTPATIENT
Start: 2025-08-05

## 2025-08-05 RX ORDER — SPIRONOLACTONE 25 MG/1
50 TABLET ORAL DAILY
Qty: 30 TABLET | Refills: 3 | Status: SHIPPED | OUTPATIENT
Start: 2025-08-05

## 2025-08-19 DIAGNOSIS — H35.351 CYSTOID MACULAR EDEMA, RIGHT EYE: Primary | ICD-10-CM

## 2025-08-20 ENCOUNTER — OFFICE VISIT (OUTPATIENT)
Dept: OPHTHALMOLOGY | Facility: CLINIC | Age: 55
End: 2025-08-20
Attending: OPHTHALMOLOGY
Payer: COMMERCIAL

## 2025-08-20 ENCOUNTER — OFFICE VISIT (OUTPATIENT)
Dept: OPHTHALMOLOGY | Facility: CLINIC | Age: 55
End: 2025-08-20
Payer: COMMERCIAL

## 2025-08-20 DIAGNOSIS — H35.351 CYSTOID MACULAR EDEMA, RIGHT EYE: Primary | ICD-10-CM

## 2025-08-20 DIAGNOSIS — T86.8409 REJECTION OF CORNEAL GRAFT: Primary | ICD-10-CM

## 2025-08-20 DIAGNOSIS — H35.351 CYSTOID MACULAR EDEMA, RIGHT EYE: ICD-10-CM

## 2025-08-20 PROCEDURE — 99211 OFF/OP EST MAY X REQ PHY/QHP: CPT

## 2025-08-20 PROCEDURE — 99214 OFFICE O/P EST MOD 30 MIN: CPT

## 2025-08-20 PROCEDURE — 999N000103 HC STATISTIC NO CHARGE FACILITY FEE

## 2025-08-20 PROCEDURE — 250N000011 HC RX IP 250 OP 636

## 2025-08-20 PROCEDURE — 99214 OFFICE O/P EST MOD 30 MIN: CPT | Performed by: OPHTHALMOLOGY

## 2025-08-20 RX ADMIN — Medication 1.25 MG: at 09:39

## 2025-08-20 ASSESSMENT — VISUAL ACUITY
OD_PH_SC: 20/100
METHOD: SNELLEN - LINEAR
OS_PH_CC+: -1
CORRECTION_TYPE: CONTACTS
OD_PH_SC+: +1
METHOD: SNELLEN - LINEAR
OS_CC: 20/70
OD_PH_SC: 20/100
OD_PH_SC+: +1
OS_PH_CC+: -1
OS_PH_CC: 20/30
OD_SC: 20/150
OS_PH_CC: 20/30
CORRECTION_TYPE: CONTACTS
OS_CC: 20/70
OD_SC: 20/150

## 2025-08-20 ASSESSMENT — EXTERNAL EXAM - LEFT EYE
OS_EXAM: NORMAL
OS_EXAM: NORMAL

## 2025-08-20 ASSESSMENT — PACHYMETRY
OS_CT(UM): 341
OD_CT(UM): 757

## 2025-08-20 ASSESSMENT — SLIT LAMP EXAM - LIDS
COMMENTS: NORMAL

## 2025-08-20 ASSESSMENT — TONOMETRY
IOP_METHOD: ICARE
OD_IOP_MMHG: 9
OS_IOP_MMHG: 6
OD_IOP_MMHG: 9
OS_IOP_MMHG: 6
IOP_METHOD: ICARE

## 2025-08-20 ASSESSMENT — EXTERNAL EXAM - RIGHT EYE
OD_EXAM: NORMAL
OD_EXAM: NORMAL

## 2025-08-20 ASSESSMENT — REFRACTION_WEARINGRX
OS_SPHERE: -17.50
OS_CYLINDER: +2.75
OS_SPHERE: -17.50
OS_AXIS: 035
SPECS_TYPE: SCLERAL LENS
SPECS_TYPE: SCLERAL LENS
OS_AXIS: 035
OS_CYLINDER: +2.75

## 2025-08-22 PROBLEM — T86.8409 REJECTION OF CORNEAL GRAFT: Status: ACTIVE | Noted: 2025-08-20

## 2025-08-22 PROBLEM — H35.351 CYSTOID MACULAR EDEMA, RIGHT EYE: Status: ACTIVE | Noted: 2025-08-20

## 2025-08-27 ENCOUNTER — LAB (OUTPATIENT)
Dept: LAB | Facility: CLINIC | Age: 55
End: 2025-08-27
Payer: COMMERCIAL

## 2025-08-27 DIAGNOSIS — I1A.0 RESISTANT HYPERTENSION: ICD-10-CM

## 2025-08-27 LAB
ANION GAP SERPL CALCULATED.3IONS-SCNC: 9 MMOL/L (ref 7–15)
BUN SERPL-MCNC: 13.6 MG/DL (ref 6–20)
CALCIUM SERPL-MCNC: 9.2 MG/DL (ref 8.8–10.4)
CHLORIDE SERPL-SCNC: 104 MMOL/L (ref 98–107)
CREAT SERPL-MCNC: 1.09 MG/DL (ref 0.67–1.17)
EGFRCR SERPLBLD CKD-EPI 2021: 81 ML/MIN/1.73M2
GLUCOSE SERPL-MCNC: 268 MG/DL (ref 70–99)
HCO3 SERPL-SCNC: 25 MMOL/L (ref 22–29)
POTASSIUM SERPL-SCNC: 3.7 MMOL/L (ref 3.4–5.3)
SODIUM SERPL-SCNC: 138 MMOL/L (ref 135–145)

## 2025-08-29 ENCOUNTER — OFFICE VISIT (OUTPATIENT)
Dept: FAMILY MEDICINE | Facility: CLINIC | Age: 55
End: 2025-08-29
Attending: NURSE PRACTITIONER
Payer: COMMERCIAL

## 2025-08-29 VITALS
TEMPERATURE: 97.5 F | HEIGHT: 71 IN | BODY MASS INDEX: 44.1 KG/M2 | RESPIRATION RATE: 20 BRPM | OXYGEN SATURATION: 98 % | DIASTOLIC BLOOD PRESSURE: 78 MMHG | WEIGHT: 315 LBS | HEART RATE: 62 BPM | SYSTOLIC BLOOD PRESSURE: 134 MMHG

## 2025-08-29 DIAGNOSIS — Z00.00 ROUTINE GENERAL MEDICAL EXAMINATION AT A HEALTH CARE FACILITY: Primary | ICD-10-CM

## 2025-08-29 DIAGNOSIS — N52.1 ERECTILE DYSFUNCTION DUE TO DISEASES CLASSIFIED ELSEWHERE: ICD-10-CM

## 2025-08-29 DIAGNOSIS — E66.813 CLASS 3 SEVERE OBESITY DUE TO EXCESS CALORIES WITH SERIOUS COMORBIDITY AND BODY MASS INDEX (BMI) OF 45.0 TO 49.9 IN ADULT (H): ICD-10-CM

## 2025-08-29 DIAGNOSIS — E11.65 TYPE 2 DIABETES MELLITUS WITH HYPERGLYCEMIA, WITHOUT LONG-TERM CURRENT USE OF INSULIN (H): ICD-10-CM

## 2025-08-29 DIAGNOSIS — E78.5 HYPERLIPIDEMIA LDL GOAL <70: ICD-10-CM

## 2025-08-29 DIAGNOSIS — I1A.0 RESISTANT HYPERTENSION: ICD-10-CM

## 2025-08-29 PROBLEM — M51.379 DEGENERATION OF LUMBOSACRAL INTERVERTEBRAL DISC: Status: ACTIVE | Noted: 2025-08-29

## 2025-08-29 LAB
CHOLEST SERPL-MCNC: 103 MG/DL
CREAT UR-MCNC: 128.6 MG/DL
EST. AVERAGE GLUCOSE BLD GHB EST-MCNC: 183 MG/DL
FASTING STATUS PATIENT QL REPORTED: NO
HBA1C MFR BLD: 8 % (ref 0–5.6)
HDLC SERPL-MCNC: 35 MG/DL
LDLC SERPL CALC-MCNC: 51 MG/DL
MICROALBUMIN UR-MCNC: <12 MG/L
MICROALBUMIN/CREAT UR: NORMAL MG/G{CREAT}
NONHDLC SERPL-MCNC: 68 MG/DL
TRIGL SERPL-MCNC: 87 MG/DL

## 2025-08-29 PROCEDURE — 80061 LIPID PANEL: CPT | Performed by: NURSE PRACTITIONER

## 2025-08-29 PROCEDURE — 3075F SYST BP GE 130 - 139MM HG: CPT | Performed by: NURSE PRACTITIONER

## 2025-08-29 PROCEDURE — 1126F AMNT PAIN NOTED NONE PRSNT: CPT | Performed by: NURSE PRACTITIONER

## 2025-08-29 PROCEDURE — 82043 UR ALBUMIN QUANTITATIVE: CPT | Performed by: NURSE PRACTITIONER

## 2025-08-29 PROCEDURE — 3052F HG A1C>EQUAL 8.0%<EQUAL 9.0%: CPT | Performed by: NURSE PRACTITIONER

## 2025-08-29 PROCEDURE — 82570 ASSAY OF URINE CREATININE: CPT | Performed by: NURSE PRACTITIONER

## 2025-08-29 PROCEDURE — 3078F DIAST BP <80 MM HG: CPT | Performed by: NURSE PRACTITIONER

## 2025-08-29 PROCEDURE — G2211 COMPLEX E/M VISIT ADD ON: HCPCS | Performed by: NURSE PRACTITIONER

## 2025-08-29 PROCEDURE — 83036 HEMOGLOBIN GLYCOSYLATED A1C: CPT | Performed by: NURSE PRACTITIONER

## 2025-08-29 PROCEDURE — 99214 OFFICE O/P EST MOD 30 MIN: CPT | Mod: 25 | Performed by: NURSE PRACTITIONER

## 2025-08-29 PROCEDURE — 99396 PREV VISIT EST AGE 40-64: CPT | Performed by: NURSE PRACTITIONER

## 2025-08-29 PROCEDURE — 3048F LDL-C <100 MG/DL: CPT | Performed by: NURSE PRACTITIONER

## 2025-08-29 PROCEDURE — 36415 COLL VENOUS BLD VENIPUNCTURE: CPT | Performed by: NURSE PRACTITIONER

## 2025-08-29 RX ORDER — AMLODIPINE BESYLATE 10 MG/1
10 TABLET ORAL DAILY
Qty: 90 TABLET | Refills: 3 | Status: SHIPPED | OUTPATIENT
Start: 2025-08-29

## 2025-08-29 RX ORDER — ATORVASTATIN CALCIUM 40 MG/1
40 TABLET, FILM COATED ORAL DAILY
Qty: 90 TABLET | Refills: 3 | Status: SHIPPED | OUTPATIENT
Start: 2025-08-29

## 2025-08-29 RX ORDER — LISINOPRIL 40 MG/1
40 TABLET ORAL DAILY
Qty: 90 TABLET | Refills: 3 | Status: SHIPPED | OUTPATIENT
Start: 2025-08-29

## 2025-08-29 RX ORDER — TADALAFIL 2.5 MG/1
10 TABLET ORAL DAILY PRN
Qty: 20 TABLET | Refills: 3 | Status: SHIPPED | OUTPATIENT
Start: 2025-08-29

## 2025-08-29 RX ORDER — METFORMIN HYDROCHLORIDE 500 MG/1
500 TABLET, EXTENDED RELEASE ORAL 2 TIMES DAILY WITH MEALS
Qty: 180 TABLET | Refills: 1 | Status: SHIPPED | OUTPATIENT
Start: 2025-08-29

## 2025-08-29 SDOH — HEALTH STABILITY: PHYSICAL HEALTH: ON AVERAGE, HOW MANY DAYS PER WEEK DO YOU ENGAGE IN MODERATE TO STRENUOUS EXERCISE (LIKE A BRISK WALK)?: 0 DAYS

## 2025-08-29 SDOH — HEALTH STABILITY: PHYSICAL HEALTH: ON AVERAGE, HOW MANY MINUTES DO YOU ENGAGE IN EXERCISE AT THIS LEVEL?: 0 MIN

## 2025-08-29 ASSESSMENT — PAIN SCALES - GENERAL: PAINLEVEL_OUTOF10: NO PAIN (0)

## (undated) DEVICE — EYE CANN IRR 25GA HYDRODISSECTING 585037

## (undated) DEVICE — EYE KNIFE STILETTO VISITEC 1.1MM ANG 45DEG SIDEPORT 376620

## (undated) DEVICE — EYE SHIELD PLASTIC

## (undated) DEVICE — SU ETHILON 10-0 CS160-6 12" 9000G

## (undated) DEVICE — SOL NACL 0.9% 10ML VIAL 0409-4888-02

## (undated) DEVICE — EYE KNIFE CRESCENT 55DEG 373807

## (undated) DEVICE — SU PROLENE 10-0 2XSSL 15 3" JA2559

## (undated) DEVICE — LINEN TOWEL PACK X5 5464

## (undated) DEVICE — EYE TIP IRRIGATION & ASPIRATION POLYMER 35D BENT 8065751511

## (undated) DEVICE — BLADE KNIFE BEAVER MINI BEAVER6400

## (undated) DEVICE — EYE FLUORESCEIN OPHTHALMIC STRIP FLO-GLO 1272111

## (undated) DEVICE — EYE SHIELD PLASTIC CLEAR UNIVERSAL K9-6050

## (undated) DEVICE — ESU CORD BIPOLAR GREEN 10-4000

## (undated) DEVICE — GLOVE BIOGEL PI ULTRATOUCH G SZ 7.5 42175

## (undated) DEVICE — TAPE TRANSPORE 1"X1.5YD 1534S-1

## (undated) DEVICE — EYE PACK CUSTOM ANTERIOR 30DEG TIP CENTURION PPK6682-04

## (undated) DEVICE — EYE KNIFE SLIT XSTAR VISITEC 2.8MM 45DEG 373728

## (undated) DEVICE — NDL 27GA 1.25" 305136

## (undated) DEVICE — Device

## (undated) DEVICE — PACK CATARACT UMMC

## (undated) DEVICE — DRSG GAUZE 4X4" 8044

## (undated) DEVICE — SPONGE SPEAR WECK CEL 6/PKG 0008680

## (undated) DEVICE — PEN MARKING SKIN TYCO DEVON DUAL TIP 31145868

## (undated) DEVICE — DRAPE SLEEVE 599

## (undated) DEVICE — DRSG EYE PAD STERILE 1.63X2.63" NON21600

## (undated) DEVICE — EYE KNIFE SLIT XSTAR VISITEC 3.0MM 45DEG 373730

## (undated) DEVICE — EYE CANN IRR 20GA ACM LEWICKY 585061

## (undated) DEVICE — LINEN DRAPE 54X72" 5467

## (undated) DEVICE — EYE CANN IRR 30GA  ANTERIOR CHAMBER 581273

## (undated) DEVICE — STRAP KNEE/BODY 31143004

## (undated) DEVICE — EYE TIP BIPOLAR 18GA ERASER 221250

## (undated) DEVICE — EYE TIP IRRIGATION & ASPIRATION POLYMER CVD 0.3MM 8065751512

## (undated) DEVICE — EYE KIT AUTO GAS FOR CONSTELLATION 8065751014

## (undated) DEVICE — DRAPE EYE SHEET 8441

## (undated) DEVICE — TAPE DURAPORE 3" SILK 1538-3

## (undated) DEVICE — GLOVE BIOGEL PI ULTRATOUCH G SZ 8.5 42185

## (undated) DEVICE — CONNECTOR STOPCOCK 3 WAY MALE LL HI-FLO MX9311L

## (undated) DEVICE — EYE GAS ISPAN C3F8 8065797105

## (undated) DEVICE — TAPE MICROPORE 2"X1.5YD 1530S-2

## (undated) DEVICE — PACK CATARACT CUSTOM ASC SEY15CPUMC

## (undated) DEVICE — EYE PREP BETADINE 5% SOLUTION 30ML 0065-0411-30

## (undated) DEVICE — BLADE KNIFE BEAVER MICROSHARP GREEN 377515

## (undated) DEVICE — SYR 05ML SLIP TIP W/O NDL

## (undated) DEVICE — EYE NDL RETROBULBAR ATKINSON 25GA 1.5" 581637

## (undated) DEVICE — EYE CORNEA PROCESS FEE FOR MN LIONS BANK
Type: IMPLANTABLE DEVICE | Site: EYE | Status: NON-FUNCTIONAL
Removed: 2023-03-29

## (undated) DEVICE — EYE LENS GLIDE 581033

## (undated) DEVICE — EYE LENS BNDG CONTACT ACUVUE OASYS W/HYDRACLEAR PLUS 14.0D P

## (undated) DEVICE — DRSG TEGADERM 2 3/8X2 3/4" 1624W

## (undated) DEVICE — SOL WATER IRRIG 500ML BOTTLE 2F7113

## (undated) DEVICE — POSITIONER ARMBOARD FOAM 1PAIR LF FP-ARMB1

## (undated) DEVICE — ENDO FORCEP ENDOJAW BIOPSY 3.7MMX230CM FB-222U

## (undated) DEVICE — NDL 25GA 1.5" 305127

## (undated) DEVICE — DRSG TEGADERM 4X4 3/4" 1626W

## (undated) DEVICE — APPLICATOR COTTON TIP 6"X2 STERILE LF 6012

## (undated) DEVICE — SU VICRYL 8-0 TG160-8 18" J547G

## (undated) DEVICE — CATARACT BLADE PACK 2.4MM 58001897

## (undated) RX ORDER — PROPOFOL 10 MG/ML
INJECTION, EMULSION INTRAVENOUS
Status: DISPENSED
Start: 2023-03-29

## (undated) RX ORDER — FENTANYL CITRATE-0.9 % NACL/PF 10 MCG/ML
PLASTIC BAG, INJECTION (ML) INTRAVENOUS
Status: DISPENSED
Start: 2023-03-29

## (undated) RX ORDER — LIDOCAINE HYDROCHLORIDE 10 MG/ML
INJECTION, SOLUTION INFILTRATION; PERINEURAL
Status: DISPENSED
Start: 2024-03-14

## (undated) RX ORDER — FENTANYL CITRATE 50 UG/ML
INJECTION, SOLUTION INTRAMUSCULAR; INTRAVENOUS
Status: DISPENSED
Start: 2023-03-29

## (undated) RX ORDER — ONDANSETRON 2 MG/ML
INJECTION INTRAMUSCULAR; INTRAVENOUS
Status: DISPENSED
Start: 2024-06-27

## (undated) RX ORDER — HYDROMORPHONE HYDROCHLORIDE 1 MG/ML
INJECTION, SOLUTION INTRAMUSCULAR; INTRAVENOUS; SUBCUTANEOUS
Status: DISPENSED
Start: 2024-06-27

## (undated) RX ORDER — GLYCOPYRROLATE 0.2 MG/ML
INJECTION INTRAMUSCULAR; INTRAVENOUS
Status: DISPENSED
Start: 2024-06-27

## (undated) RX ORDER — ONDANSETRON 2 MG/ML
INJECTION INTRAMUSCULAR; INTRAVENOUS
Status: DISPENSED
Start: 2023-03-29

## (undated) RX ORDER — PROPOFOL 10 MG/ML
INJECTION, EMULSION INTRAVENOUS
Status: DISPENSED
Start: 2024-03-14